# Patient Record
Sex: FEMALE | Race: WHITE | Employment: OTHER | URBAN - METROPOLITAN AREA
[De-identification: names, ages, dates, MRNs, and addresses within clinical notes are randomized per-mention and may not be internally consistent; named-entity substitution may affect disease eponyms.]

---

## 2017-01-09 RX ORDER — ZOLEDRONIC ACID 5 MG/100ML
5 INJECTION, SOLUTION INTRAVENOUS ONCE
Status: ACTIVE | OUTPATIENT
Start: 2017-01-11 | End: 2017-01-12

## 2017-01-13 ENCOUNTER — HOSPITAL ENCOUNTER (OUTPATIENT)
Dept: INFUSION THERAPY | Age: 62
Discharge: HOME | End: 2017-01-13
Payer: COMMERCIAL

## 2017-01-13 VITALS
DIASTOLIC BLOOD PRESSURE: 72 MMHG | OXYGEN SATURATION: 99 % | TEMPERATURE: 96.5 F | HEART RATE: 71 BPM | RESPIRATION RATE: 18 BRPM | SYSTOLIC BLOOD PRESSURE: 109 MMHG

## 2017-01-13 LAB
ANION GAP BLD CALC-SCNC: 19 MMOL/L (ref 5–15)
BUN BLD-MCNC: 29 MG/DL (ref 9–20)
CA-I BLD-MCNC: 1.28 MMOL/L (ref 1.12–1.32)
CHLORIDE BLD-SCNC: 99 MMOL/L (ref 98–107)
CO2 BLD-SCNC: 25 MMOL/L (ref 21–32)
CREAT BLD-MCNC: 1.2 MG/DL (ref 0.6–1.3)
CREAT SERPL-MCNC: 1.14 MG/DL (ref 0.55–1.02)
GLUCOSE BLD-MCNC: 82 MG/DL (ref 65–105)
HCT VFR BLD CALC: 36 % (ref 35–47)
HGB BLD-MCNC: 12.2 GM/DL (ref 11.5–16)
POTASSIUM BLD-SCNC: 4.7 MMOL/L (ref 3.5–5.1)
SERVICE CMNT-IMP: ABNORMAL
SODIUM BLD-SCNC: 136 MMOL/L (ref 136–145)

## 2017-01-13 PROCEDURE — 36415 COLL VENOUS BLD VENIPUNCTURE: CPT | Performed by: FAMILY MEDICINE

## 2017-01-13 PROCEDURE — 80047 BASIC METABLC PNL IONIZED CA: CPT

## 2017-01-13 PROCEDURE — 82565 ASSAY OF CREATININE: CPT | Performed by: FAMILY MEDICINE

## 2017-01-13 PROCEDURE — 96365 THER/PROPH/DIAG IV INF INIT: CPT

## 2017-01-13 PROCEDURE — 74011250636 HC RX REV CODE- 250/636: Performed by: FAMILY MEDICINE

## 2017-01-13 RX ORDER — ZOLEDRONIC ACID 5 MG/100ML
5 INJECTION, SOLUTION INTRAVENOUS ONCE
Status: COMPLETED | OUTPATIENT
Start: 2017-01-13 | End: 2017-01-13

## 2017-01-13 RX ADMIN — ZOLEDRONIC ACID 5 MG: 5 INJECTION, SOLUTION INTRAVENOUS at 14:16

## 2017-01-13 NOTE — PROGRESS NOTES
1315 Pt arrived ambulatory and in no apparent distress for Reclast infusion and labs. PIV obtained, labs drawn without difficultly. 1440 Pt tolerated treatment well, Reclast ok to give based on labs. Vitals stable throughout, discharged ambulatory and without complaints. Pt has not scheduled next appointment, will contact doctor for further instruction. Meds:  Reclast 5 mg IVPB  NS TKO    Patient Vitals for the past 12 hrs:   Temp Pulse Resp BP SpO2   01/13/17 1310 96.5 °F (35.8 °C) 71 18 109/72 99 %     See [facility] for lab results.

## 2018-04-11 RX ORDER — SODIUM CHLORIDE 9 MG/ML
25 INJECTION, SOLUTION INTRAVENOUS CONTINUOUS
Status: DISPENSED | OUTPATIENT
Start: 2018-04-16 | End: 2018-04-17

## 2018-04-11 RX ORDER — ACETAMINOPHEN 325 MG/1
650 TABLET ORAL ONCE
Status: COMPLETED | OUTPATIENT
Start: 2018-04-16 | End: 2018-04-16

## 2018-04-11 RX ORDER — ZOLEDRONIC ACID 5 MG/100ML
5 INJECTION, SOLUTION INTRAVENOUS ONCE
Status: COMPLETED | OUTPATIENT
Start: 2018-04-16 | End: 2018-04-16

## 2018-04-16 ENCOUNTER — HOSPITAL ENCOUNTER (OUTPATIENT)
Dept: INFUSION THERAPY | Age: 63
Discharge: HOME | End: 2018-04-16
Payer: COMMERCIAL

## 2018-04-16 VITALS
RESPIRATION RATE: 18 BRPM | BODY MASS INDEX: 28.73 KG/M2 | WEIGHT: 178 LBS | DIASTOLIC BLOOD PRESSURE: 66 MMHG | HEART RATE: 79 BPM | SYSTOLIC BLOOD PRESSURE: 98 MMHG | TEMPERATURE: 97.6 F

## 2018-04-16 LAB
ALBUMIN SERPL-MCNC: 3.7 G/DL (ref 3.5–5)
ANION GAP SERPL CALC-SCNC: 6 MMOL/L (ref 5–15)
BUN SERPL-MCNC: 29 MG/DL (ref 6–20)
BUN/CREAT SERPL: 21 (ref 12–20)
CALCIUM SERPL-MCNC: 8.7 MG/DL (ref 8.5–10.1)
CHLORIDE SERPL-SCNC: 106 MMOL/L (ref 97–108)
CO2 SERPL-SCNC: 26 MMOL/L (ref 21–32)
CREAT SERPL-MCNC: 1.38 MG/DL (ref 0.55–1.02)
GLUCOSE SERPL-MCNC: 102 MG/DL (ref 65–100)
PHOSPHATE SERPL-MCNC: 3.6 MG/DL (ref 2.6–4.7)
POTASSIUM SERPL-SCNC: 5 MMOL/L (ref 3.5–5.1)
SODIUM SERPL-SCNC: 138 MMOL/L (ref 136–145)

## 2018-04-16 PROCEDURE — 80069 RENAL FUNCTION PANEL: CPT

## 2018-04-16 PROCEDURE — 96374 THER/PROPH/DIAG INJ IV PUSH: CPT

## 2018-04-16 PROCEDURE — 36415 COLL VENOUS BLD VENIPUNCTURE: CPT

## 2018-04-16 PROCEDURE — 74011250636 HC RX REV CODE- 250/636

## 2018-04-16 PROCEDURE — 74011250637 HC RX REV CODE- 250/637

## 2018-04-16 RX ADMIN — SODIUM CHLORIDE 25 ML/HR: 900 INJECTION, SOLUTION INTRAVENOUS at 15:28

## 2018-04-16 RX ADMIN — ACETAMINOPHEN 650 MG: 325 TABLET ORAL at 15:27

## 2018-04-16 RX ADMIN — ZOLEDRONIC ACID 5 MG: 5 INJECTION, SOLUTION INTRAVENOUS at 15:28

## 2018-04-16 NOTE — PROGRESS NOTES
[facility]fusion [facility] Short Visit Progress Note    5305 Pt admit to [facility] for Reclamp ambulatory in stable condition. Assessment completed. No new concerns voiced. PIV established in the right arm and labs drawn and sent for processing. Visit Vitals    BP 98/66 (BP 1 Location: Left arm, BP Patient Position: At rest)    Pulse 79    Temp 97.6 °F (36.4 °C)    Resp 18    Wt 80.7 kg (178 lb)    BMI 28.73 kg/m2          Medications:  Tylenol 650mg PO  Reclast IVPB over 15 minutes     1545 Pt tolerated treatment well. D/c home ambulatory in no distress. Pt has no further appt at this time.     Recent Results (from the past 12 hour(s))   RENAL FUNCTION PANEL    Collection Time: 04/16/18  2:22 PM   Result Value Ref Range    Sodium 138 136 - 145 mmol/L    Potassium 5.0 3.5 - 5.1 mmol/L    Chloride 106 97 - 108 mmol/L    CO2 26 21 - 32 mmol/L    Anion gap 6 5 - 15 mmol/L    Glucose 102 (H) 65 - 100 mg/dL    BUN 29 (H) 6 - 20 MG/DL    Creatinine 1.38 (H) 0.55 - 1.02 MG/DL    BUN/Creatinine ratio 21 (H) 12 - 20      GFR est AA 47 (L) >60 ml/min/1.73m2    GFR est non-AA 39 (L) >60 ml/min/1.73m2    Calcium 8.7 8.5 - 10.1 MG/DL    Phosphorus 3.6 2.6 - 4.7 MG/DL    Albumin 3.7 3.5 - 5.0 g/dL

## 2019-04-12 RX ORDER — SODIUM CHLORIDE 9 MG/ML
25 INJECTION, SOLUTION INTRAVENOUS CONTINUOUS
Status: DISCONTINUED | OUTPATIENT
Start: 2019-04-15 | End: 2019-04-16 | Stop reason: HOSPADM

## 2019-04-12 RX ORDER — ZOLEDRONIC ACID 5 MG/100ML
5 INJECTION, SOLUTION INTRAVENOUS ONCE
Status: DISCONTINUED | OUTPATIENT
Start: 2019-04-15 | End: 2019-04-16

## 2019-04-15 ENCOUNTER — HOSPITAL ENCOUNTER (OUTPATIENT)
Dept: INFUSION THERAPY | Age: 64
Discharge: HOME | End: 2019-04-15

## 2020-04-20 ENCOUNTER — HOSPITAL ENCOUNTER (OUTPATIENT)
Dept: INFUSION THERAPY | Age: 65
End: 2020-04-20

## 2022-01-13 ENCOUNTER — OFFICE VISIT (OUTPATIENT)
Dept: ORTHOPEDIC SURGERY | Age: 67
End: 2022-01-13
Payer: MEDICARE

## 2022-01-13 VITALS — WEIGHT: 183 LBS | BODY MASS INDEX: 29.41 KG/M2 | HEIGHT: 66 IN

## 2022-01-13 DIAGNOSIS — M54.2 NECK PAIN: Primary | ICD-10-CM

## 2022-01-13 DIAGNOSIS — M48.02 CERVICAL STENOSIS OF SPINAL CANAL: ICD-10-CM

## 2022-01-13 DIAGNOSIS — M50.90 CERVICAL DISC DISEASE: ICD-10-CM

## 2022-01-13 PROCEDURE — 1090F PRES/ABSN URINE INCON ASSESS: CPT | Performed by: ORTHOPAEDIC SURGERY

## 2022-01-13 PROCEDURE — 99204 OFFICE O/P NEW MOD 45 MIN: CPT | Performed by: ORTHOPAEDIC SURGERY

## 2022-01-13 PROCEDURE — G8427 DOCREV CUR MEDS BY ELIG CLIN: HCPCS | Performed by: ORTHOPAEDIC SURGERY

## 2022-01-13 PROCEDURE — G8432 DEP SCR NOT DOC, RNG: HCPCS | Performed by: ORTHOPAEDIC SURGERY

## 2022-01-13 PROCEDURE — G8400 PT W/DXA NO RESULTS DOC: HCPCS | Performed by: ORTHOPAEDIC SURGERY

## 2022-01-13 PROCEDURE — 1101F PT FALLS ASSESS-DOCD LE1/YR: CPT | Performed by: ORTHOPAEDIC SURGERY

## 2022-01-13 PROCEDURE — 3017F COLORECTAL CA SCREEN DOC REV: CPT | Performed by: ORTHOPAEDIC SURGERY

## 2022-01-13 PROCEDURE — G8419 CALC BMI OUT NRM PARAM NOF/U: HCPCS | Performed by: ORTHOPAEDIC SURGERY

## 2022-01-13 PROCEDURE — G8536 NO DOC ELDER MAL SCRN: HCPCS | Performed by: ORTHOPAEDIC SURGERY

## 2022-01-13 RX ORDER — LORAZEPAM 0.5 MG/1
0.5 TABLET ORAL
COMMUNITY
Start: 2021-11-18

## 2022-01-13 RX ORDER — DILTIAZEM HYDROCHLORIDE 240 MG/1
1 CAPSULE, COATED, EXTENDED RELEASE ORAL
COMMUNITY

## 2022-01-13 RX ORDER — MELOXICAM 15 MG/1
15 TABLET ORAL
COMMUNITY
End: 2022-02-16

## 2022-01-13 RX ORDER — BUDESONIDE AND FORMOTEROL FUMARATE DIHYDRATE 160; 4.5 UG/1; UG/1
2 AEROSOL RESPIRATORY (INHALATION) AS NEEDED
COMMUNITY
End: 2022-08-22

## 2022-01-13 RX ORDER — TRAMADOL HYDROCHLORIDE 50 MG/1
50 TABLET ORAL
Qty: 50 TABLET | Refills: 0 | Status: SHIPPED | OUTPATIENT
Start: 2022-01-13 | End: 2022-01-16

## 2022-01-13 RX ORDER — GABAPENTIN 300 MG/1
300 CAPSULE ORAL
Qty: 30 CAPSULE | Refills: 0 | Status: SHIPPED
Start: 2022-01-13 | End: 2022-08-22

## 2022-01-13 RX ORDER — ROSUVASTATIN CALCIUM 10 MG/1
1 TABLET, COATED ORAL EVERY EVENING
COMMUNITY
Start: 2021-07-14

## 2022-01-13 NOTE — PROGRESS NOTES
Audra Youngblood (: 1955) is a 77 y.o. female, patient, here for evaluation of the following chief complaint(s):  Neck Pain (Cervical MRII @[facility] 2021)       ASSESSMENT/PLAN:    Below is the assessment and plan developed based on review of pertinent history, physical exam, labs, studies, and medications. At this point she has severe neck pain and bilateral arm pain with both sensory and motor deficits. This is gotten progressively worse over the last several months. She has severe spondylosis at C5-6 and C6-7 with severe foraminal narrowing bilaterally mild central stenosis is noted at those levels as well. She is an excellent candidate for an ACDF at C5-6 and C6-7. Risk and benefits were discussed with her. We will try some oral Neurontin and an anti-inflammatory as well as a round of epidurals first as well. If they fail to improve her symptoms she will proceed with the surgical intervention. The risks and benefits were discussed at length with the patient and the patient has elected to proceed. Indications for surgery include failed conservative treatment. Alternative treatments, risks and the perioperative course were discussed with the patient. All questions were answered. The risks and benefits of the procedure were explained. Benefits include definitive diagnosis, relief of pain, elimination of deformity and improved function. Risks of surgery including bleeding, infection, weakness, numbness, CSF leak, failure to improve symptoms, exacerbation of medical co-morbidities and even death were discussed with the patient. 1. Neck pain  -     XR SPINE CERV 4 OR 5 V; Future  2. Cervical disc disease  3. Cervical stenosis of spinal canal      No follow-ups on file. SUBJECTIVE/OBJECTIVE:  Audra Youngblood (: 1955) is a 77 y.o. female. No flowsheet data found. Comes in today for an evaluation for chronic neck and bilateral arm pain.  She has had worsening neck pain with radiation across both shoulders as well as numbness in both hands as well as some tingling in the upper extremities. She also has weakness in her triceps bilaterally. The pain is gotten severe. She tried physical therapy and that made the symptoms worse. Imaging:    I reviewed her MRI which shows she has severe spondylosis at C5-6 and C6-7 with disc base collapse and narrowing Modic changes of the endplates. Severe bilateral foraminal narrowing noted at both those levels. XR Results (most recent):  Results from Appointment encounter on 01/13/22    XR SPINE CERV 4 OR 5 V    Narrative  4 views of the cervical spine were reviewed today. She has moderate to severe spondylosis particular at C5-6 and C6-7. Fractures or lytic lesions. MRI Results (most recent):  No results found for this or any previous visit. Allergies   Allergen Reactions    Amlodipine Vertigo    Ace Inhibitors Cough    Codeine Itching       Current Outpatient Medications   Medication Sig    budesonide-formoteroL (Symbicort) 160-4.5 mcg/actuation HFAA TAKE 2 PUFFS BY MOUTH TWICE A DAY. RINSE MOUTH AFTER USE    tiotropium (Spiriva with HandiHaler) 18 mcg inhalation capsule Take 1 Capsule by mouth daily.  rosuvastatin (CRESTOR) 10 mg tablet Take 1 Tablet by mouth daily.  LORazepam (ATIVAN) 0.5 mg tablet 1 Tablet as needed    meloxicam (MOBIC) 15 mg tablet 1 Tablet daily.  dilTIAZem ER (CARDIZEM CD) 240 mg capsule Take 1 Capsule by mouth daily.  ranitidine (ZANTAC) 150 mg tablet Take 150 mg by mouth nightly.  zolpidem (AMBIEN) 10 mg tablet Take  by mouth nightly as needed for Sleep.  zoledronic acid (RECLAST) 5 mg/100 mL Soln 5 mg by IntraVENous route once. ONCE A YEAR, DUE IN DECEMBER    simvastatin (ZOCOR) 10 mg tablet Take 10 mg by mouth nightly.  multivitamins-minerals-lutein (CENTRUM SILVER) Tab Take  by mouth.       Calcium-Cholecalciferol, D3, (CALCIUM 600 WITH VITAMIN D3) 600 mg(1,500mg) -400 unit cap Take  by mouth two (2) times a day.  sucralfate (CARAFATE) 100 mg/mL suspension Take 2 tsp by mouth three (3) times daily. As n eeded     No current facility-administered medications for this visit. Past Medical History:   Diagnosis Date    GERD (gastroesophageal reflux disease)     Hypertension     no longer on meds        Past Surgical History:   Procedure Laterality Date    HX ORTHOPAEDIC      LOWER BACK    HX ORTHOPAEDIC      RIGHT WRIST       Family History   Problem Relation Age of Onset    Stroke Mother     Other Mother     Heart Disease Father     Hypertension Father     Elevated Lipids Father     Heart Disease Maternal Grandmother     Heart Disease Maternal Grandfather     Cancer Paternal Grandmother         LEUKEMIA        Social History     Tobacco Use    Smoking status: Former Smoker     Packs/day: 1.50     Years: 30.00     Pack years: 45.00     Quit date: 2012     Years since quittin.0    Smokeless tobacco: Never Used   Substance Use Topics    Alcohol use: Yes     Comment: OCC    Drug use: Not on file        Review of Systems   Musculoskeletal: Positive for neck pain and neck stiffness. Neurological: Positive for weakness. Vitals:  Ht 5' 6\" (1.676 m)   Wt 183 lb (83 kg)   BMI 29.54 kg/m²    Body mass index is 29.54 kg/m². Ortho Exam       Alert and [place]  x 3    Normal gait and station; normal posture    No assistive devices today. Lumbar spine:  Examination of the lumbar spine demonstrates no tenderness on palpation, no pain, no swelling or edema, with normal lumbar range of motion. Thoracic spine: Examination of the thoracic spine demonstrates no tenderness on palpation, no pain, no swelling or edema. Normal sensation and range of motion. Cervical spine:     Examination of the cervical spine demonstrates on inspection: No shoulder asymmetry. No abnormal cutaneous markings. No masses.     On palpation: Tenderness along both superior shoulders and posterior scapular region    Range of motion: Limited flexion to 35 degrees. Increased pain with extension. Motor examination:  Right deltoid 5/5,  left deltoid 5/5, right bicep 5/5, left bicep 5/5, right wrist extensor 5/5/, left wrist extensor 5/5, right triceps 4/5, left triceps 4/5, right intrinsics 5/5, left intrinsics    Sensory examination: Reveals decree sensation along the C6 and C7 dermatomes    Reflexes: Left bicep 2/2, left bicep 2/2, right triceps 2/2, left triceps 2/2    Functional testing: Spurling's exam is positive bilaterally; upper limb tension test is positive bilaterally    Stewart's signs negative bilaterally. An electronic signature was used to authenticate this note.   -- Kristine Menard MD

## 2022-01-13 NOTE — PATIENT INSTRUCTIONS
Cervical Spinal Fusion: Before Your Surgery  What is cervical spinal fusion? Cervical spinal fusion is surgery that joins two or more of the vertebrae in your neck. When these bones are joined together, it's called fusion. After the joints are fused, they can no longer move. During the surgery, the doctor uses bone to make a \"bridge\" between your vertebrae. This bridge may be strengthened with metal plates and screws. In most cases, the doctor uses bone from another part of your body or bone that has been donated to a bone bank. But sometimes artificial bone is used. To do the surgery, the doctor makes a cut in either the front or the back of your neck. The cut is called an incision. It leaves a scar that fades with time. After surgery, you will stay in the hospital for a few days. Your neck will feel stiff or sore. You will get medicine to help with pain. Most people can go back to work after 4 to 6 weeks. But it may take a few months to get back to your usual activities. Follow-up care is a key part of your treatment and safety. Be sure to make and go to all appointments, and call your doctor if you are having problems. It's also a good idea to know your test results and keep a list of the medicines you take. How do you prepare for surgery? Surgery can be stressful. This information will help you understand what you can expect. And it will help you safely prepare for surgery. Preparing for surgery    · Be sure you have someone to take you home. Anesthesia and pain medicine will make it unsafe for you to drive or get home on your own.     · Understand exactly what surgery is planned, along with the risks, benefits, and other options.     · If you take aspirin or some other blood thinner, ask your doctor if you should stop taking it before your surgery. Make sure that you understand exactly what your doctor wants you to do.  These medicines increase the risk of bleeding.     · Tell your doctor ALL the medicines, vitamins, supplements, and herbal remedies you take. Some may increase the risk of problems during your surgery. Your doctor will tell you if you should stop taking any of them before the surgery and how soon to do it.     · Make sure your doctor and the hospital have a copy of your advance directive. If you don't have one, you may want to prepare one. It lets others know your health care wishes. It's a good thing to have before any type of surgery or procedure. What happens on the day of surgery? · Follow the instructions exactly about when to stop eating and drinking. If you don't, your surgery may be canceled. If your doctor told you to take your medicines on the day of surgery, take them with only a sip of water.     · Take a bath or shower before you come in for your surgery. Do not apply lotions, perfumes, deodorants, or nail polish.     · Do not shave the surgical site yourself.     · Take off all jewelry and piercings. And take out contact lenses, if you wear them. At the hospital or surgery center   · Bring a picture ID.     · The area for surgery is often marked to make sure there are no errors.     · You will be kept comfortable and safe by your anesthesia provider. You will be asleep during the surgery.     · The surgery usually takes 2 to 4 hours. If more than two vertebrae are being fused together, it will take longer.     · When you wake up, you will be lying on your back. You will have a soft or hard collar around your neck. This will protect and support your neck. It will also keep you from turning your head.     · You may have a small plastic tube coming out of your incision. This is to drain fluids. It's usually taken out in 1 or 2 days. When should you call your doctor?    · You have questions or concerns.     · You do not understand how to prepare for your surgery.     · You become ill before surgery (such as fever, flu, or a cold).     · You need to reschedule or have changed your mind about having the surgery. Where can you learn more? Go to http://www.gray.com/  Enter N543 in the search box to learn more about \"Cervical Spinal Fusion: Before Your Surgery. \"  Current as of: July 1, 2021               Content Version: 13.0  © 2488-5052 [employer], [employer]. Care instructions adapted under license by [employer] (which disclaims liability or warranty for this information). If you have questions about a medical condition or this instruction, always ask your healthcare professional. [address] any warranty or liability for your use of this information.

## 2022-01-17 DIAGNOSIS — M50.90 CERVICAL DISC DISEASE: Primary | ICD-10-CM

## 2022-01-17 DIAGNOSIS — M54.2 NECK PAIN: ICD-10-CM

## 2022-01-17 DIAGNOSIS — M48.02 CERVICAL STENOSIS OF SPINAL CANAL: ICD-10-CM

## 2022-01-28 ENCOUNTER — TRANSCRIBE ORDER (OUTPATIENT)
Dept: REGISTRATION | Age: 67
End: 2022-01-28

## 2022-01-28 DIAGNOSIS — Z01.812 PRE-PROCEDURE LAB EXAM: Primary | ICD-10-CM

## 2022-02-01 NOTE — H&P
Mary Lucas (: 1955) is a 77 y.o. female, patient, here for evaluation of the following chief complaint(s):  Neck Pain (Cervical MRII @[facility] 2021)        ASSESSMENT/PLAN:     Below is the assessment and plan developed based on review of pertinent history, physical exam, labs, studies, and medications.     At this point she has severe neck pain and bilateral arm pain with both sensory and motor deficits. This is gotten progressively worse over the last several months. She has severe spondylosis at C5-6 and C6-7 with severe foraminal narrowing bilaterally mild central stenosis is noted at those levels as well. She is an excellent candidate for an ACDF at C5-6 and C6-7. Risk and benefits were discussed with her. We will try some oral Neurontin and an anti-inflammatory as well as a round of epidurals first as well. If they fail to improve her symptoms she will proceed with the surgical intervention.     The risks and benefits were discussed at length with the patient and the patient has elected to proceed. Indications for surgery include failed conservative treatment. Alternative treatments, risks and the perioperative course were discussed with the patient. All questions were answered. The risks and benefits of the procedure were explained. Benefits include definitive diagnosis, relief of pain, elimination of deformity and improved function. Risks of surgery including bleeding, infection, weakness, numbness, CSF leak, failure to improve symptoms, exacerbation of medical co-morbidities and even death were discussed with the patient.            1. Neck pain  -     XR SPINE CERV 4 OR 5 V; Future  2. Cervical disc disease  3. Cervical stenosis of spinal canal        No follow-ups on file.        SUBJECTIVE/OBJECTIVE:  Mary Lucas (: 1955) is a 77 y.o. female.     No flowsheet data found. Comes in today for an evaluation for chronic neck and bilateral arm pain.  She has had worsening neck pain with radiation across both shoulders as well as numbness in both hands as well as some tingling in the upper extremities. She also has weakness in her triceps bilaterally. The pain is gotten severe. She tried physical therapy and that made the symptoms worse.     Imaging:     I reviewed her MRI which shows she has severe spondylosis at C5-6 and C6-7 with disc base collapse and narrowing Modic changes of the endplates. Severe bilateral foraminal narrowing noted at both those levels.        XR Results (most recent):  Results from Appointment encounter on 01/13/22     XR SPINE CERV 4 OR 5 V     Narrative  4 views of the cervical spine were reviewed today. She has moderate to severe spondylosis particular at C5-6 and C6-7. Fractures or lytic lesions.        MRI Results (most recent):  No results found for this or any previous visit.                Allergies   Allergen Reactions    Amlodipine Vertigo    Ace Inhibitors Cough    Codeine Itching              Current Outpatient Medications   Medication Sig    budesonide-formoteroL (Symbicort) 160-4.5 mcg/actuation HFAA TAKE 2 PUFFS BY MOUTH TWICE A DAY. RINSE MOUTH AFTER USE    tiotropium (Spiriva with HandiHaler) 18 mcg inhalation capsule Take 1 Capsule by mouth daily.  rosuvastatin (CRESTOR) 10 mg tablet Take 1 Tablet by mouth daily.  LORazepam (ATIVAN) 0.5 mg tablet 1 Tablet as needed    meloxicam (MOBIC) 15 mg tablet 1 Tablet daily.  dilTIAZem ER (CARDIZEM CD) 240 mg capsule Take 1 Capsule by mouth daily.  ranitidine (ZANTAC) 150 mg tablet Take 150 mg by mouth nightly.  zolpidem (AMBIEN) 10 mg tablet Take  by mouth nightly as needed for Sleep.  zoledronic acid (RECLAST) 5 mg/100 mL Soln 5 mg by IntraVENous route once. ONCE A YEAR, DUE IN DECEMBER    simvastatin (ZOCOR) 10 mg tablet Take 10 mg by mouth nightly.  multivitamins-minerals-lutein (CENTRUM SILVER) Tab Take  by mouth.       Calcium-Cholecalciferol, D3, (CALCIUM 600 WITH VITAMIN D3) 600 mg(1,500mg) -400 unit cap Take  by mouth two (2) times a day.  sucralfate (CARAFATE) 100 mg/mL suspension Take 2 tsp by mouth three (3) times daily. As n eeded      No current facility-administered medications for this visit.              Past Medical History:   Diagnosis Date    GERD (gastroesophageal reflux disease)      Hypertension       no longer on meds               Past Surgical History:   Procedure Laterality Date    HX ORTHOPAEDIC         LOWER BACK    HX ORTHOPAEDIC         RIGHT WRIST               Family History   Problem Relation Age of Onset    Stroke Mother      Other Mother      Heart Disease Father      Hypertension Father      Elevated Lipids Father      Heart Disease Maternal Grandmother      Heart Disease Maternal Grandfather      Cancer Paternal Grandmother           LEUKEMIA         Social History            Tobacco Use    Smoking status: Former Smoker       Packs/day: 1.50       Years: 30.00       Pack years: 45.00       Quit date: 2012       Years since quittin.0    Smokeless tobacco: Never Used   Substance Use Topics    Alcohol use: Yes       Comment: OCC    Drug use: Not on file         Review of Systems   Musculoskeletal: Positive for neck pain and neck stiffness. Neurological: Positive for weakness.            Vitals:  Ht 5' 6\" (1.676 m)   Wt 183 lb (83 kg)   BMI 29.54 kg/m²    Body mass index is 29.54 kg/m².     Ortho Exam         Alert and [place]  x 3     Normal gait and station; normal posture     No assistive devices today.     Lumbar spine:  Examination of the lumbar spine demonstrates no tenderness on palpation, no pain, no swelling or edema, with normal lumbar range of motion.     Thoracic spine: Examination of the thoracic spine demonstrates no tenderness on palpation, no pain, no swelling or edema. Normal sensation and range of motion.      Cervical spine:      Examination of the cervical spine demonstrates on inspection: No shoulder asymmetry. No abnormal cutaneous markings. No masses.     On palpation: Tenderness along both superior shoulders and posterior scapular region     Range of motion: Limited flexion to 35 degrees. Increased pain with extension.     Motor examination:  Right deltoid 5/5,  left deltoid 5/5, right bicep 5/5, left bicep 5/5, right wrist extensor 5/5/, left wrist extensor 5/5, right triceps 4/5, left triceps 4/5, right intrinsics 5/5, left intrinsics     Sensory examination: Reveals decree sensation along the C6 and C7 dermatomes     Reflexes: Left bicep 2/2, left bicep 2/2, right triceps 2/2, left triceps 2/2     Functional testing: Spurling's exam is positive bilaterally; upper limb tension test is positive bilaterally     Stewart's signs negative bilaterally.          Date of Surgery Update:  Rubi Singh was seen and examined. History and physical has been reviewed. The patient has been examined. There have been no significant clinical changes since the completion of the originally dated History and Physical.    Signed By: Liam Miller MD     February 11, 2022 7:13 AM              An electronic signature was used to authenticate this note.   -- Liam Miller MD

## 2022-02-04 ENCOUNTER — HOSPITAL ENCOUNTER (OUTPATIENT)
Dept: PREADMISSION TESTING | Age: 67
Discharge: HOME | End: 2022-02-04
Payer: MEDICARE

## 2022-02-04 VITALS
RESPIRATION RATE: 12 BRPM | WEIGHT: 190.26 LBS | TEMPERATURE: 98 F | SYSTOLIC BLOOD PRESSURE: 123 MMHG | DIASTOLIC BLOOD PRESSURE: 76 MMHG | HEART RATE: 84 BPM | HEIGHT: 66 IN | BODY MASS INDEX: 30.58 KG/M2

## 2022-02-04 LAB
ABO + RH BLD: NORMAL
ANION GAP SERPL CALC-SCNC: 5 MMOL/L (ref 5–15)
APPEARANCE UR: CLEAR
BACTERIA URNS QL MICRO: NEGATIVE /HPF
BILIRUB UR QL: NEGATIVE
BLOOD GROUP ANTIBODIES SERPL: NORMAL
BUN SERPL-MCNC: 26 MG/DL (ref 6–20)
BUN/CREAT SERPL: 18 (ref 12–20)
CALCIUM SERPL-MCNC: 9.3 MG/DL (ref 8.5–10.1)
CHLORIDE SERPL-SCNC: 106 MMOL/L (ref 97–108)
CO2 SERPL-SCNC: 27 MMOL/L (ref 21–32)
COLOR UR: ABNORMAL
CREAT SERPL-MCNC: 1.46 MG/DL (ref 0.55–1.02)
EPITH CASTS URNS QL MICRO: ABNORMAL /LPF
ERYTHROCYTE [DISTWIDTH] IN BLOOD BY AUTOMATED COUNT: 14 % (ref 11.5–14.5)
EST. AVERAGE GLUCOSE BLD GHB EST-MCNC: 123 MG/DL
GLUCOSE SERPL-MCNC: 94 MG/DL (ref 65–100)
GLUCOSE UR STRIP.AUTO-MCNC: NEGATIVE MG/DL
HBA1C MFR BLD: 5.9 % (ref 4–5.6)
HCT VFR BLD AUTO: 33.3 % (ref 35–47)
HGB BLD-MCNC: 10.4 G/DL (ref 11.5–16)
HGB UR QL STRIP: NEGATIVE
HYALINE CASTS URNS QL MICRO: ABNORMAL /LPF (ref 0–5)
INR PPP: 1 (ref 0.9–1.1)
KETONES UR QL STRIP.AUTO: NEGATIVE MG/DL
LEUKOCYTE ESTERASE UR QL STRIP.AUTO: ABNORMAL
MCH RBC QN AUTO: 29.2 PG (ref 26–34)
MCHC RBC AUTO-ENTMCNC: 31.2 G/DL (ref 30–36.5)
MCV RBC AUTO: 93.5 FL (ref 80–99)
NITRITE UR QL STRIP.AUTO: NEGATIVE
NRBC # BLD: 0 K/UL (ref 0–0.01)
NRBC BLD-RTO: 0 PER 100 WBC
PH UR STRIP: 7.5 [PH] (ref 5–8)
PLATELET # BLD AUTO: 256 K/UL (ref 150–400)
PMV BLD AUTO: 11.9 FL (ref 8.9–12.9)
POTASSIUM SERPL-SCNC: 4.5 MMOL/L (ref 3.5–5.1)
PROT UR STRIP-MCNC: NEGATIVE MG/DL
PROTHROMBIN TIME: 10.3 SEC (ref 9–11.1)
RBC # BLD AUTO: 3.56 M/UL (ref 3.8–5.2)
RBC #/AREA URNS HPF: ABNORMAL /HPF (ref 0–5)
SODIUM SERPL-SCNC: 138 MMOL/L (ref 136–145)
SP GR UR REFRACTOMETRY: 1.02 (ref 1–1.03)
SPECIMEN EXP DATE BLD: NORMAL
UA: UC IF INDICATED,UAUC: ABNORMAL
UROBILINOGEN UR QL STRIP.AUTO: 1 EU/DL (ref 0.2–1)
WBC # BLD AUTO: 8.6 K/UL (ref 3.6–11)
WBC URNS QL MICRO: ABNORMAL /HPF (ref 0–4)

## 2022-02-04 PROCEDURE — 36415 COLL VENOUS BLD VENIPUNCTURE: CPT

## 2022-02-04 PROCEDURE — 93005 ELECTROCARDIOGRAM TRACING: CPT

## 2022-02-04 PROCEDURE — 81001 URINALYSIS AUTO W/SCOPE: CPT

## 2022-02-04 PROCEDURE — 86900 BLOOD TYPING SEROLOGIC ABO: CPT

## 2022-02-04 PROCEDURE — 83036 HEMOGLOBIN GLYCOSYLATED A1C: CPT

## 2022-02-04 PROCEDURE — 85610 PROTHROMBIN TIME: CPT

## 2022-02-04 PROCEDURE — 85027 COMPLETE CBC AUTOMATED: CPT

## 2022-02-04 PROCEDURE — 80048 BASIC METABOLIC PNL TOTAL CA: CPT

## 2022-02-04 RX ORDER — FEXOFENADINE HCL AND PSEUDOEPHEDRINE HCI 60; 120 MG/1; MG/1
1 TABLET, EXTENDED RELEASE ORAL DAILY
COMMUNITY
End: 2022-08-22

## 2022-02-04 RX ORDER — IRBESARTAN 300 MG/1
300 TABLET ORAL DAILY
COMMUNITY
End: 2022-08-22

## 2022-02-04 RX ORDER — OMEPRAZOLE 20 MG/1
20 CAPSULE, DELAYED RELEASE ORAL
COMMUNITY

## 2022-02-04 RX ORDER — MONTELUKAST SODIUM 10 MG/1
10 TABLET ORAL DAILY
COMMUNITY
End: 2022-08-22

## 2022-02-04 NOTE — PERIOP NOTES
CALLED DR. DANNIELLE PATRICIA'S OFFICE FOR CARDIOLOGY NOTES AND EKG; SPOKE WITH Aram Martinez. LAST OFFICE VISIT ON 8/6/21. EKG RECEIVED AND PLACED ON CHART. STILL WAITING FOR OFFICE NOTES. 1615:  Received notes from Dr. Pearson Headings office; placed on chart.

## 2022-02-04 NOTE — PERIOP NOTES
[facility] INSTRUCTIONS  ORTHOPAEDIC    Surgery Date:   2/11/22    [facility] staff will call you between 4 PM- 8 PM the day before surgery with your arrival time. If your surgery is on a Monday, we will call you the preceding Friday. Please call [phone] after 8 PM if you did not receive your arrival time. 1. Please report to [facility] Patient Access/Admitting on the 1st floor. Bring your insurance card, photo identification, and any copayment (if applicable). 2. If you are going home the same day of your surgery, you must have a responsible adult to drive you home. You need to have a responsible adult to stay with you the first 24 hours after surgery and you should not drive a car for 24 hours following your surgery. 3. Do NOT eat any solid foods after midnight the night before surgery including candy, mints or gum. You may drink clear liquids from midnight until 1 hour prior to arrival time. You may drink up to 12 ounces at one time every 4 hours. 4. Do NOT drink alcohol or smoke 24 hours before surgery. STOP smoking for 14 days prior as it helps with breathing and healing after surgery. 5. If your arrival time is 3pm or later, you may eat a light breakfast before 8am (toast, bagel-no butter, black coffee, plain tea, fruit juice-no pulp) Please note special instructions, if applicable, below for medications. 6. If you are being admitted to the hospital,please leave personal belongings/luggage in your car until you have an assigned hospital room number. 7. Please wear comfortable clothes. Wear your glasses instead of contacts. We ask that all money, jewelry and valuables be left at home. Wear no make up, particularly mascara, the day of surgery. 8.  All body piercings, rings, and jewelry need to be removed and left at home. Please remove any nail polish or artificial nails from your fingernails. Please wear your hair loose or down.  Please no pony-tails, buns, or any metal hair accessories. If you shower the morning of surgery, please do not apply any lotions or powders afterwards. You may wear deodorant. Do not shave any body area within 24 hours of your surgery. 9. Please follow all instructions to avoid any potential surgical cancellation. 10. Should your physical condition change, (i.e. fever, cold, flu, etc.) please notify your surgeon as soon as possible. 11. It is important to be on time. If a situation occurs where you may be delayed, please call:  [phone] / [phone] on the day of surgery. 12. The Preadmission Testing staff can be reached at [phone]. 13. Special instructions: NONE    Current Outpatient Medications   Medication Sig    omeprazole (PRILOSEC) 20 mg capsule Take 20 mg by mouth daily.  irbesartan (AVAPRO) 300 mg tablet Take 300 mg by mouth daily.  montelukast (SINGULAIR) 10 mg tablet Take 10 mg by mouth daily.  fexofenadine-pseudoephedrine (Allegra-D 12 Hour)  mg Tb12 Take 1 Tablet by mouth daily.  budesonide-formoteroL (Symbicort) 160-4.5 mcg/actuation HFAA 2 Puffs as needed.  tiotropium (Spiriva with HandiHaler) 18 mcg inhalation capsule Take 1 Capsule by mouth daily.  rosuvastatin (CRESTOR) 10 mg tablet Take 1 Tablet by mouth nightly.  LORazepam (ATIVAN) 0.5 mg tablet Take 0.5 mg by mouth two (2) times a day.  meloxicam (MOBIC) 15 mg tablet Take 15 mg by mouth nightly.  dilTIAZem ER (CARDIZEM CD) 240 mg capsule Take 1 Capsule by mouth nightly.  gabapentin (Neurontin) 300 mg capsule Take 1 Capsule by mouth nightly. Max Daily Amount: 300 mg.    zolpidem (AMBIEN) 10 mg tablet Take 10 mg by mouth nightly as needed for Sleep.  multivitamins-minerals-lutein (CENTRUM SILVER) Tab Take  by mouth.  Calcium-Cholecalciferol, D3, (CALCIUM 600 WITH VITAMIN D3) 600 mg(1,500mg) -400 unit cap Take 1 Capsule by mouth two (2) times a day. No current facility-administered medications for this encounter. 1. YOU MUST ONLY TAKE THESE MEDICATIONS THE MORNING OF SURGERY WITH A SIP OF WATER: ATIVAN, SPIRIVA, OMEPRAZOLE, MONTELUKAST, ALLEGRA  2. MEDICATIONS TO TAKE THE MORNING OF SURGERY ONLY IF NEEDED: SYMBICORT  3. HOLD these prescription medications BEFORE Surgery: MELOXICAM - STOP TAKING ON 2/4/22  4. Ask your surgeon/prescribing physician about when/if to STOP taking these medications: N/A  5. Stop any non-steroidal anti-inflammatory drugs (i.e. Ibuprofen, Naproxen, Advil, Aleve) 7 days before surgery. You may take Tylenol. STOP all vitamins and herbal supplements 1 week prior to  surgery. 6. If you are currently taking Plavix, Coumadin, or any other blood-thinning/anticoagulant medication contact your prescribing physician for instructions. Preventing Infections Before and After - Your Surgery    IMPORTANT INSTRUCTIONS    Please read and follow these instructions carefully. If you are unable to comply with the below instructions your procedure will be cancelled. You play an important role in your health and preparation for surgery. To reduce the germs on your skin you will need to shower with CHG soap (Chorhexidine gluconate 4%) two times before surgery. CHG soap (Hibiclens, Hex-A-Clens or store brand)   CHG soap will be provided at your Preadmission Testing (PAT) appointment.  If you do not have a PAT appointment before surgery, you may arrange to  CHG soap from our office or purchase CHG soap at a pharmacy, grocery or department store.  You need to purchase TWO 4 ounce bottles to use for your 2 showers. Steps to follow:  1. Wash your hair with your normal shampoo and your body with regular soap and rinse well to remove shampoo and soap from your skin. 2. Wet a clean washcloth and turn off the shower. 3. Put CHG soap on washcloth and apply to your entire body from the neck down. Do not use on your head, face or private parts(genitals).  Do not use CHG soap on open sores, wounds or areas of skin irritation. 4. Wash you body gently for 5 minutes. Do not wash your skin too hard. This soap does not create lather. Pay special attention to your underarms and from your belly button to your feet. 5. Turn the shower back on and rinse well to get CHG soap off your body. 6. Pat your skin dry with a clean, dry towel. Do not apply lotions or moisturizer. 7. Put on clean clothes and sleep on fresh bed sheets and do not allow pets to sleep with you. Shower with CHG soap 2 times before your surgery   The evening before your surgery   The morning of your surgery      Tips to help prevent infections after your surgery:  1. Protect your surgical wound from germs:  ? Hand washing is the most important thing you and your caregivers can do to prevent infections. ? Keep your bandage clean and dry! ? Do not touch your surgical wound. 2. Use clean, freshly washed towels and washcloths every time you shower; do not share bath linens with others. 3. Until your surgical wound is healed, wear clothing and sleep on bed linens each day that are clean and freshly washed. 4. Do not allow pets to sleep in your bed with you or touch your surgical wound. 5. Do not smoke - smoking delays wound healing. This may be a good time to stop smoking. 6. If you have diabetes, it is important for you to manage your blood sugar levels properly before your surgery as well as after your surgery. Poorly managed blood sugar levels slow down wound healing and prevent you from healing completely. Prevention of Infection  Testing for Staphylococcus aureus on your skin before surgery    Staphylococcus aureus (staph) is a common bacteria that is found on the body. It normally does not cause infection on healthy skin. Before surgery, you will be tested to see if you have staph by swabbing the inside of your nose. When you have an incision with surgery, the goal is to protect that incision from infection.  Removal of the staph bacteria before surgery can decrease the risk of a surgical site infection. If your nose swab is positive for staph you will be called. Your treatment will include 2 steps:   Prescription for Mupirocin ointment to be used in each nostril twice a day for 5 days.  Showering with Chlorhexidine (CHG) liquid soap for 5 days prior to surgery. How to use Mupirocin ointment in your nose  1.  the prescription from your pharmacy. You will receive a large tube of ointment which will be big enough for all of your treatments. You will apply this ointment to each nostril 2 times a day for 5 days. 2. Wash your hands with  gel or soap and water for 20 seconds before using ointment. 3. Place a pea-sized amount of ointment on a cotton Q-tip. 4. Apply ointment just inside of each nostril with the Q-tip. Do not push Q-tip or ointment deep inside you nose. 5. Press your nostrils together and massage for a few seconds. 6. Wash your hands with  gel or soap and water after you are finished. 7. Do not get ointment near your eyes. If it gets into your eyes, rinse them with cool water. 8. If you need to use nasal spray, clean the tip of the bottle with alcohol before use and do not use both at the same time. 9. If you are scheduled for COVID testing during the 5 days, do NOT apply morning dose until after the COVID test has been performed. How to use Chlorhexidine (CHG) 4% liquid soap  1. Purchase an 8 ounce bottle of CHG liquid soap (Chlorhexidine 4%, Hibiclens, Hex-A-Clens or store brand) at a pharmacy or grocery store. 2. Wash your hair with your normal shampoo and your body with regular soap and rinse well to remove shampoo and soap from your skin. 3. Wet a clean washcloth and turn off the shower. 4. Put CHG soap on washcloth and apply to your entire body from the neck down. Do not use on your head, face or private parts(genitals).  Do not use CHG soap on open sores, wounds or areas of skin irritation. 5. Wash your body gently for 5 minutes. Do not wash your skin too hard. This soap does not create lather. Pay special attention to your underarms and from your belly button to your feet. 6. Turn the shower back on and rinse well to get CHG soap off your body. 7. Pat your skin dry with a clean, dry towel. Do not apply lotions or moisturizer. 8. Put on clean clothes and sleep on fresh bed sheets the night before surgery. Do not allow pets to sleep with you. Eating and Drinking Before Surgery     You may eat a regular dinner at the usual time on the day before your surgery.  Do NOT eat any solid foods after midnight unless your arrival time at the hospital is 3pm or later.  You may drink clear liquids only from 12 midnight until 1 hours prior to your arrival time at the hospital on the day of your surgery. Do NOT drink alcohol.  Clear liquids include:  o Water  o Fruit juices without pulp( i.e. apple juice)  o Carbonated beverages  o Black coffee (no cream/milk)  o Tea (no cream/milk)  o Gatorade   You may drink up to 12-16 ounces at one time every 4 hours between the hours of midnight and 1 hour before your arrival time at the hospital. Example- if your arrival time at the hospital is 6am, you may drink 12-16 ounces of clear liquids no later than 5am.   If your arrival time at the hospital is 3pm or later, you may eat a light breakfast before 8am.   A light breakfast includes:  o Toast or bagel (no butter)  o Black coffee (no cream/milk)  o Tea (no cream/milk)  o Fruit juices without pulp ( i.e. apple juice)  o Do NOT eat meat, eggs, vegetables or fruit   If you have any questions, please contact your surgeon's office. [address]   Instructions for Pre-Surgery COVID-19 Testing     Across our ministry we have established standard guidelines to ensure the health and safety of our patients, residents and associates as we resume elective services for patients.  All patients presenting for surgery are required to have a COVID-19 test result within 96 hours of their scheduled surgery. [facility] is providing this test free of charge to the patient. Instructions for COVID-19 Testing:     Patients will receive a call from Pre-Admission Testing 4-5 days prior to surgery to schedule a date and time to come to the [address] [place] [address] Drive for their COVID-19 test   Patients are advised to self-quarantine after testing until their scheduled surgery   Once on site, patients will be registered and receive COVID test in their vehicle   If a patient is scheduled for normal Pre Admission Testing 96 hours from date of surgery, the patient will still have their COVID test done at the [address] [place] [address] located at [address] [place] [address] Positive results will be shared with the surgeon and anesthesiologist and may result in cancellation of the elective procedure    Testing Hours and Location:   Address:  [address] [place] [address] Up Pre Admission [facility] in the Discharge Lot On license of [facility] (Map Attached)  [address], [address]   Hours: Monday- Friday 7a-3p    PAT Phone Number: [phone]            Patient Information Regarding COVID Restrictions    Patients are advised to self-quarantine after COVID testing up to the day of the scheduled procedure. Day of Procedure     Please park in the parking deck or any designated visitor parking lot.  Enter the facility through the Main Entrance of the hospital.   A temperature check and appropriate symptom/exposure screening will be done prior to entry to the facility.  On the day of surgery, please provide the cell phone number of the person who will be waiting for you to the Patient Access representative at the time of registration.  Please wear a mask on the day of your procedure.  We are now allowing one designated visitor per stay.  Pediatric patients may have 2 designated visitors. This one person may come in with you on the day of your procedure.  No visitors under the age of 13.  The designated visitor must also wear a mask.  Once your procedure and the immediate recovery period is completed, a nurse in the recovery area will contact your designated visitor to inform them of your room number or to otherwise review other pertinent information regarding your care.  Social distancing practices are to be adhered to in waiting areas and the cafeteria. The patient was contacted in person. She verbalized understanding of all instructions does not  need reinforcement. COPY OF COVID VACCINATION CARD PLACED ON CHART.

## 2022-02-05 LAB
ATRIAL RATE: 70 BPM
CALCULATED P AXIS, ECG09: 82 DEGREES
CALCULATED R AXIS, ECG10: 19 DEGREES
CALCULATED T AXIS, ECG11: 39 DEGREES
DIAGNOSIS, 93000: NORMAL
P-R INTERVAL, ECG05: 140 MS
Q-T INTERVAL, ECG07: 396 MS
QRS DURATION, ECG06: 70 MS
QTC CALCULATION (BEZET), ECG08: 427 MS
VENTRICULAR RATE, ECG03: 70 BPM

## 2022-02-06 LAB
BACTERIA SPEC CULT: NORMAL
BACTERIA SPEC CULT: NORMAL
SERVICE CMNT-IMP: NORMAL

## 2022-02-07 NOTE — PERIOP NOTES
[facility] [place] [address] office re: labs from 2/4/22 BUN 26, creatininie 1.46, Hgb 10.4, Hct 33.3.

## 2022-02-07 NOTE — PERIOP NOTES
PAT Nurse Practitioner   Pre-Operative Chart Review/Assessment:-ORTHOPEDIC/NEUROSURGICAL SPINE                Patient Name:  Gabby Freire                                                           Age:   77 y.o.    :  1955     Today's Date:  2022     Date of PAT:   2022      Date of Surgery:    2022      Procedure(s):  C5-C7 ACDF     Surgeon:   Dr. Chayito Muñiz:       1)  PCP: Dr. Jerrald Cabot        2)  Cardiac Clearance: Pt followed by Dr. Juan Mayo). LOV 21. Condition stable at that time. No new symptoms or changes to current regimen. EKG from PAT NSR.       3)  Diabetic Treatment Consult: Not indicated. A1c-5.9       4)  Sleep Apnea evaluation:  Not indicated LINDA Score 2.       5)  Treatment for MRSA/Staph Aureus: Neg       6)  Additional Concerns: Former smoker, HTN, GERD, SVT, COPD, dep/anx, [place] L side              Vital Signs:         Vitals:    22 1519   BP: 123/76   Pulse: 84   Resp: 12   Temp: 98 °F (36.7 °C)   Weight: 86.3 kg (190 lb 4.1 oz)   Height: 5' 6\" (1.676 m)            ____________________________________________  PAST MEDICAL HISTORY  Past Medical History:   Diagnosis Date    Chronic obstructive pulmonary disease ([address]     Chronic pain     BOTH ARMS AND NECK    Depression with anxiety     GERD (gastroesophageal reflux disease)     Hypertension     no longer on meds      ____________________________________________  PAST SURGICAL HISTORY  Past Surgical History:   Procedure Laterality Date    HX CHOLECYSTECTOMY      HX COLONOSCOPY      HX ENDOSCOPY      HX ORTHOPAEDIC  1999    LOWER BACK - HERNIATED DISC    HX ORTHOPAEDIC      RIGHT WRIST    HX ROTATOR CUFF REPAIR Left     HX TONSILLECTOMY  CHILDHOOD     ____________________________________________  HOME MEDICATIONS    Current Outpatient Medications   Medication Sig    omeprazole (PRILOSEC) 20 mg capsule Take 20 mg by mouth daily.     irbesartan (AVAPRO) 300 mg tablet Take 300 mg by mouth daily.  montelukast (SINGULAIR) 10 mg tablet Take 10 mg by mouth daily.  fexofenadine-pseudoephedrine (Allegra-D 12 Hour)  mg Tb12 Take 1 Tablet by mouth daily.  budesonide-formoteroL (Symbicort) 160-4.5 mcg/actuation HFAA 2 Puffs as needed.  tiotropium (Spiriva with HandiHaler) 18 mcg inhalation capsule Take 1 Capsule by mouth daily.  rosuvastatin (CRESTOR) 10 mg tablet Take 1 Tablet by mouth nightly.  LORazepam (ATIVAN) 0.5 mg tablet Take 0.5 mg by mouth two (2) times a day.  meloxicam (MOBIC) 15 mg tablet Take 15 mg by mouth nightly.  dilTIAZem ER (CARDIZEM CD) 240 mg capsule Take 1 Capsule by mouth nightly.  gabapentin (Neurontin) 300 mg capsule Take 1 Capsule by mouth nightly. Max Daily Amount: 300 mg.    zolpidem (AMBIEN) 10 mg tablet Take 10 mg by mouth nightly as needed for Sleep.  multivitamins-minerals-lutein (CENTRUM SILVER) Tab Take  by mouth.  Calcium-Cholecalciferol, D3, (CALCIUM 600 WITH VITAMIN D3) 600 mg(1,500mg) -400 unit cap Take 1 Capsule by mouth two (2) times a day.      No current facility-administered medications for this encounter.      ____________________________________________  ALLERGIES  Allergies   Allergen Reactions    Amlodipine Vertigo    Ace Inhibitors Cough    Codeine Itching      ____________________________________________  SOCIAL HISTORY  Social History     Tobacco Use    Smoking status: Former Smoker     Packs/day: 1.50     Years: 30.00     Pack years: 45.00     Quit date: 2012     Years since quittin.1    Smokeless tobacco: Never Used   Substance Use Topics    Alcohol use: Yes     Comment: OCC      ____________________________________________   Internal Administration   First Dose COVID-19, Maria Elena Bran, Primary or Immunocompromised Series, MRNA, PF, 100mcg/0.5mL  2021   Second Dose COVID-19, Maria Elena Bran, Primary or Immunocompromised Series, MRNA, PF, 100mcg/0.5mL  2021      Last COVID Lab SARS-CoV-2 ( )   Date Value   02/08/2022 Not detected      ____________________________________________    Labs:     Hospital Outpatient Visit on 02/04/2022   Component Date Value Ref Range Status    Sodium 02/04/2022 138  136 - 145 mmol/L Final    Potassium 02/04/2022 4.5  3.5 - 5.1 mmol/L Final    Chloride 02/04/2022 106  97 - 108 mmol/L Final    CO2 02/04/2022 27  21 - 32 mmol/L Final    Anion gap 02/04/2022 5  5 - 15 mmol/L Final    Glucose 02/04/2022 94  65 - 100 mg/dL Final    BUN 02/04/2022 26* 6 - 20 MG/DL Final    Creatinine 02/04/2022 1.46* 0.55 - 1.02 MG/DL Final    BUN/Creatinine ratio 02/04/2022 18  12 - 20   Final    GFR est AA 02/04/2022 43* >60 ml/min/1.73m2 Final    GFR est non-AA 02/04/2022 36* >60 ml/min/1.73m2 Final    Estimated GFR is calculated using the IDMS-traceable Modification of Diet in Renal Disease (MDRD) Study equation, reported for both  Americans (GFRAA) and non- Americans (GFRNA), and normalized to 1.73m2 body surface area. The physician must decide which value applies to the patient.     Calcium 02/04/2022 9.3  8.5 - 10.1 MG/DL Final    WBC 02/04/2022 8.6  3.6 - 11.0 K/uL Final    RBC 02/04/2022 3.56* 3.80 - 5.20 M/uL Final    HGB 02/04/2022 10.4* 11.5 - 16.0 g/dL Final    HCT 02/04/2022 33.3* 35.0 - 47.0 % Final    MCV 02/04/2022 93.5  80.0 - 99.0 FL Final    MCH 02/04/2022 29.2  26.0 - 34.0 PG Final    MCHC 02/04/2022 31.2  30.0 - 36.5 g/dL Final    RDW 02/04/2022 14.0  11.5 - 14.5 % Final    PLATELET 86/08/2619 124  150 - 400 K/uL Final    MPV 02/04/2022 11.9  8.9 - 12.9 FL Final    NRBC 02/04/2022 0.0  0  WBC Final    ABSOLUTE NRBC 02/04/2022 0.00  0.00 - 0.01 K/uL Final    Crossmatch Expiration 02/04/2022 02/14/2022,2359   Final    ABO/Rh(D) 02/04/2022 A POSITIVE   Final    Antibody screen 02/04/2022 NEG   Final    INR 02/04/2022 1.0  0.9 - 1.1   Final    A single therapeutic range for Vit K antagonists may not be optimal for all indications - see June, 2008 issue of Chest, American College of Chest Physicians Evidence-Based Clinical Practice Guidelines, 8th Edition.  Prothrombin time 02/04/2022 10.3  9.0 - 11.1 sec Final    Color 02/04/2022 YELLOW/STRAW    Final    Color Reference Range: Straw, Yellow or Dark Yellow    Appearance 02/04/2022 CLEAR  CLEAR   Final    Specific gravity 02/04/2022 1.016  1.003 - 1.030   Final    pH (UA) 02/04/2022 7.5  5.0 - 8.0   Final    Protein 02/04/2022 Negative  NEG mg/dL Final    Glucose 02/04/2022 Negative  NEG mg/dL Final    Ketone 02/04/2022 Negative  NEG mg/dL Final    Bilirubin 02/04/2022 Negative  NEG   Final    Blood 02/04/2022 Negative  NEG   Final    Urobilinogen 02/04/2022 1.0  0.2 - 1.0 EU/dL Final    Nitrites 02/04/2022 Negative  NEG   Final    Leukocyte Esterase 02/04/2022 TRACE* NEG   Final    UA:UC IF INDICATED 02/04/2022 CULTURE NOT INDICATED BY UA RESULT  CNI   Final    WBC 02/04/2022 5-10  0 - 4 /hpf Final    RBC 02/04/2022 0-5  0 - 5 /hpf Final    Epithelial cells 02/04/2022 MODERATE* FEW /lpf Final    Epithelial cell category consists of squamous cells and /or transitional urothelial cells. Renal tubular cells, if present, are separately identified as such.     Bacteria 02/04/2022 Negative  NEG /hpf Final    Hyaline cast 02/04/2022 0-2  0 - 5 /lpf Final    Ventricular Rate 02/04/2022 70  BPM Final    Atrial Rate 02/04/2022 70  BPM Final    P-R Interval 02/04/2022 140  ms Final    QRS Duration 02/04/2022 70  ms Final    Q-T Interval 02/04/2022 396  ms Final    QTC Calculation (Bezet) 02/04/2022 427  ms Final    Calculated P Axis 02/04/2022 82  degrees Final    Calculated R Axis 02/04/2022 19  degrees Final    Calculated T Axis 02/04/2022 39  degrees Final    Diagnosis 02/04/2022    Final                    Value:Normal sinus rhythm  Normal ECG  No previous ECGs available  Confirmed by Patricia Benítez M.D., Beatriz Spivey ([id number]) on 2/5/2022 7:57:15 AM      Hemoglobin A1c 02/04/2022 5.9* 4.0 - 5.6 % Final    Comment: NEW METHOD  PLEASE NOTE NEW REFERENCE RANGE  (NOTE)  HbA1C Interpretive Ranges  <5.7              Normal  5.7 - 6.4         Consider Prediabetes  >6.5              Consider Diabetes      Est. average glucose 02/04/2022 123  mg/dL Final    Special Requests: 02/04/2022 NO SPECIAL REQUESTS    Final    Culture result: 02/04/2022 MRSA NOT PRESENT    Final       Skin:     Denies open wounds, cuts, sores, rashes or other areas of concern in PAT assessment.         Lilibeth Mask, NP

## 2022-02-08 ENCOUNTER — HOSPITAL ENCOUNTER (OUTPATIENT)
Dept: PREADMISSION TESTING | Age: 67
Discharge: HOME | End: 2022-02-08
Attending: ORTHOPAEDIC SURGERY
Payer: MEDICARE

## 2022-02-08 DIAGNOSIS — Z01.812 PRE-PROCEDURE LAB EXAM: ICD-10-CM

## 2022-02-08 PROCEDURE — U0005 INFEC AGEN DETEC AMPLI PROBE: HCPCS

## 2022-02-09 LAB
SARS-COV-2, XPLCVT: NOT DETECTED
SOURCE, COVRS: NORMAL

## 2022-02-11 ENCOUNTER — HOSPITAL ENCOUNTER (OUTPATIENT)
Age: 67
Discharge: HOME | End: 2022-02-16
Attending: ORTHOPAEDIC SURGERY | Admitting: ORTHOPAEDIC SURGERY
Payer: MEDICARE

## 2022-02-11 ENCOUNTER — ANESTHESIA EVENT (OUTPATIENT)
Dept: SURGERY | Age: 67
End: 2022-02-11
Payer: MEDICARE

## 2022-02-11 ENCOUNTER — APPOINTMENT (OUTPATIENT)
Dept: GENERAL RADIOLOGY | Age: 67
End: 2022-02-11
Attending: ORTHOPAEDIC SURGERY
Payer: MEDICARE

## 2022-02-11 ENCOUNTER — ANESTHESIA (OUTPATIENT)
Dept: SURGERY | Age: 67
End: 2022-02-11
Payer: MEDICARE

## 2022-02-11 DIAGNOSIS — M50.90 CERVICAL DISC DISEASE: ICD-10-CM

## 2022-02-11 DIAGNOSIS — Z98.1 S/P CERVICAL SPINAL FUSION: Primary | ICD-10-CM

## 2022-02-11 DIAGNOSIS — M54.2 NECK PAIN: ICD-10-CM

## 2022-02-11 DIAGNOSIS — M48.02 CERVICAL STENOSIS OF SPINE: ICD-10-CM

## 2022-02-11 LAB
GLUCOSE BLD STRIP.AUTO-MCNC: 108 MG/DL (ref 65–117)
SERVICE CMNT-IMP: NORMAL

## 2022-02-11 PROCEDURE — C1889 IMPLANT/INSERT DEVICE, NOC: HCPCS | Performed by: ORTHOPAEDIC SURGERY

## 2022-02-11 PROCEDURE — 22552 ARTHRD ANT NTRBD CERVICAL EA: CPT | Performed by: ORTHOPAEDIC SURGERY

## 2022-02-11 PROCEDURE — 74011250637 HC RX REV CODE- 250/637: Performed by: STUDENT IN AN ORGANIZED HEALTH CARE EDUCATION/TRAINING PROGRAM

## 2022-02-11 PROCEDURE — 22845 INSERT SPINE FIXATION DEVICE: CPT | Performed by: STUDENT IN AN ORGANIZED HEALTH CARE EDUCATION/TRAINING PROGRAM

## 2022-02-11 PROCEDURE — 22845 INSERT SPINE FIXATION DEVICE: CPT | Performed by: ORTHOPAEDIC SURGERY

## 2022-02-11 PROCEDURE — 2709999900 HC NON-CHARGEABLE SUPPLY

## 2022-02-11 PROCEDURE — 74011000250 HC RX REV CODE- 250: Performed by: NURSE ANESTHETIST, CERTIFIED REGISTERED

## 2022-02-11 PROCEDURE — 22853 INSJ BIOMECHANICAL DEVICE: CPT | Performed by: ORTHOPAEDIC SURGERY

## 2022-02-11 PROCEDURE — 77030037713 HC CLOSR DEV INCIS ZIP STRY -B: Performed by: ORTHOPAEDIC SURGERY

## 2022-02-11 PROCEDURE — 74011250636 HC RX REV CODE- 250/636: Performed by: STUDENT IN AN ORGANIZED HEALTH CARE EDUCATION/TRAINING PROGRAM

## 2022-02-11 PROCEDURE — 77030003832 HC BIT DRL ATLNTS MEDT -B: Performed by: ORTHOPAEDIC SURGERY

## 2022-02-11 PROCEDURE — 74011250636 HC RX REV CODE- 250/636: Performed by: NURSE ANESTHETIST, CERTIFIED REGISTERED

## 2022-02-11 PROCEDURE — 2709999900 HC NON-CHARGEABLE SUPPLY: Performed by: ORTHOPAEDIC SURGERY

## 2022-02-11 PROCEDURE — 76060000035 HC ANESTHESIA 2 TO 2.5 HR: Performed by: ORTHOPAEDIC SURGERY

## 2022-02-11 PROCEDURE — 74011000250 HC RX REV CODE- 250: Performed by: ORTHOPAEDIC SURGERY

## 2022-02-11 PROCEDURE — 77030029099 HC BN WAX SSPC -A: Performed by: ORTHOPAEDIC SURGERY

## 2022-02-11 PROCEDURE — 77030031139 HC SUT VCRL2 J&J -A: Performed by: ORTHOPAEDIC SURGERY

## 2022-02-11 PROCEDURE — C1713 ANCHOR/SCREW BN/BN,TIS/BN: HCPCS | Performed by: ORTHOPAEDIC SURGERY

## 2022-02-11 PROCEDURE — 22551 ARTHRD ANT NTRBDY CERVICAL: CPT | Performed by: STUDENT IN AN ORGANIZED HEALTH CARE EDUCATION/TRAINING PROGRAM

## 2022-02-11 PROCEDURE — 74011000250 HC RX REV CODE- 250: Performed by: STUDENT IN AN ORGANIZED HEALTH CARE EDUCATION/TRAINING PROGRAM

## 2022-02-11 PROCEDURE — 82962 GLUCOSE BLOOD TEST: CPT

## 2022-02-11 PROCEDURE — 77030014650 HC SEAL MTRX FLOSEL BAXT -C: Performed by: ORTHOPAEDIC SURGERY

## 2022-02-11 PROCEDURE — 22853 INSJ BIOMECHANICAL DEVICE: CPT | Performed by: STUDENT IN AN ORGANIZED HEALTH CARE EDUCATION/TRAINING PROGRAM

## 2022-02-11 PROCEDURE — 77030038600 HC TU BPLR IRR DISP STRY -B: Performed by: ORTHOPAEDIC SURGERY

## 2022-02-11 PROCEDURE — 22551 ARTHRD ANT NTRBDY CERVICAL: CPT | Performed by: ORTHOPAEDIC SURGERY

## 2022-02-11 PROCEDURE — 76010000162 HC OR TIME 1.5 TO 2 HR INTENSV-TIER 1: Performed by: ORTHOPAEDIC SURGERY

## 2022-02-11 PROCEDURE — 22552 ARTHRD ANT NTRBD CERVICAL EA: CPT | Performed by: STUDENT IN AN ORGANIZED HEALTH CARE EDUCATION/TRAINING PROGRAM

## 2022-02-11 PROCEDURE — 76210000016 HC OR PH I REC 1 TO 1.5 HR: Performed by: ORTHOPAEDIC SURGERY

## 2022-02-11 PROCEDURE — 74011250636 HC RX REV CODE- 250/636: Performed by: PHYSICIAN ASSISTANT

## 2022-02-11 PROCEDURE — 77030035236 HC SUT PDS STRATFX BARB J&J -B: Performed by: ORTHOPAEDIC SURGERY

## 2022-02-11 PROCEDURE — 77030004391 HC BUR FLUT MEDT -C: Performed by: ORTHOPAEDIC SURGERY

## 2022-02-11 PROCEDURE — 74011000250 HC RX REV CODE- 250: Performed by: PHYSICIAN ASSISTANT

## 2022-02-11 DEVICE — GRAFT HUM TISS 3X4 CM WND COVERING AMNIO MEMBRN VERSASHIELD: Type: IMPLANTABLE DEVICE | Site: SPINE CERVICAL | Status: FUNCTIONAL

## 2022-02-11 DEVICE — CAGE 5030764 ANATOMIC PTC 16X14X7MM
Type: IMPLANTABLE DEVICE | Site: SPINE CERVICAL | Status: FUNCTIONAL
Brand: ANATOMIC PEEK PTC CERVICAL FUSION SYSTEM

## 2022-02-11 DEVICE — PLATE 3002035 ZEVO 35MM 2 LVL
Type: IMPLANTABLE DEVICE | Site: SPINE CERVICAL | Status: FUNCTIONAL
Brand: ZEVO™ ANTERIOR CERVICAL PLATE SYSTEM

## 2022-02-11 DEVICE — I-FACTOR PUTTY, 1.0CC SYRINGE
Type: IMPLANTABLE DEVICE | Site: SPINE CERVICAL | Status: FUNCTIONAL
Brand: I-FACTOR PEPTIDE ENHANCED BONE GRAFT

## 2022-02-11 DEVICE — GRAFT BNE SUB SM CANC FRZN MORSELIZED W/ VIABLE CELL: Type: IMPLANTABLE DEVICE | Site: SPINE CERVICAL | Status: FUNCTIONAL

## 2022-02-11 RX ORDER — HYDROMORPHONE HYDROCHLORIDE 2 MG/ML
INJECTION, SOLUTION INTRAMUSCULAR; INTRAVENOUS; SUBCUTANEOUS AS NEEDED
Status: DISCONTINUED | OUTPATIENT
Start: 2022-02-11 | End: 2022-02-11 | Stop reason: HOSPADM

## 2022-02-11 RX ORDER — MIDAZOLAM HYDROCHLORIDE 1 MG/ML
1 INJECTION, SOLUTION INTRAMUSCULAR; INTRAVENOUS AS NEEDED
Status: DISCONTINUED | OUTPATIENT
Start: 2022-02-11 | End: 2022-02-11 | Stop reason: HOSPADM

## 2022-02-11 RX ORDER — SODIUM CHLORIDE 0.9 % (FLUSH) 0.9 %
5-40 SYRINGE (ML) INJECTION EVERY 8 HOURS
Status: DISCONTINUED | OUTPATIENT
Start: 2022-02-11 | End: 2022-02-11 | Stop reason: HOSPADM

## 2022-02-11 RX ORDER — DIPHENHYDRAMINE HYDROCHLORIDE 50 MG/ML
12.5 INJECTION, SOLUTION INTRAMUSCULAR; INTRAVENOUS
Status: ACTIVE | OUTPATIENT
Start: 2022-02-11 | End: 2022-02-12

## 2022-02-11 RX ORDER — OXYCODONE HYDROCHLORIDE 5 MG/1
5 TABLET ORAL
Status: DISCONTINUED | OUTPATIENT
Start: 2022-02-11 | End: 2022-02-15

## 2022-02-11 RX ORDER — SODIUM CHLORIDE, SODIUM LACTATE, POTASSIUM CHLORIDE, CALCIUM CHLORIDE 600; 310; 30; 20 MG/100ML; MG/100ML; MG/100ML; MG/100ML
125 INJECTION, SOLUTION INTRAVENOUS CONTINUOUS
Status: DISCONTINUED | OUTPATIENT
Start: 2022-02-11 | End: 2022-02-11 | Stop reason: HOSPADM

## 2022-02-11 RX ORDER — LORAZEPAM 0.5 MG/1
0.5 TABLET ORAL EVERY 12 HOURS
Status: DISCONTINUED | OUTPATIENT
Start: 2022-02-11 | End: 2022-02-15

## 2022-02-11 RX ORDER — SODIUM CHLORIDE 0.9 % (FLUSH) 0.9 %
5-40 SYRINGE (ML) INJECTION EVERY 8 HOURS
Status: DISCONTINUED | OUTPATIENT
Start: 2022-02-11 | End: 2022-02-16 | Stop reason: HOSPADM

## 2022-02-11 RX ORDER — SODIUM CHLORIDE 9 MG/ML
125 INJECTION, SOLUTION INTRAVENOUS CONTINUOUS
Status: DISPENSED | OUTPATIENT
Start: 2022-02-11 | End: 2022-02-12

## 2022-02-11 RX ORDER — ROCURONIUM BROMIDE 10 MG/ML
INJECTION, SOLUTION INTRAVENOUS AS NEEDED
Status: DISCONTINUED | OUTPATIENT
Start: 2022-02-11 | End: 2022-02-11 | Stop reason: HOSPADM

## 2022-02-11 RX ORDER — GABAPENTIN 300 MG/1
300 CAPSULE ORAL
Status: DISCONTINUED | OUTPATIENT
Start: 2022-02-11 | End: 2022-02-15

## 2022-02-11 RX ORDER — AMOXICILLIN 250 MG
1 CAPSULE ORAL 2 TIMES DAILY
Status: DISCONTINUED | OUTPATIENT
Start: 2022-02-11 | End: 2022-02-16 | Stop reason: HOSPADM

## 2022-02-11 RX ORDER — SODIUM CHLORIDE 0.9 % (FLUSH) 0.9 %
5-40 SYRINGE (ML) INJECTION AS NEEDED
Status: DISCONTINUED | OUTPATIENT
Start: 2022-02-11 | End: 2022-02-16 | Stop reason: HOSPADM

## 2022-02-11 RX ORDER — MIDAZOLAM HYDROCHLORIDE 1 MG/ML
1 INJECTION, SOLUTION INTRAMUSCULAR; INTRAVENOUS
Status: DISCONTINUED | OUTPATIENT
Start: 2022-02-11 | End: 2022-02-11 | Stop reason: HOSPADM

## 2022-02-11 RX ORDER — PANTOPRAZOLE SODIUM 40 MG/1
40 TABLET, DELAYED RELEASE ORAL
Status: DISCONTINUED | OUTPATIENT
Start: 2022-02-12 | End: 2022-02-16 | Stop reason: HOSPADM

## 2022-02-11 RX ORDER — DEXTROSE, SODIUM CHLORIDE, SODIUM LACTATE, POTASSIUM CHLORIDE, AND CALCIUM CHLORIDE 5; .6; .31; .03; .02 G/100ML; G/100ML; G/100ML; G/100ML; G/100ML
125 INJECTION, SOLUTION INTRAVENOUS CONTINUOUS
Status: DISCONTINUED | OUTPATIENT
Start: 2022-02-11 | End: 2022-02-11 | Stop reason: HOSPADM

## 2022-02-11 RX ORDER — ONDANSETRON 2 MG/ML
4 INJECTION INTRAMUSCULAR; INTRAVENOUS
Status: ACTIVE | OUTPATIENT
Start: 2022-02-11 | End: 2022-02-12

## 2022-02-11 RX ORDER — ZOLPIDEM TARTRATE 5 MG/1
10 TABLET ORAL
Status: DISCONTINUED | OUTPATIENT
Start: 2022-02-11 | End: 2022-02-15

## 2022-02-11 RX ORDER — PROPOFOL 10 MG/ML
INJECTION, EMULSION INTRAVENOUS
Status: DISCONTINUED | OUTPATIENT
Start: 2022-02-11 | End: 2022-02-11 | Stop reason: HOSPADM

## 2022-02-11 RX ORDER — PROPOFOL 10 MG/ML
INJECTION, EMULSION INTRAVENOUS AS NEEDED
Status: DISCONTINUED | OUTPATIENT
Start: 2022-02-11 | End: 2022-02-11 | Stop reason: HOSPADM

## 2022-02-11 RX ORDER — ACETAMINOPHEN 500 MG
1000 TABLET ORAL EVERY 6 HOURS
Status: DISCONTINUED | OUTPATIENT
Start: 2022-02-11 | End: 2022-02-16 | Stop reason: HOSPADM

## 2022-02-11 RX ORDER — FENTANYL CITRATE 50 UG/ML
INJECTION, SOLUTION INTRAMUSCULAR; INTRAVENOUS AS NEEDED
Status: DISCONTINUED | OUTPATIENT
Start: 2022-02-11 | End: 2022-02-11 | Stop reason: HOSPADM

## 2022-02-11 RX ORDER — OXYCODONE HYDROCHLORIDE 5 MG/1
5 TABLET ORAL AS NEEDED
Status: DISCONTINUED | OUTPATIENT
Start: 2022-02-11 | End: 2022-02-11 | Stop reason: HOSPADM

## 2022-02-11 RX ORDER — CYCLOBENZAPRINE HCL 10 MG
10 TABLET ORAL
Status: DISCONTINUED | OUTPATIENT
Start: 2022-02-11 | End: 2022-02-16 | Stop reason: HOSPADM

## 2022-02-11 RX ORDER — NALOXONE HYDROCHLORIDE 4 MG/.1ML
SPRAY NASAL
Qty: 1 EACH | Refills: 0 | Status: SHIPPED
Start: 2022-02-11 | End: 2022-08-22

## 2022-02-11 RX ORDER — FENTANYL CITRATE 50 UG/ML
50 INJECTION, SOLUTION INTRAMUSCULAR; INTRAVENOUS AS NEEDED
Status: DISCONTINUED | OUTPATIENT
Start: 2022-02-11 | End: 2022-02-11 | Stop reason: HOSPADM

## 2022-02-11 RX ORDER — PHENYLEPHRINE HCL IN 0.9% NACL 0.4MG/10ML
SYRINGE (ML) INTRAVENOUS AS NEEDED
Status: DISCONTINUED | OUTPATIENT
Start: 2022-02-11 | End: 2022-02-11 | Stop reason: HOSPADM

## 2022-02-11 RX ORDER — ACETAMINOPHEN 325 MG/1
650 TABLET ORAL ONCE
Status: DISCONTINUED | OUTPATIENT
Start: 2022-02-11 | End: 2022-02-11 | Stop reason: HOSPADM

## 2022-02-11 RX ORDER — LIDOCAINE HYDROCHLORIDE 20 MG/ML
INJECTION, SOLUTION EPIDURAL; INFILTRATION; INTRACAUDAL; PERINEURAL AS NEEDED
Status: DISCONTINUED | OUTPATIENT
Start: 2022-02-11 | End: 2022-02-11 | Stop reason: HOSPADM

## 2022-02-11 RX ORDER — SODIUM CHLORIDE 0.9 % (FLUSH) 0.9 %
5-40 SYRINGE (ML) INJECTION AS NEEDED
Status: DISCONTINUED | OUTPATIENT
Start: 2022-02-11 | End: 2022-02-11 | Stop reason: HOSPADM

## 2022-02-11 RX ORDER — DILTIAZEM HYDROCHLORIDE 120 MG/1
240 CAPSULE, COATED, EXTENDED RELEASE ORAL
Status: DISCONTINUED | OUTPATIENT
Start: 2022-02-11 | End: 2022-02-16 | Stop reason: HOSPADM

## 2022-02-11 RX ORDER — LIDOCAINE HYDROCHLORIDE 10 MG/ML
0.5 INJECTION, SOLUTION EPIDURAL; INFILTRATION; INTRACAUDAL; PERINEURAL AS NEEDED
Status: DISCONTINUED | OUTPATIENT
Start: 2022-02-11 | End: 2022-02-11 | Stop reason: HOSPADM

## 2022-02-11 RX ORDER — DEXAMETHASONE SODIUM PHOSPHATE 4 MG/ML
INJECTION, SOLUTION INTRA-ARTICULAR; INTRALESIONAL; INTRAMUSCULAR; INTRAVENOUS; SOFT TISSUE AS NEEDED
Status: DISCONTINUED | OUTPATIENT
Start: 2022-02-11 | End: 2022-02-11 | Stop reason: HOSPADM

## 2022-02-11 RX ORDER — MIDAZOLAM HYDROCHLORIDE 1 MG/ML
INJECTION, SOLUTION INTRAMUSCULAR; INTRAVENOUS AS NEEDED
Status: DISCONTINUED | OUTPATIENT
Start: 2022-02-11 | End: 2022-02-11 | Stop reason: HOSPADM

## 2022-02-11 RX ORDER — SODIUM CHLORIDE, SODIUM LACTATE, POTASSIUM CHLORIDE, CALCIUM CHLORIDE 600; 310; 30; 20 MG/100ML; MG/100ML; MG/100ML; MG/100ML
INJECTION, SOLUTION INTRAVENOUS
Status: DISCONTINUED | OUTPATIENT
Start: 2022-02-11 | End: 2022-02-11 | Stop reason: HOSPADM

## 2022-02-11 RX ORDER — MORPHINE SULFATE 2 MG/ML
2 INJECTION, SOLUTION INTRAMUSCULAR; INTRAVENOUS
Status: ACTIVE | OUTPATIENT
Start: 2022-02-11 | End: 2022-02-12

## 2022-02-11 RX ORDER — ONDANSETRON 2 MG/ML
INJECTION INTRAMUSCULAR; INTRAVENOUS AS NEEDED
Status: DISCONTINUED | OUTPATIENT
Start: 2022-02-11 | End: 2022-02-11 | Stop reason: HOSPADM

## 2022-02-11 RX ORDER — CETIRIZINE HCL 10 MG
10 TABLET ORAL DAILY
Status: DISCONTINUED | OUTPATIENT
Start: 2022-02-12 | End: 2022-02-16 | Stop reason: HOSPADM

## 2022-02-11 RX ORDER — NALOXONE HYDROCHLORIDE 0.4 MG/ML
0.4 INJECTION, SOLUTION INTRAMUSCULAR; INTRAVENOUS; SUBCUTANEOUS AS NEEDED
Status: DISCONTINUED | OUTPATIENT
Start: 2022-02-11 | End: 2022-02-16 | Stop reason: HOSPADM

## 2022-02-11 RX ORDER — MORPHINE SULFATE 2 MG/ML
2 INJECTION, SOLUTION INTRAMUSCULAR; INTRAVENOUS
Status: DISCONTINUED | OUTPATIENT
Start: 2022-02-11 | End: 2022-02-11 | Stop reason: HOSPADM

## 2022-02-11 RX ORDER — LOSARTAN POTASSIUM 50 MG/1
100 TABLET ORAL DAILY
Status: DISCONTINUED | OUTPATIENT
Start: 2022-02-12 | End: 2022-02-16 | Stop reason: HOSPADM

## 2022-02-11 RX ORDER — OXYCODONE HYDROCHLORIDE 5 MG/1
5-10 TABLET ORAL
Qty: 60 TABLET | Refills: 0 | Status: SHIPPED | OUTPATIENT
Start: 2022-02-11 | End: 2022-02-16 | Stop reason: SDUPTHER

## 2022-02-11 RX ORDER — FACIAL-BODY WIPES
10 EACH TOPICAL DAILY PRN
Status: DISCONTINUED | OUTPATIENT
Start: 2022-02-13 | End: 2022-02-16 | Stop reason: HOSPADM

## 2022-02-11 RX ORDER — KETAMINE HCL IN 0.9 % NACL 50 MG/5 ML
SYRINGE (ML) INTRAVENOUS AS NEEDED
Status: DISCONTINUED | OUTPATIENT
Start: 2022-02-11 | End: 2022-02-11 | Stop reason: HOSPADM

## 2022-02-11 RX ORDER — OXYCODONE HYDROCHLORIDE 5 MG/1
10 TABLET ORAL
Status: DISCONTINUED | OUTPATIENT
Start: 2022-02-11 | End: 2022-02-16 | Stop reason: HOSPADM

## 2022-02-11 RX ORDER — EPHEDRINE SULFATE/0.9% NACL/PF 50 MG/5 ML
SYRINGE (ML) INTRAVENOUS AS NEEDED
Status: DISCONTINUED | OUTPATIENT
Start: 2022-02-11 | End: 2022-02-11 | Stop reason: HOSPADM

## 2022-02-11 RX ORDER — MONTELUKAST SODIUM 10 MG/1
10 TABLET ORAL DAILY
Status: DISCONTINUED | OUTPATIENT
Start: 2022-02-12 | End: 2022-02-16 | Stop reason: HOSPADM

## 2022-02-11 RX ORDER — FERROUS SULFATE, DRIED 160(50) MG
1 TABLET, EXTENDED RELEASE ORAL 2 TIMES DAILY
Status: DISCONTINUED | OUTPATIENT
Start: 2022-02-11 | End: 2022-02-16 | Stop reason: HOSPADM

## 2022-02-11 RX ORDER — POLYETHYLENE GLYCOL 3350 17 G/17G
17 POWDER, FOR SOLUTION ORAL DAILY
Status: DISCONTINUED | OUTPATIENT
Start: 2022-02-12 | End: 2022-02-16 | Stop reason: HOSPADM

## 2022-02-11 RX ORDER — ROSUVASTATIN CALCIUM 10 MG/1
10 TABLET, COATED ORAL
Status: DISCONTINUED | OUTPATIENT
Start: 2022-02-11 | End: 2022-02-16 | Stop reason: HOSPADM

## 2022-02-11 RX ORDER — ONDANSETRON 2 MG/ML
4 INJECTION INTRAMUSCULAR; INTRAVENOUS AS NEEDED
Status: DISCONTINUED | OUTPATIENT
Start: 2022-02-11 | End: 2022-02-11 | Stop reason: HOSPADM

## 2022-02-11 RX ORDER — SUCCINYLCHOLINE CHLORIDE 20 MG/ML
INJECTION INTRAMUSCULAR; INTRAVENOUS AS NEEDED
Status: DISCONTINUED | OUTPATIENT
Start: 2022-02-11 | End: 2022-02-11 | Stop reason: HOSPADM

## 2022-02-11 RX ORDER — FENTANYL CITRATE 50 UG/ML
25 INJECTION, SOLUTION INTRAMUSCULAR; INTRAVENOUS
Status: DISCONTINUED | OUTPATIENT
Start: 2022-02-11 | End: 2022-02-11 | Stop reason: HOSPADM

## 2022-02-11 RX ORDER — DIPHENHYDRAMINE HYDROCHLORIDE 50 MG/ML
12.5 INJECTION, SOLUTION INTRAMUSCULAR; INTRAVENOUS AS NEEDED
Status: DISCONTINUED | OUTPATIENT
Start: 2022-02-11 | End: 2022-02-11 | Stop reason: HOSPADM

## 2022-02-11 RX ORDER — IPRATROPIUM BROMIDE 0.5 MG/2.5ML
0.5 SOLUTION RESPIRATORY (INHALATION) DAILY
Status: DISCONTINUED | OUTPATIENT
Start: 2022-02-12 | End: 2022-02-16 | Stop reason: HOSPADM

## 2022-02-11 RX ADMIN — HYDROMORPHONE HYDROCHLORIDE 0.5 MG: 2 INJECTION, SOLUTION INTRAMUSCULAR; INTRAVENOUS; SUBCUTANEOUS at 12:17

## 2022-02-11 RX ADMIN — SODIUM CHLORIDE 125 ML/HR: 9 INJECTION, SOLUTION INTRAVENOUS at 12:57

## 2022-02-11 RX ADMIN — HYDROMORPHONE HYDROCHLORIDE 0.5 MG: 2 INJECTION, SOLUTION INTRAMUSCULAR; INTRAVENOUS; SUBCUTANEOUS at 11:12

## 2022-02-11 RX ADMIN — OXYCODONE 5 MG: 5 TABLET ORAL at 19:45

## 2022-02-11 RX ADMIN — SODIUM CHLORIDE, POTASSIUM CHLORIDE, SODIUM LACTATE AND CALCIUM CHLORIDE: 600; 310; 30; 20 INJECTION, SOLUTION INTRAVENOUS at 10:14

## 2022-02-11 RX ADMIN — Medication 20 MG: at 10:32

## 2022-02-11 RX ADMIN — LORAZEPAM 0.5 MG: 0.5 TABLET ORAL at 23:22

## 2022-02-11 RX ADMIN — OXYCODONE 5 MG: 5 TABLET ORAL at 23:23

## 2022-02-11 RX ADMIN — SENNOSIDES AND DOCUSATE SODIUM 1 TABLET: 50; 8.6 TABLET ORAL at 19:43

## 2022-02-11 RX ADMIN — ROSUVASTATIN CALCIUM 10 MG: 10 TABLET, FILM COATED ORAL at 23:23

## 2022-02-11 RX ADMIN — HYDROMORPHONE HYDROCHLORIDE 0.5 MG: 2 INJECTION, SOLUTION INTRAMUSCULAR; INTRAVENOUS; SUBCUTANEOUS at 12:10

## 2022-02-11 RX ADMIN — ONDANSETRON HYDROCHLORIDE 4 MG: 2 INJECTION, SOLUTION INTRAMUSCULAR; INTRAVENOUS at 12:01

## 2022-02-11 RX ADMIN — Medication 1 TABLET: at 19:43

## 2022-02-11 RX ADMIN — SODIUM CHLORIDE 125 ML/HR: 9 INJECTION, SOLUTION INTRAVENOUS at 20:28

## 2022-02-11 RX ADMIN — SUCCINYLCHOLINE CHLORIDE 120 MG: 20 INJECTION, SOLUTION INTRAMUSCULAR; INTRAVENOUS at 10:17

## 2022-02-11 RX ADMIN — MIDAZOLAM 2 MG: 1 INJECTION INTRAMUSCULAR; INTRAVENOUS at 10:14

## 2022-02-11 RX ADMIN — Medication 25 MG: at 10:42

## 2022-02-11 RX ADMIN — Medication 120 MCG: at 10:38

## 2022-02-11 RX ADMIN — LIDOCAINE HYDROCHLORIDE 60 MG: 20 INJECTION, SOLUTION EPIDURAL; INFILTRATION; INTRACAUDAL; PERINEURAL at 10:17

## 2022-02-11 RX ADMIN — PROPOFOL 150 MG: 10 INJECTION, EMULSION INTRAVENOUS at 10:17

## 2022-02-11 RX ADMIN — Medication 120 MCG: at 10:17

## 2022-02-11 RX ADMIN — ROCURONIUM BROMIDE 10 MG: 10 SOLUTION INTRAVENOUS at 10:17

## 2022-02-11 RX ADMIN — PHENYLEPHRINE HYDROCHLORIDE 120 MCG/MIN: 10 INJECTION INTRAVENOUS at 10:38

## 2022-02-11 RX ADMIN — DEXAMETHASONE SODIUM PHOSPHATE 4 MG: 4 INJECTION, SOLUTION INTRAMUSCULAR; INTRAVENOUS at 10:30

## 2022-02-11 RX ADMIN — ACETAMINOPHEN 1000 MG: 500 TABLET ORAL at 16:18

## 2022-02-11 RX ADMIN — PROPOFOL 100 MCG/KG/MIN: 10 INJECTION, EMULSION INTRAVENOUS at 10:17

## 2022-02-11 RX ADMIN — DILTIAZEM HYDROCHLORIDE 240 MG: 120 CAPSULE, COATED, EXTENDED RELEASE ORAL at 23:23

## 2022-02-11 RX ADMIN — GABAPENTIN 300 MG: 300 CAPSULE ORAL at 23:23

## 2022-02-11 RX ADMIN — WATER 2 G: 1 INJECTION INTRAMUSCULAR; INTRAVENOUS; SUBCUTANEOUS at 10:32

## 2022-02-11 RX ADMIN — FENTANYL CITRATE 100 MCG: 50 INJECTION, SOLUTION INTRAMUSCULAR; INTRAVENOUS at 10:17

## 2022-02-11 RX ADMIN — WATER 2 G: 1 INJECTION INTRAMUSCULAR; INTRAVENOUS; SUBCUTANEOUS at 19:43

## 2022-02-11 RX ADMIN — ACETAMINOPHEN 1000 MG: 500 TABLET ORAL at 23:23

## 2022-02-11 RX ADMIN — HYDROMORPHONE HYDROCHLORIDE 0.5 MG: 2 INJECTION, SOLUTION INTRAMUSCULAR; INTRAVENOUS; SUBCUTANEOUS at 10:58

## 2022-02-11 NOTE — PERIOP NOTES
TRANSFER - OUT REPORT:    Verbal report given to Talib RN on Mary Lucas  being transferred to 11 S for routine post - op       Report consisted of patients Situation, Background, Assessment and   Recommendations(SBAR). Time Pre op antibiotic given:1032  Anesthesia Stop time: 9264  Handley Present on Transfer to floor:no  Order for Handley on Chart:no  Discharge Prescriptions with Chart:no    Information from the following report(s) SBAR, OR Summary, Intake/Output and MAR was reviewed with the receiving nurse. Opportunity for questions and clarification was provided. Is the patient on 02? YES       L/Min 3       Other     Is the patient on a monitor? NO    Is the nurse transporting with the patient? NO    Surgical Waiting Area notified of patient's transfer from PACU? YES      The following personal items collected during your admission accompanied patient upon transfer:   Dental Appliance: Dental Appliances: Lowers,Uppers  Vision:    Hearing Aid:    Jewelry: Jewelry: None  Clothing: Clothing: Footwear,Pants,Shirt,Socks,Undergarments (sent to pacu)  Other Valuables:  Other Valuables: None  Valuables sent to safe:      Clothes, shoes and dentures returned to patient in PACU

## 2022-02-11 NOTE — DISCHARGE INSTRUCTIONS
After Hospital Care Plan:  Discharge Instructions Cervical (Neck) Spine Surgery Dr. Dash Hamm    Patient Name: Martin Cruz    Date of procedure: 2/11/2022  Date of discharge: 2/11/2022    Procedure: Procedure(s):  C5-6, C6-7 ANTERIOR CERVICAL DISCECTOMY WITH FUSION (FAST TRACK)  PCP: Matteo Enciso MD    Follow up appointments   -follow up with Dr. Dash Hamm in 2 weeks. Call [phone] to make an appointment as soon as you get home from the hospital.    When to call your Orthopaedic Surgeon:  -Difficulty swallowing that is worse than when you left the hospital.  -Signs of infection-if your incision is red; continues to have drainage; drainage has a foul odor or if you have a persistent fever over 101 degrees for 24 hours  -nausea or vomiting, severe headache  -changes in sensation in your arms or legs (numbness, tingling, loss of color)  -increased weakness-greater than before your surgery  -severe pain or pain not relieved by medications  -Signs of a blood clot in your leg-calf pain, tenderness, redness, swelling of lower leg    When to call your Primary Care Physician:  -Concerns about medical conditions such as diabetes, high blood pressure, asthma, congestive heart failure  -Call if blood sugars are elevated, persistent headache or dizziness, coughing or congestion, constipation or diarrhea, burning with urination, abnormal heart rate    When to call 911 and go to the nearest emergency room:  -acute onset of chest pain, shortness of breath, difficulty breathing    Activity  - You are going home a well person, be as active as possible. Your only exercise should be walking. Start with short frequent walks and increase your walking distance each day.  -Limit the amount of time you sit to 20-30 minute intervals. Sitting for prolonged periods of time will be uncomfortable for you following surgery.   - Do NOT lift anything over 5 pounds  -From now on, even when lifting light weight, bend with your knees and not your back.  -Do NOT do any neck exercises until you have been instructed by your doctor  -When you are in bed, you may lay on your back or on either side. Do NOT lie on your stomach    Cervical Collar (Soft Collar)  -You are required to wear your cervical collar at all times; except when showering. You may remove the collar long enough to change the pads when needed and to change your dressing each day. -Do not bend or twist when your collar is off. It is best to have someone assist you when changing the pads and your dressing to prevent you from bending your neck. - Clean the pads on your neck collar every day by hand washing with a mild soap and water. Pat them dry with a towel and lay out to air dry. Do not use heat to dry the pads. Driving  -You may not drive or return to work until instructed by your physician. However, you may ride in the car for short periods of time. Incision Care  Your incision has been closed with absorbable sutures and steri-strips. This will assist with healing. Steri-strips are to remain on your incision for 2 weeks. A dry dressing (ABD and tape) will be placed over it and should be changed daily, for at least the first several days after your surgery. If you have no incisional drainage, you may leave the incision open to air if you wish, still leaving the steri-strips in place. Please make sure to wash your hands prior to touching your dressing. You may take brief showers but do not run the water directly onto the wound. After your shower, blot your incision dry with a soft towel and replace the dry dressing. Do not allow the tape to come in contact with the steri-strips. Do not rub or apply any lotions or ointments to your incision site. Do not soak or scrub your wound. Your steri-strips will be removed during your two week follow-up appointment.  If you experience drainage leaking from underneath the steri-strips or if it peels off before 2 weeks, please contact your orthopedic surgeons office. Showering  -You may shower in approximately 2 days after your surgery.    -Leave the dressing on during your shower. Do NOT allow the water to run directly onto your dressing. Once you get out of the shower, put on a dry dressing.  -Reminder- Make sure you put clean pads on your collar after your shower.    -Do not take a tub bath. Preventing blood clots  -You have been given T.E.D. stockings to wear. Continue to wear these for 7 days after your discharge. Put them on in the morning and take them off at night.    -They are used to increase your circulation and prevent blood clots from forming in your legs  -T. E.D. stockings can be machine washed, temperature not to exceed 160° F (71°C) and machine dried for 15 to 20 minutes, temperature not to exceed 250° F (121°C). Pain management  -Take pain medication as prescribed; decrease the amount you use as your pain lessens  -Do not wait until you are in extreme pain to take your medication.  -Avoid alcoholic beverages while taking pain medication    Pain Medication Safety  DO:  -Read the Medication Guide   -Take your medicine exactly as prescribed   -Store your medicine away from children and in a safe place   -Flush unused medicine down the toilet   -Call your healthcare provider for medical advice about side effects. You may report side effects to FDA at 1-456-FDA-2539.   -Please be aware that many medications contain Tylenol. We do not want you to over medicate so please read the information below as a guide. Do not take more than 4 Grams of Tylenol in a 24 hour period.   (There are 1000 milligrams in one Gram)                                                                                                                                                                                                    Percocet contains 325 mg of Tylenol per tablet (do not take more than 12 tablets in 24 hours)  Lortab contains 500 mg of Tylenol per tablet (do not take more than 8 tablets in 24 hours)  Norco contains 325 mg of Tylenol per tablet (do not take more than 12 tablets in 24 hours). DO NOT:  -Do not give your medicine to others   -Do not take medicine unless it was prescribed for you   -Do not stop taking your medicine without talking to your healthcare provider   -Do not break, chew, crush, dissolve, or inject your medicine. If you cannot  swallow your medicine whole, talk to your healthcare provider.  -Do not drink alcohol while taking this medicine  -Do not take anti-inflammatory medications or aspirin unless instructed by your     Physician. Diet  -resume usual diet; drink plenty of fluids; eat foods high in fiber  -It is important to have regular bowel movements. Pain medications may cause constipation. You may want to take a stool softener (such as Senokot-S or Colace) to prevent constipation. If constipation occurs, take a laxative (such as Dulcolax tablets, Milk of Magnesia, or a suppository). Laxatives should only be used if the above preventable measures have failed and you still have not had a bowel movement after three days.

## 2022-02-11 NOTE — PERIOP NOTES
FLOSEAL 5mL WAS GIVEN TO THE STERILE FIELD TO BE USED BY MD  REF: [id number]  LOT: [id number]  EXP: 10/07/2023

## 2022-02-11 NOTE — PROGRESS NOTES
Admission Medication Reconciliation:    Information obtained from:  Patient   RxQuery data available¹:  YES    Comments/Recommendations: Updated PTA meds/reviewed patient's allergies. 1)  Medication list reviewed with the patient     2)  Medication changes (since last review): Added  - None    Adjusted  - None    Removed  - None    3)  Confirmed that she takes symbicort only as needed     4)  Confirmed Lorazepam is 2x/day scheduled not prn      ¹RxQuery pharmacy benefit data reflects medications filled and processed through the patient's insurance, however   this data does NOT capture whether the medication was picked up or is currently being taken by the patient. Allergies:  Amlodipine, Ace inhibitors, and Codeine    Significant PMH/Disease States:   Past Medical History:   Diagnosis Date    Chronic obstructive pulmonary disease (HCC)     Chronic pain     BOTH ARMS AND NECK    Depression with anxiety     GERD (gastroesophageal reflux disease)     Hypertension     no longer on meds     Chief Complaint for this Admission:  No chief complaint on file. Prior to Admission Medications:   Prior to Admission Medications   Prescriptions Last Dose Informant Taking? Calcium-Cholecalciferol, D3, (CALCIUM 600 WITH VITAMIN D3) 600 mg(1,500mg) -400 unit cap 2022  No   Sig: Take 1 Capsule by mouth two (2) times a day. LORazepam (ATIVAN) 0.5 mg tablet 2/10/2022 at Unknown time  Yes   Sig: Take 0.5 mg by mouth two (2) times a day. budesonide-formoteroL (Symbicort) 160-4.5 mcg/actuation HFAA Unknown at Unknown time  No   Si Puffs as needed. dilTIAZem ER (CARDIZEM CD) 240 mg capsule 2/10/2022 at Unknown time  Yes   Sig: Take 1 Capsule by mouth nightly. fexofenadine-pseudoephedrine (Allegra-D 12 Hour)  mg Tb12 2022 at 0600  Yes   Sig: Take 1 Tablet by mouth daily. gabapentin (Neurontin) 300 mg capsule 2/10/2022 at Unknown time  Yes   Sig: Take 1 Capsule by mouth nightly.  Max Daily Amount: 300 mg.   irbesartan (AVAPRO) 300 mg tablet 2/10/2022 at 0800  Yes   Sig: Take 300 mg by mouth daily. meloxicam (MOBIC) 15 mg tablet 2/4/2022  No   Sig: Take 15 mg by mouth nightly. montelukast (SINGULAIR) 10 mg tablet 2/11/2022 at 0600  Yes   Sig: Take 10 mg by mouth daily. multivitamins-minerals-lutein (CENTRUM SILVER) Tab 2/4/2022  No   Sig: Take  by mouth. omeprazole (PRILOSEC) 20 mg capsule 2/11/2022 at 0600  Yes   Sig: Take 20 mg by mouth daily. rosuvastatin (CRESTOR) 10 mg tablet 2/10/2022 at Unknown time  Yes   Sig: Take 1 Tablet by mouth nightly. tiotropium (Spiriva with HandiHaler) 18 mcg inhalation capsule 2/11/2022 at 0600  Yes   Sig: Take 1 Capsule by mouth daily. zolpidem (AMBIEN) 10 mg tablet 2/10/2022 at Unknown time  Yes   Sig: Take 10 mg by mouth nightly as needed for Sleep. Facility-Administered Medications: None     Please contact the main inpatient pharmacy with any questions or concerns at [phone] and we will direct you to the clinical pharmacist covering this patient's care while in-house.    Goldy Melgar PHARMD

## 2022-02-11 NOTE — PROGRESS NOTES
Occupational Therapy  02/11/22    Orders acknowledged, chart reviewed. Patient is s/p C5-6, C6-7 ANTERIOR CERVICAL DISCECTOMY WITH FUSION and noted to not be appropriate for POD #0 mobilization. Will continue to follow and participate in evaluation tomorrow.     Thank you,   Ashlyn Parrish, OTD, OTR/L

## 2022-02-11 NOTE — PROGRESS NOTES
Pt doing well on POD#0 after ACDF surgery. Complaints of sore throat and wheezing. No audible wheezing at time of my visit. Swallowing secretions without issue. Oxygenating fully. VSS. Pain well-controlled. Patient resting comfortably in bed in NAD. Clear liquid diet; If tolerating advance to soft foods. Pain control. D/C to home with family support tomorrow AM if cleared by PT/OT and medically stable. Patient and  in agreement with plan. All questions answered. Discussed plan with Dr. Morales Parents. Pt should follow up in his office in 2 week.

## 2022-02-11 NOTE — PROGRESS NOTES
PT orders acknowledged and chart reviewed. Pt is s/p C5-6, C6-7 ANTERIOR CERVICAL DISCECTOMY WITH FUSION and noted to not be appropriate for POD0 mobilization. Will continue to follow and participate in evaluation tomorrow.      Antoni Mosher, DPT, PT

## 2022-02-11 NOTE — BRIEF OP NOTE
Brief Postoperative Note    Patient: Andrew Sherwood  YOB: 1955  MRN: [id number]    Date of Procedure: 2/11/2022     Pre-Op Diagnosis: CERVICAL DISC DISEASE, NECK PAIN, CERVICAL STENOSIS OF SPINAL CANAL    Post-Op Diagnosis: Same as preoperative diagnosis. Procedure(s):  C5-6, C6-7 ANTERIOR CERVICAL DISCECTOMY WITH FUSION (FAST TRACK)    Surgeon(s):  Emma Yanes MD    Surgical Assistant: Physician Assistant: JUAN Lewis    Anesthesia: General     Estimated Blood Loss (mL): Minimal    Complications: None    Specimens: * No specimens in log *     Implants:   Implant Name Type Inv. Item Serial No.  Lot No. LRB No. Used Action   GRAFT HUM TISS 3X4 CM WND COVERING AMNIO MEMBRN VERSASHIELD - [id number]  GRAFT HUM TISS 3X4 CM WND COVERING AMNIO MEMBRN VERSASHIELD [id number] [employer] NA N/A 1 Implanted   GRAFT BNE SUB SM CANC FRZN MORSELIZED W/ VIABLE CELL - [id number]  GRAFT BNE SUB SM CANC FRZN MORSELIZED W/ VIABLE CELL [id number] [employer]WD NA N/A 1 Implanted   PUTTY BNE GRFT 1 CC W/ SYR I FACTOR - SNA  PUTTY BNE GRFT 1 CC W/ SYR I FACTOR NA [employer]WD [id number] N/A 1 Implanted   CAGE SPNL [id number] CERV PEEK INTBDY FUS MAGNOLIA PTC - SNA  CAGE SPNL [id number] CERV PEEK INTBDY FUS MAGNOLIA PTC NA [employer]WD [id number] N/A 1 Implanted   CAGE SPNL [id number] CERV PEEK INTBDY FUS MAGNOLIA PTC - SNA  CAGE SPNL [id number] CERV PEEK INTBDY FUS MAGNOLIA PTC NA [employer]WD [id number] N/A 1 Implanted   PLATE SPNL [id number] LEV 2 ANT CERV TI ZEVO - SNA  PLATE SPNL [id number] LEV 2 ANT CERV TI ZEVO NA [employer]WD NA N/A 1 Implanted   SCREW SPNL L15MM DIA3. 5MM ANT CERV TI SELF DRL DELFINA ANG - SNA  SCREW SPNL L15MM DIA3. 5MM ANT CERV TI SELF DRL DELFINA ANG NA [employer]WD NA N/A 6 Implanted       Drains: * No LDAs found *    Findings: cervical DDD    Electronically Signed by JUAN Lindsey on 2/11/2022 at 12:03 PM

## 2022-02-11 NOTE — ANESTHESIA POSTPROCEDURE EVALUATION
Procedure(s):  C5-6, C6-7 ANTERIOR CERVICAL DISCECTOMY WITH FUSION (FAST TRACK). general    Anesthesia Post Evaluation        Patient location during evaluation: PACU  Patient participation: complete - patient participated  Level of consciousness: awake and alert  Pain management: adequate  Airway patency: patent  Anesthetic complications: no  Cardiovascular status: acceptable  Respiratory status: acceptable  Hydration status: acceptable  Comments: I have seen and evaluated the patient and is ready for discharge. Janice Karimi MD    Post anesthesia nausea and vomiting:  none      INITIAL Post-op Vital signs:   Vitals Value Taken Time   BP 95/74 02/11/22 1226   Temp 36.8 °C (98.2 °F) 02/11/22 1219   Pulse 85 02/11/22 1226   Resp 11 02/11/22 1226   SpO2 88 % 02/11/22 1226   Vitals shown include unvalidated device data.

## 2022-02-11 NOTE — ANESTHESIA PREPROCEDURE EVALUATION
Relevant Problems   No relevant active problems       Anesthetic History   No history of anesthetic complications            Review of Systems / Medical History  Patient summary reviewed, nursing notes reviewed and pertinent labs reviewed    Pulmonary  Within defined limits  COPD               Neuro/Psych   Within defined limits           Cardiovascular  Within defined limits  Hypertension                   GI/Hepatic/Renal  Within defined limits   GERD           Endo/Other  Within defined limits           Other Findings              Physical Exam    Airway  Mallampati: II  TM Distance: > 6 cm  Neck ROM: normal range of motion   Mouth opening: Normal     Cardiovascular  Regular rate and rhythm,  S1 and S2 normal,  no murmur, click, rub, or gallop             Dental  No notable dental hx       Pulmonary  Breath sounds clear to auscultation               Abdominal  GI exam deferred       Other Findings            Anesthetic Plan    ASA: 3  Anesthesia type: general          Induction: Intravenous  Anesthetic plan and risks discussed with: Patient

## 2022-02-12 LAB
HCT VFR BLD AUTO: 30.7 % (ref 35–47)
HGB BLD-MCNC: 9.8 G/DL (ref 11.5–16)

## 2022-02-12 PROCEDURE — 74011250637 HC RX REV CODE- 250/637: Performed by: STUDENT IN AN ORGANIZED HEALTH CARE EDUCATION/TRAINING PROGRAM

## 2022-02-12 PROCEDURE — 36415 COLL VENOUS BLD VENIPUNCTURE: CPT

## 2022-02-12 PROCEDURE — 94640 AIRWAY INHALATION TREATMENT: CPT

## 2022-02-12 PROCEDURE — 97161 PT EVAL LOW COMPLEX 20 MIN: CPT

## 2022-02-12 PROCEDURE — 97116 GAIT TRAINING THERAPY: CPT

## 2022-02-12 PROCEDURE — 94664 DEMO&/EVAL PT USE INHALER: CPT

## 2022-02-12 PROCEDURE — 97165 OT EVAL LOW COMPLEX 30 MIN: CPT

## 2022-02-12 PROCEDURE — 74011000250 HC RX REV CODE- 250: Performed by: STUDENT IN AN ORGANIZED HEALTH CARE EDUCATION/TRAINING PROGRAM

## 2022-02-12 PROCEDURE — 94760 N-INVAS EAR/PLS OXIMETRY 1: CPT

## 2022-02-12 PROCEDURE — 77010033678 HC OXYGEN DAILY

## 2022-02-12 PROCEDURE — 85018 HEMOGLOBIN: CPT

## 2022-02-12 PROCEDURE — 97535 SELF CARE MNGMENT TRAINING: CPT

## 2022-02-12 PROCEDURE — 74011250636 HC RX REV CODE- 250/636: Performed by: STUDENT IN AN ORGANIZED HEALTH CARE EDUCATION/TRAINING PROGRAM

## 2022-02-12 RX ADMIN — OXYCODONE 5 MG: 5 TABLET ORAL at 07:59

## 2022-02-12 RX ADMIN — ROSUVASTATIN CALCIUM 10 MG: 10 TABLET, FILM COATED ORAL at 23:31

## 2022-02-12 RX ADMIN — CETIRIZINE HYDROCHLORIDE 10 MG: 10 TABLET, FILM COATED ORAL at 08:35

## 2022-02-12 RX ADMIN — PANTOPRAZOLE SODIUM 40 MG: 40 TABLET, DELAYED RELEASE ORAL at 07:59

## 2022-02-12 RX ADMIN — Medication 1 LOZENGE: at 19:00

## 2022-02-12 RX ADMIN — Medication 1 TABLET: at 08:35

## 2022-02-12 RX ADMIN — SENNOSIDES AND DOCUSATE SODIUM 1 TABLET: 50; 8.6 TABLET ORAL at 08:35

## 2022-02-12 RX ADMIN — Medication 1 TABLET: at 19:00

## 2022-02-12 RX ADMIN — ACETAMINOPHEN 1000 MG: 500 TABLET ORAL at 11:32

## 2022-02-12 RX ADMIN — LOSARTAN POTASSIUM 100 MG: 50 TABLET, FILM COATED ORAL at 08:35

## 2022-02-12 RX ADMIN — SENNOSIDES AND DOCUSATE SODIUM 1 TABLET: 50; 8.6 TABLET ORAL at 19:00

## 2022-02-12 RX ADMIN — OXYCODONE 5 MG: 5 TABLET ORAL at 23:37

## 2022-02-12 RX ADMIN — OXYCODONE 10 MG: 5 TABLET ORAL at 11:31

## 2022-02-12 RX ADMIN — ZOLPIDEM TARTRATE 10 MG: 5 TABLET ORAL at 23:37

## 2022-02-12 RX ADMIN — GABAPENTIN 300 MG: 300 CAPSULE ORAL at 23:31

## 2022-02-12 RX ADMIN — SODIUM CHLORIDE 125 ML/HR: 9 INJECTION, SOLUTION INTRAVENOUS at 03:24

## 2022-02-12 RX ADMIN — ACETAMINOPHEN 1000 MG: 500 TABLET ORAL at 19:00

## 2022-02-12 RX ADMIN — LORAZEPAM 0.5 MG: 0.5 TABLET ORAL at 23:31

## 2022-02-12 RX ADMIN — ACETAMINOPHEN 1000 MG: 500 TABLET ORAL at 23:31

## 2022-02-12 RX ADMIN — SODIUM CHLORIDE, PRESERVATIVE FREE 10 ML: 5 INJECTION INTRAVENOUS at 23:38

## 2022-02-12 RX ADMIN — IPRATROPIUM BROMIDE 0.5 MG: 0.5 SOLUTION RESPIRATORY (INHALATION) at 08:13

## 2022-02-12 RX ADMIN — WATER 2 G: 1 INJECTION INTRAMUSCULAR; INTRAVENOUS; SUBCUTANEOUS at 03:17

## 2022-02-12 RX ADMIN — POLYETHYLENE GLYCOL 3350 17 G: 17 POWDER, FOR SOLUTION ORAL at 08:35

## 2022-02-12 RX ADMIN — OXYCODONE 5 MG: 5 TABLET ORAL at 19:00

## 2022-02-12 NOTE — PROGRESS NOTES
Physical Therapy    Orders received, chart reviewed and patient evaluated by physical therapy. Pt mobilized with SBA/ CGA due to onset of dizziness with gait/ stair training requiring seated rest to recover. Vitals:    02/12/22 0918 02/12/22 0924 02/12/22 0939 02/12/22 0946   BP: 125/78 133/81 130/70 115/77   BP 1 Location: Right upper arm Right upper arm Right upper arm Right upper arm   BP Patient Position: Supine; At rest Sitting  Comment: EOB Sitting  Comment: after amb/ stairs, c/o dizziness/ wkness Sitting  Comment: in chair   Pulse: 97 96 (!) 118 95   Temp:       Resp:       Height:       Weight:       SpO2:  100%       From mobility standpoint, pt not safe for d/c at this time. Pending progression with skilled acute physical therapy, recommend:  Physical therapy at least 2 days/week in the home     Recommend with nursing OOB to chair 3x/day and walking daily with supervision assist . Thank you for completing as able in order to maintain patient strength, endurance and independence. Full evaluation to follow.      Jeremy Cornell, PT, MPT

## 2022-02-12 NOTE — DISCHARGE SUMMARY
Discharge Summary     Patient ID:  Kyle Allen  [id number]  30 y.o.  1955    Admit Date: 2/11/2022    Discharge Date: 2/12/2022    Admission Diagnoses: Cervical stenosis of spine [M48.02]    Surgeon: Cherelle Moss DO    Last Procedure: Procedure(s):  C5-6, C6-7 ANTERIOR CERVICAL DISCECTOMY WITH FUSION (FAST TRACK)      Hospital Course: Normal hospital course for this procedure. Significant Diagnostic Studies: None    Discharge Diagnosis: Active Problems:    Cervical stenosis of spine (2/11/2022)         Condition on Discharge: good    Disposition:Home    Followup Care:  Discharge Condition: good  Activity as tolerated  Regular Diet  Keep wound clean and dry    Follow-up Information     Follow up With Specialties Details Why Daily Joel MD Family [id number] [place] [id number] [phone]            PR med list:   Current Discharge Medication List      START taking these medications    Details   oxyCODONE IR (ROXICODONE) 5 mg immediate release tablet Take 1-2 Tablets by mouth every four to six (4-6) hours as needed for Pain for up to 7 days. Max Daily Amount: 60 mg.  Qty: 60 Tablet, Refills: 0  Start date: 2/11/2022, End date: 2/18/2022    Associated Diagnoses: S/P cervical spinal fusion      naloxone (Narcan) 4 mg/actuation nasal spray Use 1 spray intranasally, then discard. Repeat with new spray every 2 min as needed for opioid overdose symptoms, alternating nostrils. Qty: 1 Each, Refills: 0  Start date: 2/11/2022         CONTINUE these medications which have NOT CHANGED    Details   omeprazole (PRILOSEC) 20 mg capsule Take 20 mg by mouth daily. irbesartan (AVAPRO) 300 mg tablet Take 300 mg by mouth daily. montelukast (SINGULAIR) 10 mg tablet Take 10 mg by mouth daily. fexofenadine-pseudoephedrine (Allegra-D 12 Hour)  mg Tb12 Take 1 Tablet by mouth daily.       tiotropium (Spiriva with HandiHaler) 18 mcg inhalation capsule Take 1 Capsule by mouth daily. rosuvastatin (CRESTOR) 10 mg tablet Take 1 Tablet by mouth nightly. LORazepam (ATIVAN) 0.5 mg tablet Take 0.5 mg by mouth two (2) times a day. dilTIAZem ER (CARDIZEM CD) 240 mg capsule Take 1 Capsule by mouth nightly.      gabapentin (Neurontin) 300 mg capsule Take 1 Capsule by mouth nightly. Max Daily Amount: 300 mg. Qty: 30 Capsule, Refills: 0    Associated Diagnoses: Neck pain; Cervical disc disease; Cervical stenosis of spinal canal      zolpidem (AMBIEN) 10 mg tablet Take 10 mg by mouth nightly as needed for Sleep. budesonide-formoteroL (Symbicort) 160-4.5 mcg/actuation HFAA 2 Puffs as needed. multivitamins-minerals-lutein (CENTRUM SILVER) Tab Take  by mouth. Calcium-Cholecalciferol, D3, (CALCIUM 600 WITH VITAMIN D3) 600 mg(1,500mg) -400 unit cap Take 1 Capsule by mouth two (2) times a day. STOP taking these medications       meloxicam (MOBIC) 15 mg tablet Comments:   Reason for Stopping:                  Home Going Instructions:   Patient to follow up in one - two weeks. Notify my office if develop fever, chills, redness, unbearble pain or temp.>102. Patient to follow discharge instructions. Patient to call [phone] to set up follow up appointment.         Signed:  Farhad Bermeo DO  2/12/2022  8:37 AM

## 2022-02-12 NOTE — PROGRESS NOTES
Problem: Mobility Impaired (Adult and Pediatric)  Goal: *Acute Goals and Plan of Care (Insert Text)  Description: FUNCTIONAL STATUS PRIOR TO ADMISSION: Pt reports indep LOF with mobility and ADLs. Notes difficulty with stair negotiation due to WHEELER/ COPD    HOME SUPPORT PRIOR TO ADMISSION: The patient lived with  but did not require assist.    Physical Therapy Goals  Initiated 2/12/2022  1. Patient will move from supine to sit and sit to supine  in bed with modified independence within 7 day(s). 2.  Patient will transfer from bed to chair and chair to bed with modified independence using the least restrictive device within 7 day(s). 3.  Patient will perform sit to stand with modified independence within 7 day(s). 4.  Patient will ambulate with supervision/set-up for 150 feet with the least restrictive device within 7 day(s). 5.  Patient will ascend/descend at least 4 stairs with bilat handrail(s) with supervision/set-up within 7 day(s). Outcome: Not Met   PHYSICAL THERAPY EVALUATION  Patient: Yohan Pitts (86 y.o. female)  Date: 2/12/2022  Primary Diagnosis: Cervical stenosis of spine [M48.02]  Procedure(s) (LRB):  C5-6, C6-7 ANTERIOR CERVICAL DISCECTOMY WITH FUSION (FAST TRACK) (N/A) 1 Day Post-Op   Precautions: fall, spine, soft collar       ASSESSMENT  Based on the objective data described below, the patient presents with anxious affect, decreased insight, incisional pain, symptomatic BP decrease with upright mobility, impaired balance, increased risk for fall. Pt reported up to restroom with nursing staff earlier today, c/o dizziness with OOB. Pt educated on spinal precautions; required intermittent cues to maintain during session. Orthostatic check completed with negative findings, but pt c/o ongoing mild dizziness. Gait training performed on unit without device, CGA for balance. Offered pt seated rest before initiating stair training, pt declined.  Stair training initiated up 4 steps with bilat HR min A. Pt reported dizziness at top of stairs; descended two steps with min A then required seated rest for safety; /70, p118. Pt then descended final two steps with min A and sat in w/c for return to room. Transferred w/c to chair stand pivot with CGA; /77 in chair. Discussed paced activity and use of rest breaks as needed to manage WHEELER related to COPD and fatigue. Pt with SpO2 high 90s throughout session on RA. Instructed pt to call for assist back to bed in 30 min. RN informed of pt response to activity this session. Pt remains below functional baseline with increased fall risk at this time. Unsafe to d/c home. Will benefit from con't PT for mobility progression. Recommend follow up [facility] PT at d/c. Current Level of Function Impacting Discharge (mobility/balance): bed mob SBA, transfer SBA/ CGA, amb CGA    Functional Outcome Measure: The patient scored 22/28 on the Tinetti outcome measure which is indicative of moderate risk for fall. Other factors to consider for discharge: baseline WHEELER with stair negotiation due to COPD     Patient will benefit from skilled therapy intervention to address the above noted impairments. PLAN :  Recommendations and Planned Interventions: bed mobility training, transfer training, gait training, therapeutic exercises, patient and family training/education, and therapeutic activities      Frequency/Duration: Patient will be followed by physical therapy:  BID to address goals.     Recommendation for discharge: (in order for the patient to meet his/her long term goals)  Physical therapy at least 2 days/week in the home     This discharge recommendation:  Has not yet been discussed the attending provider and/or case management    IF patient discharges home will need the following DME: to be determined (TBD)         SUBJECTIVE:   Patient talkative and tangential, difficult to redirect    OBJECTIVE DATA SUMMARY:   HISTORY:    Past Medical History:   Diagnosis Date    Chronic obstructive pulmonary disease (HCC)     Chronic pain     BOTH ARMS AND NECK    Depression with anxiety     GERD (gastroesophageal reflux disease)     Hypertension     no longer on meds     Past Surgical History:   Procedure Laterality Date    HX CHOLECYSTECTOMY      HX COLONOSCOPY      HX ENDOSCOPY      HX ORTHOPAEDIC  1999    LOWER BACK - HERNIATED DISC    HX ORTHOPAEDIC      RIGHT WRIST    HX ROTATOR CUFF REPAIR Left     HX TONSILLECTOMY  CHILDHOOD       Personal factors and/or comorbidities impacting plan of care: COPD, chronic pain, anxiety    Home Situation  Home Environment: Private residence  # Steps to Enter: 3  Rails to Enter: Yes  Hand Rails : Bilateral  One/Two Story Residence: Two story  Living Alone: No  Support Systems: Spouse/Significant Other  Current DME Used/Available at Home: Grab bars,Shower chair,Raised toilet seat  Tub or Shower Type: Shower    EXAMINATION/PRESENTATION/DECISION MAKING:   Critical Behavior:  Neurologic State: Alert  Orientation Level: Oriented X4  Cognition: Appropriate for age attention/concentration,Follows commands  Safety/Judgement: Awareness of environment  Range Of Motion:  AROM: Generally decreased, functional                       Strength:    Strength: Generally decreased, functional                    Tone & Sensation:   Tone: Normal              Sensation: Intact               Coordination:  Coordination: Generally decreased, functional  Vision:      Functional Mobility:  Bed Mobility:     Supine to Sit: Stand-by assistance        Transfers:  Sit to Stand: Contact guard assistance  Stand to Sit: Contact guard assistance  Stand Pivot Transfers: Stand-by assistance                    Balance:   Sitting: Intact  Standing: Impaired  Standing - Static: Good  Standing - Dynamic : Fair  Ambulation/Gait Training:  Distance (ft): 70 Feet (ft)  Assistive Device: Gait belt  Ambulation - Level of Assistance: Contact guard assistance Gait Abnormalities: Decreased step clearance; Path deviations;Trunk sway increased        Base of Support: Narrowed     Speed/Natalie: Pace decreased (<100 feet/min)  Step Length: Left shortened;Right shortened        Stairs:  Number of Stairs Trained: 4 (required seated rest at top 2*dizziness)  Stairs - Level of Assistance: Minimum assistance   Rail Use: Both        Functional Measure:  Tinetti test:    Sitting Balance: 1  Arises: 1  Attempts to Rise: 2  Immediate Standing Balance: 2  Standing Balance: 2  Nudged: 1  Eyes Closed: 1  Turn 360 Degrees - Continuous/Discontinuous: 1  Turn 360 Degrees - Steady/Unsteady: 1  Sitting Down: 1  Balance Score: 13 Balance total score  Indication of Gait: 1  R Step Length/Height: 1  L Step Length/Height: 1  R Foot Clearance: 1  L Foot Clearance: 1  Step Symmetry: 1  Step Continuity: 1  Path: 1  Trunk: 0  Walking Time: 1  Gait Score: 9 Gait total score  Total Score: 22/28 Overall total score         Tinetti Tool Score Risk of Falls  <19 = High Fall Risk  19-24 = Moderate Fall Risk  25-28 = Low Fall Risk  Tinetti ME. Performance-Oriented Assessment of Mobility Problems in Elderly Patients. Sorto 66; [id number].  (Scoring Description: PT Bulletin Feb. 10, 1993)    Older adults: Damaris Fowler et al, 2009; n = 1000 [facility] elderly evaluated with ABC, HAILEY, ADL, and IADL)  · Mean HAILEY score for males aged 69-68 years = 26.21(3.40)  · Mean HAILEY score for females age 69-68 years = 25.16(4.30)  · Mean HAILEY score for males over 80 years = 23.29(6.02)  · Mean HAILEY score for females over 80 years = 17.20(8.32)           Physical Therapy Evaluation Charge Determination   History Examination Presentation Decision-Making   HIGH Complexity :3+ comorbidities / personal factors will impact the outcome/ POC  MEDIUM Complexity : 3 Standardized tests and measures addressing body structure, function, activity limitation and / or participation in recreation  MEDIUM Complexity : Evolving with changing characteristics  LOW Complexity : FOTO score of       Based on the above components, the patient evaluation is determined to be of the following complexity level: LOW     Pain Rating:  Pt notes incisional pain in neck with bed mob    Activity Tolerance:   Fair, requires rest breaks, and BP decrease with gait/ stair training requiring seated rest to recover  Vitals:    02/12/22 0918 02/12/22 0924 02/12/22 0939 02/12/22 0946   BP: 125/78 133/81 130/70 115/77   BP 1 Location: Right upper arm Right upper arm Right upper arm Right upper arm   BP Patient Position: Supine; At rest Sitting  Comment: EOB Sitting  Comment: after amb/ stairs, c/o dizziness/ wkness Sitting  Comment: in chair   Pulse: 97 96 (!) 118 95   Temp:       Resp:       Height:       Weight:       SpO2:  100%          After treatment patient left in no apparent distress:   Sitting in chair and Call bell within reach    COMMUNICATION/EDUCATION:   The patients plan of care was discussed with: Registered nurse. Fall prevention education was provided and the patient/caregiver indicated understanding., Patient/family have participated as able in goal setting and plan of care. , and Patient/family agree to work toward stated goals and plan of care.     Thank you for this referral.  Jacbo Chin, PT   Time Calculation: 39 mins

## 2022-02-12 NOTE — PROGRESS NOTES
Bedside and Verbal shift change report given to Narda (oncoming nurse) by Shivani Betancur (offgoing nurse). Report included the following information SBAR, Kardex, Intake/Output, MAR and Recent Results.

## 2022-02-12 NOTE — PROGRESS NOTES
Transition of Care    Reason for Admission:  C5-6, C6-7, Anterior Cervical Discectomy                     RUR Score:    N/ A                 Plan for utilizing home health: Yes         PCP: First and Last name:  Alba Barron MD     Name of Practice:    Are you a current patient: Yes/No: Yes   Approximate date of last visit: one month age   Can you participate in a virtual visit with your PCP: Yes                    Current Advanced Directive/Advance Care Plan: Full Code      Healthcare Decision Maker:   Click here to complete [employer] including selection of the Healthcare Decision Maker Relationship (ie \"Primary\")                             Transition of Care Plan:  Ms. Modesta Lewis was seen. She was offered choice for home health. She chose to use [facility]. A referral was sent through [employer]. Will continue to follow for discharge planning.   Signed By: Waldo Hughes LCSW     February 12, 2022

## 2022-02-12 NOTE — PROGRESS NOTES
Problem: Falls - Risk of  Goal: *Absence of Falls  Description: Document Paula Sageer Fall Risk and appropriate interventions in the flowsheet.   Outcome: Progressing Towards Goal  Note: Fall Risk Interventions:  Mobility Interventions: Patient to call before getting OOB,Utilize walker, cane, or other assistive device         Medication Interventions: Patient to call before getting OOB    Elimination Interventions: Call light in reach,Patient to call for help with toileting needs

## 2022-02-12 NOTE — PROGRESS NOTES
Patient assessed for readiness to ambulate. Vital Signs  Level of Consciousness: Alert (0)  Temp: 97.8 °F (36.6 °C)  Temp Source: Oral  Pulse (Heart Rate): 96  Heart Rate Source: Monitor  Resp Rate: 16  BP: 133/81  MAP (Calculated): 98  BP 1 Location: Right upper arm  BP 1 Method: Automatic  BP Patient Position: Sitting (EOB)  MEWS Score: 1. Patient ambulated with assistance of 1 nurses. Patient ambulated {WITH walker. Patient walked to Bedside commode. Patient returned safely to bed.

## 2022-02-12 NOTE — OP NOTES
[address] [facility]  OPERATIVE REPORT    Name:  Luis Parker  MR#:  [id number]  :  1955  ACCOUNT #:  [de-identified]  DATE OF SERVICE:  2022    PREOPERATIVE DIAGNOSES:  Cervical stenosis, C5-6, C6-7; cervical spondylosis C5-6, C6-7. POSTOPERATIVE DIAGNOSES:  Cervical stenosis, C5-6, C6-7; cervical spondylosis C5-6, C6-7. PROCEDURE PERFORMED:  Anterior cervical diskectomy, C5-6, C6-7; anterior interbody fusion C5-6, C6-7, anterior plate fixation, C5, C6, and C7. SURGEON:  Herminia Morris MD    ASSISTANT:  Clau Carrion PA-C    ANESTHESIA:  General.    COMPLICATIONS:  None. SPECIMENS REMOVED:  None. IMPLANTS:  Medtronic Zevo and Medtronic TCP. ESTIMATED BLOOD LOSS:  Minimal.    INDICATIONS:  This is a pleasant 61-year-old female with neck pain and bilateral arm pain, right greater than left. Cervical spondylosis C5-C6 and C6-7 from disk space collapse. Foraminal stenosis bilaterally. Moderate central stenosis. She has failed conservative treatment, understood the risks and benefits and elected to proceed. PROCEDURE:  The patient was identified, brought to the operative suite, underwent general anesthesia without difficulty. Preoperative neuromonitoring was placed, baselines obtained, these remained stable throughout the surgical procedure. Perioperative antibiotics were also given to the patient. The patient was placed in the supine position with the neck in neutral position with 10 pounds across the neck for traction. Neck was prepped and draped sterilely. A time-out was performed. We made a transverse incision approximately at the level of the cricoid cartilage. Dissection was carried down to the platysma. We split this longitudinally and then bluntly dissected down medially to sternocleidomastoid down to the deep cervical fascia.   We dissected off the deep cervical fascia and then took an x-ray with lateral fluoroscopy to confirm the C5-6 and C6-7 levels that have been obtained. We then elevated the longus coli and with a deep radiolucent retractor starting at the C6-7 level we did remove anterior osteophytes with a rongeur followed by an annulotomy followed by removal of the remainder of the disk material with pituitary rongeurs, curved curettes, and harinder. Sequential distraction of the disk space back to normal height was done as well. We used Midas surendra to take down the posterior vertebral osteophytes with Loupe magnification, and then this allowed access to posterior longitudinal ligament. This was elevated and this was also resected for central canal decompression. We also undercut the posterior vertebral osteophytes of the posterior vertebral bodies for central canal decompression. Hemostasis with bipolar cautery. We then undercut the uncinate processes bilaterally with bilateral foraminal decompression with #1 and #2 Kerrison as well and a blunt nerve hook passed into the foramen without any further neural impingement. Neuromonitoring was stable. We then did a similar decompression at the C5-6 level using the same standard technique of the annulotomy followed by diskectomy, pituitary rongeurs, curved curettes, posterior vertebral osteophytes taken down. Midas bur used to taking down of posterior vertebral osteophytes. We then elevated the posterior longitudinal ligament and resected that as well. Bilateral foraminotomies were performed at the level below. After satisfactory decompression of both levels, we then reconstructed the anterior column using trials for the "[employer] PCP interbody grafting system. We used a 16 x 14 mm footprint at a 7 mm height at each level which provided good anterior column reconstruction as well as restoration of normal foraminal height. Endplates were rasped to lightly bleeding bone. Each graft was packed with a combination of Megan and i-FACTOR and impacted in place with countersinking.   Small nerve hook could be passed behind the graft without any impingement. Weight was removed off the head. We then contoured a Medtronic Zevo 35 mm length in the anterior cervical spine. Temporary fixation with a threaded pin followed by 3.5 x 15 mm variable angle screws at each level. Good fixation was obtained. Locking mechanism was engaged. Wounds were thoroughly irrigated. FloSeal for hemostasis. VersaShield for anti-adhesion. 3-0 Monocryl on the skin. Steri-Strips placed. Soft collar placed. The patient returned to the PACU in stable condition. The physician assistant was present during the entire operative procedure and assisted in all critical elements of the surgery. No surgical assist or resident was available.      Ramírez Shepard MD      [id number]  D:  02/11/2022 12:26  T:  02/11/2022 21:49  JOB #:  [id number]

## 2022-02-12 NOTE — PROGRESS NOTES
Occupational Therapy  02/12/22    Orders received, chart reviewed and patient evaluated by occupational therapy. Pending progression with skilled acute occupational therapy, recommend:  No skilled occupational therapy/ follow up rehabilitation needs identified at this time. Of note, patient with dizziness throughout session requiring frequent seated rest breaks, SBP dropping 15 points with brief activity, tachycardic () with activity, decreased insight into deficits & safety. No further OT needs but would benefit from at least one more PT session to maximize safety & activity tolerance prior to d/c home. Recommend with nursing ADLs with supervision/setup, OOB to chair 3x/day and toileting via functional mobility to and from bathroom with 1 assist. Thank you for completing as able in order to maintain patient strength, endurance and independence. Full evaluation to follow.      Thank you,   AMANDA Oconnell, OTR/L

## 2022-02-12 NOTE — PROGRESS NOTES
POD 1 Day Post-Op    Procedure:  Procedure(s):  C5-6, C6-7 ANTERIOR CERVICAL DISCECTOMY WITH FUSION (FAST TRACK)    Subjective:   Pain is well controlled. Mild neck pain, no radicular symptoms. Hoping to d/c today    Objective:     Blood pressure 122/62, pulse 87, temperature 97.8 °F (36.6 °C), resp. rate 16, height 5' 6\" (1.676 m), weight 190 lb (86.2 kg), SpO2 97 %. Temp (24hrs), Av.9 °F (36.6 °C), Min:97.5 °F (36.4 °C), Max:98.4 °F (36.9 °C)      Physical Exam:  Soft cervical collar in place. Sensation is intact to light touch in the arms/hands. Brisk capillary refill.      Labs:   Lab Results   Component Value Date/Time    HGB 10.4 (L) 2022 03:14 PM         Assessment:     Active Problems:    Cervical stenosis of spine (2022)    s/p C5-7 ACDF     Plan/Recommendations/Medical Decision Making:     Continue physical therapy  Up ad ravi  ALFONSOs/MING hose  D/c home today

## 2022-02-12 NOTE — PROGRESS NOTES
OCCUPATIONAL THERAPY EVALUATION/DISCHARGE  Patient: Tylor Loja (62 y.o. female)  Date: 2/12/2022  Primary Diagnosis: Cervical stenosis of spine [M48.02]  Procedure(s) (LRB):  C5-6, C6-7 ANTERIOR CERVICAL DISCECTOMY WITH FUSION (FAST TRACK) (N/A) 1 Day Post-Op   Precautions:  Fall (cervical)    ASSESSMENT  Based on the objective data described below, the patient presents with decreased balance, activity tolerance, & safety awareness, however functional s/p C5-7 ACDF POD #1. At baseline, she is IND-Mod I for ADLs, requiring rest breaks d/t baseline COPD, lives with her spouse. She now presents just slightly below her baseline,mobilizng and completing ADLs well with SBA-CGA, fair carryover of cervical spine precautions. Up to Min A require for adjusting soft C-collar,discussed adaptive ADL techniques with patient acknowledging understanding, handouts provided for reinforcement. Of note, multiple rest breaks and lightheadedness reported with activity, would benefit from further monitoring and PT to maximize safety prior to d/c home. No further acute OT needs. Current Level of Function (ADLs/self-care): setup-Min A for ADLs, SBA-CGA for mobility with no AD    Functional Outcome Measure: The patient scored 85/100 on the Barthel Index outcome measure which is indicative of being independent in basic self care. Other factors to consider for discharge:      PLAN :  Recommendation for discharge: (in order for the patient to meet his/her long term goals)  No skilled occupational therapy/ follow up rehabilitation needs identified at this time. This discharge recommendation:  A follow-up discussion with the attending provider and/or case management is planned    IF patient discharges home will need the following DME: patient owns DME required for discharge       SUBJECTIVE:   Patient stated I do feel lightheaded but it gets better. I also have vertigo but that hasn't come back in years.     OBJECTIVE DATA SUMMARY:   HISTORY:   Past Medical History:   Diagnosis Date    Chronic obstructive pulmonary disease ([address]     Chronic pain     BOTH ARMS AND NECK    Depression with anxiety     GERD (gastroesophageal reflux disease)     Hypertension     no longer on meds     Past Surgical History:   Procedure Laterality Date    HX CHOLECYSTECTOMY      HX COLONOSCOPY      HX ENDOSCOPY      HX ORTHOPAEDIC  1999    LOWER BACK - HERNIATED DISC    HX ORTHOPAEDIC      RIGHT WRIST    HX ROTATOR CUFF REPAIR Left     HX TONSILLECTOMY  CHILDHOOD       Prior Level of Function/Environment/Context:   Expanded or extensive additional review of patient history:     Home Situation  Home Environment: Private residence  # Steps to Enter: 3  Rails to Enter: Yes  Hand Rails : Bilateral  One/Two Story Residence: Two story  Living Alone: No  Support Systems: Spouse/Significant Other  Current DME Used/Available at Home: Grab bars,Shower chair,Raised toilet seat  Tub or Shower Type: Shower    Hand dominance: Right    EXAMINATION OF PERFORMANCE DEFICITS:  Cognitive/Behavioral Status:  Neurologic State: Alert  Orientation Level: Oriented X4  Cognition: Appropriate for age attention/concentration; Follows commands  Perception: Appears intact  Perseveration: No perseveration noted  Safety/Judgement: Awareness of environment    Skin: Appears intact, C-collar in place    Edema: None    Hearing:       Vision/Perceptual:    Tracking: Able to track stimulus in all quadrants w/o difficulty                      Acuity: Within Defined Limits         Range of Motion:  AROM: Generally decreased, functional (LUE slightly decreased d/t hx of RTC injury)                         Strength:  Strength: Generally decreased, functional                Coordination:  Coordination: Generally decreased, functional  Fine Motor Skills-Upper: Left Intact; Right Intact    Gross Motor Skills-Upper: Left Impaired;Right Intact    Tone & Sensation:  Tone: Normal  Sensation: Intact                      Balance:  Sitting: Intact  Standing: Impaired  Standing - Static: Good  Standing - Dynamic : Fair    Functional Mobility and Transfers for ADLs:  Bed Mobility:  Supine to Sit: Stand-by assistance  Sit to Supine:  (in chair)  Scooting: Supervision    Transfers:  Sit to Stand: Contact guard assistance;Stand-by assistance  Stand to Sit: Contact guard assistance  Bathroom Mobility: Contact guard assistance  Toilet Transfer : Stand-by assistance (with grab bar)  Assistive Device : Gait Belt;Walker, rolling    ADL Assessment:  Feeding: Independent    Oral Facial Hygiene/Grooming: Stand-by assistance    Bathing: Contact guard assistance    Upper Body Dressing: Stand-by assistance (including cervical collar)    Lower Body Dressing: Contact guard assistance    Toileting: Stand by assistance                ADL Intervention and task modifications:  Patient instructed and demonstrated 3/3 cervical spine precautions with mod cues fair carryover. Patient instructed and indicated understanding the benefits of maintaining activity tolerance, functional mobility, and independence with self care tasks during acute stay  to ensure safe return home and to baseline. Encouraged patient to increase frequency and duration OOB, not sitting longer than 30 mins without marching/walking with staff, be out of bed for all meals, perform daily ADLs (as approved by RN/MD regarding bathing etc), and performing functional mobility to/from bathroom. Patient instruction and indicated understanding on body mechanics, ergonomics and gravitational force on the spine during different body positions to plan activities in prep for return home to complete basic ADLs, instrumental ADLs and back to work safely.    Bathing: Patient instructed and indicated understanding when bathing to not submerge wound in water, stand to shower or sponge bathe, cover wound with plastic and tape to ensure no water reaches bandage/wound without cues.    Dressing brace: Patient instructed and demonstrated while in front of mirror to don/doff velcro on brace using dominant side, keeping non-dominant side intact. Instruction and indicated understanding in removal of fabric pieces, placement of clean pieces, don brace, then can hand wash and allow air dry. Dressing lower body: Patient instructed to don brace first and on the benefits to remain seated to don all clothing to increase independence with precautions and pain management. Patient instructed and demonstrated tailor sitting for lower body dressing with SBA-CGA. Toileting: Patient instructed on the benefits of using flushable wet wipes and toilet tongs if decreased reach or pain for sarah care. Also, the benefits of a reacher to aid in clothing management. Home safety: Patient instructed and indicated understanding on home modifications and safety [raise height of ADL objects (i.e. clothing, sink items, fridge items, items to mouth when grooming), change of floor surfaces, clear pathways] to increase independence and fall prevention. Standing: Patient instructed and indicated understanding to walk up to sink/counter top/surfaces, step into walker, square off while using objects, slide objects along surfaces, to increase adherence to back precautions and fall prevention. Functional Measure:    Barthel Index:  Bathin  Bladder: 10  Bowels: 10  Groomin  Dressing: 10  Feeding: 10  Mobility: 10  Stairs: 5  Toilet Use: 10  Transfer (Bed to Chair and Back): 10  Total: 85/100      The Barthel ADL Index: Guidelines  1. The index should be used as a record of what a patient does, not as a record of what a patient could do. 2. The main aim is to establish degree of independence from any help, physical or verbal, however minor and for whatever reason. 3. The need for supervision renders the patient not independent.   4. A patient's performance should be established using the best available evidence. Asking the patient, friends/relatives and nurses are the usual sources, but direct observation and common sense are also important. However direct testing is not needed. 5. Usually the patient's performance over the preceding 24-48 hours is important, but occasionally longer periods will be relevant. 6. Middle categories imply that the patient supplies over 50 per cent of the effort. 7. Use of aids to be independent is allowed. Score Interpretation (from [address] [facility] [address])    Independent   60-79 Minimally independent   40-59 Partially dependent   20-39 Very dependent   <20 Totally dependent     -Nathaly Iyer., Barthel, D.W. (1965). Functional evaluation: the Barthel Index. [address] ([address] (1997). The Barthel activities of daily living index: self-reporting versus actual performance in the old (> or = 75 years). Journal of [address] [place] [address] 45(7), [address] [facility], VANGIE, Jesusita Garcia., [facility]. (1999). Measuring the change in disability after inpatient rehabilitation; comparison of the responsiveness of the Barthel Index and Functional [place] Measure. Journal of Neurology, Neurosurgery, and Psychiatry, 66(4), 161-015. Suzette Avalos, N.J.A, ELIAS Beltrán, & Pepper Simpson MSusanA. (2004) Assessment of post-stroke quality of life in cost-effectiveness studies: The usefulness of the Barthel Index and the EuroQoL-5D.  Quality of Life Research, 15, 132-03       Occupational Therapy Evaluation Charge Determination   History Examination Decision-Making   LOW Complexity : Brief history review  MEDIUM Complexity : 3-5 performance deficits relating to physical, cognitive , or psychosocial skils that result in activity limitations and / or participation restrictions LOW Complexity : No comorbidities that affect functional and no verbal or physical assistance needed to complete eval tasks       Based on the above components, the patient evaluation is determined to be of the following complexity level: LOW   Pain Rating:  Neck pain reported with all activity, stable    Activity Tolerance:   Fair and requires frequent rest breaks, dizziness throughout    After treatment patient left in no apparent distress:    Sitting in chair and Call bell within reach    COMMUNICATION/EDUCATION:   The patients plan of care was discussed with: Physical therapist and Registered nurse.      Thank you for this referral.  Yadi Rosa, OTD, OTR/L  Time Calculation: 39 mins

## 2022-02-13 PROCEDURE — 97116 GAIT TRAINING THERAPY: CPT

## 2022-02-13 PROCEDURE — 94640 AIRWAY INHALATION TREATMENT: CPT

## 2022-02-13 PROCEDURE — 94760 N-INVAS EAR/PLS OXIMETRY 1: CPT

## 2022-02-13 PROCEDURE — 74011000250 HC RX REV CODE- 250: Performed by: STUDENT IN AN ORGANIZED HEALTH CARE EDUCATION/TRAINING PROGRAM

## 2022-02-13 PROCEDURE — 74011250637 HC RX REV CODE- 250/637: Performed by: STUDENT IN AN ORGANIZED HEALTH CARE EDUCATION/TRAINING PROGRAM

## 2022-02-13 RX ADMIN — ACETAMINOPHEN 1000 MG: 500 TABLET ORAL at 06:30

## 2022-02-13 RX ADMIN — OXYCODONE 10 MG: 5 TABLET ORAL at 11:01

## 2022-02-13 RX ADMIN — IPRATROPIUM BROMIDE 0.5 MG: 0.5 SOLUTION RESPIRATORY (INHALATION) at 07:36

## 2022-02-13 RX ADMIN — PANTOPRAZOLE SODIUM 40 MG: 40 TABLET, DELAYED RELEASE ORAL at 06:30

## 2022-02-13 RX ADMIN — SODIUM CHLORIDE, PRESERVATIVE FREE 10 ML: 5 INJECTION INTRAVENOUS at 14:00

## 2022-02-13 RX ADMIN — SODIUM CHLORIDE, PRESERVATIVE FREE 10 ML: 5 INJECTION INTRAVENOUS at 22:26

## 2022-02-13 RX ADMIN — Medication 1 TABLET: at 08:16

## 2022-02-13 RX ADMIN — SODIUM CHLORIDE, PRESERVATIVE FREE 10 ML: 5 INJECTION INTRAVENOUS at 06:41

## 2022-02-13 RX ADMIN — OXYCODONE 10 MG: 5 TABLET ORAL at 17:23

## 2022-02-13 RX ADMIN — ACETAMINOPHEN 1000 MG: 500 TABLET ORAL at 14:14

## 2022-02-13 RX ADMIN — SENNOSIDES AND DOCUSATE SODIUM 1 TABLET: 50; 8.6 TABLET ORAL at 17:23

## 2022-02-13 RX ADMIN — MONTELUKAST 10 MG: 10 TABLET, FILM COATED ORAL at 08:16

## 2022-02-13 RX ADMIN — DILTIAZEM HYDROCHLORIDE 240 MG: 120 CAPSULE, COATED, EXTENDED RELEASE ORAL at 22:24

## 2022-02-13 RX ADMIN — OXYCODONE 10 MG: 5 TABLET ORAL at 22:24

## 2022-02-13 RX ADMIN — CETIRIZINE HYDROCHLORIDE 10 MG: 10 TABLET, FILM COATED ORAL at 08:16

## 2022-02-13 RX ADMIN — POLYETHYLENE GLYCOL 3350 17 G: 17 POWDER, FOR SOLUTION ORAL at 08:17

## 2022-02-13 RX ADMIN — SENNOSIDES AND DOCUSATE SODIUM 1 TABLET: 50; 8.6 TABLET ORAL at 08:16

## 2022-02-13 RX ADMIN — LOSARTAN POTASSIUM 100 MG: 50 TABLET, FILM COATED ORAL at 08:16

## 2022-02-13 RX ADMIN — ZOLPIDEM TARTRATE 10 MG: 5 TABLET ORAL at 22:29

## 2022-02-13 RX ADMIN — Medication 1 TABLET: at 17:23

## 2022-02-13 RX ADMIN — CYCLOBENZAPRINE 10 MG: 10 TABLET, FILM COATED ORAL at 14:14

## 2022-02-13 RX ADMIN — LORAZEPAM 0.5 MG: 0.5 TABLET ORAL at 22:24

## 2022-02-13 RX ADMIN — ROSUVASTATIN CALCIUM 10 MG: 10 TABLET, FILM COATED ORAL at 22:24

## 2022-02-13 RX ADMIN — OXYCODONE 10 MG: 5 TABLET ORAL at 14:42

## 2022-02-13 RX ADMIN — GABAPENTIN 300 MG: 300 CAPSULE ORAL at 22:24

## 2022-02-13 NOTE — PROGRESS NOTES
POD 2 Days Post-Op    Procedure:  Procedure(s):  C5-6, C6-7 ANTERIOR CERVICAL DISCECTOMY WITH FUSION (FAST TRACK)    Subjective:   Pain is well controlled. Mild neck pain, no radicular symptoms. Hoping to d/c today but has been having difficulty with ambulating safely with PT    Objective:     Blood pressure (!) 144/78, pulse 83, temperature 97.7 °F (36.5 °C), resp. rate 16, height 5' 6\" (1.676 m), weight 190 lb (86.2 kg), SpO2 94 %. Temp (24hrs), Av.1 °F (36.7 °C), Min:97.7 °F (36.5 °C), Max:98.4 °F (36.9 °C)      Physical Exam:  Soft cervical collar in place. Sensation is intact to light touch in the arms/hands. Brisk capillary refill.      Labs:   Lab Results   Component Value Date/Time    HGB 9.8 (L) 2022 11:53 AM         Assessment:     Active Problems:    Cervical stenosis of spine (2022)    s/p C5-7 ACDF     Plan/Recommendations/Medical Decision Making:     Continue physical therapy  PT recommending SNF due to difficulty ambulating safely  CM consult  Up with assistance  SCDs/MING hose  May need CIWA protocol started if she declines  May need SNF

## 2022-02-13 NOTE — PROGRESS NOTES
Pt seen for PT session, POD #2 from C5-7 ACDF. Pt demos INCREASED safety concerns, unstable gait with scissoring stepping (crossing midline), very impulsive, required min A for prevention of falling during gait and decreased awareness of issue with balance. Added RW during gait to attempt to increase stability with minimal improvement, continues to have safety concerns with balance, impulsiveness and increased ROM throughout Cspine in soft collar with poor carryover of BLT precautions. No dizziness with gait. Pt had been mobilizing in room but currently does not appear safe. Instructed pt in staying in the chair and waiting for assistance to transfer back to bed. Pt admits to ETOH daily (1-2 drinks/night), may require assessment, with typical gait patterns for ETOH use. Will continue to follow, not cleared to DC at this time.      Odell Hammonds, DPT, PT

## 2022-02-13 NOTE — PROGRESS NOTES
Problem: Mobility Impaired (Adult and Pediatric)  Goal: *Acute Goals and Plan of Care (Insert Text)  Description: FUNCTIONAL STATUS PRIOR TO ADMISSION: Pt reports indep LOF with mobility and ADLs. Notes difficulty with stair negotiation due to WHEELER/ COPD    HOME SUPPORT PRIOR TO ADMISSION: The patient lived with  but did not require assist.    Physical Therapy Goals  Initiated 2/12/2022  1. Patient will move from supine to sit and sit to supine  in bed with modified independence within 7 day(s). 2.  Patient will transfer from bed to chair and chair to bed with modified independence using the least restrictive device within 7 day(s). 3.  Patient will perform sit to stand with modified independence within 7 day(s). 4.  Patient will ambulate with supervision/set-up for 150 feet with the least restrictive device within 7 day(s). 5.  Patient will ascend/descend at least 4 stairs with bilat handrail(s) with supervision/set-up within 7 day(s). Outcome: Progressing Towards Goal     PHYSICAL THERAPY TREATMENT  Patient: Sue Pacheco (55 y.o. female)  Date: 2/13/2022  Diagnosis: Cervical stenosis of spine [M48.02] <principal problem not specified>  Procedure(s) (LRB):  C5-6, C6-7 ANTERIOR CERVICAL DISCECTOMY WITH FUSION (FAST TRACK) (N/A) 2 Days Post-Op  Precautions: Fall (cervical) No bending, no lifting greater than 5 lbs, no twisting, log-roll technique, repositioning every 20-30 min except when sleeping, brace when OOB (if ordered)  Chart, physical therapy assessment, plan of care and goals were reviewed. ASSESSMENT  Patient continues with skilled PT services and is progressing towards goals. Pt received in supine, pt impulsively supine to sit without performing log roll, cued to remember log roll next time to avoid bending. Pt impulsively stands without someone close, appears to stumble and catches self with cross over step as stating, \"I'm not dizzy anymore\" educated to not get up without someone. Pt performs gait training x300ft CGA - Hiral for steadying, pt demonstrates scissoring gait, path deviations, accelerated pace and increased trunk sway, cued but does not improve. Pt cued multiple times during gait to avoid turning head to talk, shaking head for \"yes\" and \"no\" and looking down at feet, but poor carryover with spinal precautions throughout even with max cues. Rw added for increased stability, pt minimally improves gait abnormalities, but states, \"I do feel like I have more support. \" Pt performed stair training x4 CGA-Hiral for steadying with the use of bilateral hand rails, cued to not look down at feet to avoid bending of neck. Pt performed stand to sit CGA to chair, cues to keep walker close and reach back to surface before sitting. Discussed safety concerns and balance deficits with pt and recommended staying another night. Before leaving room, restated that pt should not be getting up without someone present in the room. Notified RN of safety concerns/awareness, poor carryover of spinal precautions and gait abnormalities that may be associated with pt's daily ETOH usage (1-2 drinks/night). RN will follow up with doctor about hard collar brace. Reviewed back precautions, sitting limit of 30-45 minutes, positioning in chair, and progress. Pt is not cleared for discharge from Physical Therapy standpoint at this time, recommend HHPT pending improved safety awareness. Will benefit from therapy in acute setting, anticipate will improve tolerance quickly with improved pain control. Current Level of Function Impacting Discharge (mobility/balance): CGA - Hiral mobility due to poor safety awareness and balance deficits. Other factors to consider for discharge: poor carryover of precautions, may benefit from hard collar to help with decreasing ROM allowed         PLAN :  Patient continues to benefit from skilled intervention to address the above impairments.   Continue treatment per established plan of care.  to address goals. Recommendation for discharge: (in order for the patient to meet his/her long term goals)  To be determined: HHPT pending improved safety awareness and balance. This discharge recommendation:  Has been made in collaboration with the attending provider and/or case management    IF patient discharges home will need the following DME: to be determined (TBD)       SUBJECTIVE:   Patient stated so are you letting me go home now?  Discussed staying another night to ensure safe d/c home d/t balance deficits, gait abnormalities and not having anyone available to help once home other than . Pt replies \"well he can barely walk sometimes cause he has bad feet, but I think he would be fine once he's able to get going\" Restated that it would not be the safest option, therefore recommending to stay. OBJECTIVE DATA SUMMARY:   Critical Behavior:  Neurologic State: Alert  Orientation Level: Oriented X4  Cognition: Appropriate decision making,Appropriate for age attention/concentration,Appropriate safety awareness,Follows commands  Safety/Judgement: Awareness of environment    Spinal diagnosis intervention:  The patient stated 3/3 back precautions when prompted. Reviewed all 3 back precautions, log roll technique, and sitting for 30 minutes at a time. The patient required max cues to maintain back precautions during functional activity. Functional Mobility Training:    Bed Mobility:  Log    Supine to Sit: Stand-by assistance (did not perform log roll, needs cues)  Scooting: Stand-by assistance  Transfers:  Sit to Stand: Contact guard assistance  Stand to Sit: Contact guard assistance  Balance:  Sitting: Intact  Standing: Impaired; With support  Standing - Static: Constant support;Good  Standing - Dynamic : Fair;Constant support  Ambulation/Gait Training:  Distance (ft): 300 Feet (ft)  Assistive Device: Gait belt;Brace/Splint; Valentino Sings, rolling; Other (comment) (no AD -> rw for safety concerns)  Ambulation - Level of Assistance: Minimal assistance;Contact guard assistance  Gait Abnormalities: Scissoring;Path deviations;Trunk sway increased;Decreased step clearance  Base of Support: Narrowed  Speed/Natalie: Accelerated  Step Length: Right shortened;Left shortened  Stairs:  Number of Stairs Trained: 4  Stairs - Level of Assistance: Contact guard assistance;Minimum assistance   Rail Use: Both  Pain Ratin/10    Activity Tolerance:   Good and poor safety awareness    After treatment patient left in no apparent distress:   Sitting in chair, Call bell within reach, and Bed / chair alarm activated    COMMUNICATION/COLLABORATION:   The patients plan of care was discussed with: Registered nurse.      Marleen Allison, SPT   Time Calculation: 17 mins

## 2022-02-13 NOTE — PROGRESS NOTES
Problem: Falls - Risk of  Goal: *Absence of Falls  Description: Document Mik Montalvo Fall Risk and appropriate interventions in the flowsheet.   Outcome: Progressing Towards Goal  Note: Fall Risk Interventions:  Mobility Interventions: Patient to call before getting OOB,Utilize walker, cane, or other assistive device    Medication Interventions: Teach patient to arise slowly    Elimination Interventions: Call light in reach,Patient to call for help with toileting needs

## 2022-02-13 NOTE — PROGRESS NOTES
DAMIAN- Home with [address] spoke with Samantha with [address]. She stated that she can accept this pt for home health. CM placed this information on pt's chart.  Madhav Soriano

## 2022-02-14 ENCOUNTER — HOME HEALTH ADMISSION (OUTPATIENT)
Dept: HOME HEALTH SERVICES | Facility: HOME HEALTH | Age: 67
End: 2022-02-14
Payer: MEDICARE

## 2022-02-14 PROCEDURE — 97116 GAIT TRAINING THERAPY: CPT

## 2022-02-14 PROCEDURE — 94640 AIRWAY INHALATION TREATMENT: CPT

## 2022-02-14 PROCEDURE — 74011000250 HC RX REV CODE- 250: Performed by: STUDENT IN AN ORGANIZED HEALTH CARE EDUCATION/TRAINING PROGRAM

## 2022-02-14 PROCEDURE — 74011250637 HC RX REV CODE- 250/637: Performed by: STUDENT IN AN ORGANIZED HEALTH CARE EDUCATION/TRAINING PROGRAM

## 2022-02-14 RX ADMIN — OXYCODONE 10 MG: 5 TABLET ORAL at 04:44

## 2022-02-14 RX ADMIN — SENNOSIDES AND DOCUSATE SODIUM 1 TABLET: 50; 8.6 TABLET ORAL at 15:48

## 2022-02-14 RX ADMIN — SODIUM CHLORIDE, PRESERVATIVE FREE 10 ML: 5 INJECTION INTRAVENOUS at 22:06

## 2022-02-14 RX ADMIN — CYCLOBENZAPRINE 10 MG: 10 TABLET, FILM COATED ORAL at 10:28

## 2022-02-14 RX ADMIN — CETIRIZINE HYDROCHLORIDE 10 MG: 10 TABLET, FILM COATED ORAL at 08:54

## 2022-02-14 RX ADMIN — Medication 1 TABLET: at 08:54

## 2022-02-14 RX ADMIN — PANTOPRAZOLE SODIUM 40 MG: 40 TABLET, DELAYED RELEASE ORAL at 07:57

## 2022-02-14 RX ADMIN — Medication 1 TABLET: at 08:53

## 2022-02-14 RX ADMIN — IPRATROPIUM BROMIDE 0.5 MG: 0.5 SOLUTION RESPIRATORY (INHALATION) at 07:26

## 2022-02-14 RX ADMIN — LOSARTAN POTASSIUM 100 MG: 50 TABLET, FILM COATED ORAL at 08:53

## 2022-02-14 RX ADMIN — OXYCODONE 5 MG: 5 TABLET ORAL at 08:56

## 2022-02-14 RX ADMIN — MONTELUKAST 10 MG: 10 TABLET, FILM COATED ORAL at 08:54

## 2022-02-14 RX ADMIN — ACETAMINOPHEN 1000 MG: 500 TABLET ORAL at 15:48

## 2022-02-14 RX ADMIN — ROSUVASTATIN CALCIUM 10 MG: 10 TABLET, FILM COATED ORAL at 22:06

## 2022-02-14 RX ADMIN — ACETAMINOPHEN 1000 MG: 500 TABLET ORAL at 10:26

## 2022-02-14 RX ADMIN — SODIUM CHLORIDE, PRESERVATIVE FREE 10 ML: 5 INJECTION INTRAVENOUS at 07:57

## 2022-02-14 RX ADMIN — GABAPENTIN 300 MG: 300 CAPSULE ORAL at 22:06

## 2022-02-14 RX ADMIN — POLYETHYLENE GLYCOL 3350 17 G: 17 POWDER, FOR SOLUTION ORAL at 08:53

## 2022-02-14 RX ADMIN — LORAZEPAM 0.5 MG: 0.5 TABLET ORAL at 22:06

## 2022-02-14 RX ADMIN — OXYCODONE 10 MG: 5 TABLET ORAL at 12:09

## 2022-02-14 RX ADMIN — OXYCODONE 10 MG: 5 TABLET ORAL at 15:48

## 2022-02-14 RX ADMIN — Medication 1 TABLET: at 15:48

## 2022-02-14 RX ADMIN — OXYCODONE 10 MG: 5 TABLET ORAL at 19:06

## 2022-02-14 RX ADMIN — SENNOSIDES AND DOCUSATE SODIUM 1 TABLET: 50; 8.6 TABLET ORAL at 08:53

## 2022-02-14 RX ADMIN — ACETAMINOPHEN 1000 MG: 500 TABLET ORAL at 22:06

## 2022-02-14 RX ADMIN — LORAZEPAM 0.5 MG: 0.5 TABLET ORAL at 10:26

## 2022-02-14 RX ADMIN — DILTIAZEM HYDROCHLORIDE 240 MG: 120 CAPSULE, COATED, EXTENDED RELEASE ORAL at 22:04

## 2022-02-14 NOTE — PROGRESS NOTES
Problem: Mobility Impaired (Adult and Pediatric)  Goal: *Acute Goals and Plan of Care (Insert Text)  Description: FUNCTIONAL STATUS PRIOR TO ADMISSION: Pt reports indep LOF with mobility and ADLs. Notes difficulty with stair negotiation due to WHEELER/ COPD    HOME SUPPORT PRIOR TO ADMISSION: The patient lived with  but did not require assist.    Physical Therapy Goals  Initiated 2/12/2022  1. Patient will move from supine to sit and sit to supine  in bed with modified independence within 7 day(s). 2.  Patient will transfer from bed to chair and chair to bed with modified independence using the least restrictive device within 7 day(s). 3.  Patient will perform sit to stand with modified independence within 7 day(s). 4.  Patient will ambulate with supervision/set-up for 150 feet with the least restrictive device within 7 day(s). 5.  Patient will ascend/descend at least 4 stairs with bilat handrail(s) with supervision/set-up within 7 day(s). Outcome: Progressing Towards Goal   PHYSICAL THERAPY TREATMENT  Patient: Kyle Allen (3 y.o. female)  Date: 2/14/2022  Diagnosis: Cervical stenosis of spine [M48.02] <principal problem not specified>  Procedure(s) (LRB):  C5-6, C6-7 ANTERIOR CERVICAL DISCECTOMY WITH FUSION (FAST TRACK) (N/A) 3 Days Post-Op  Precautions: Fall (cervical)  Chart, physical therapy assessment, plan of care and goals were reviewed. ASSESSMENT  Patient continues with skilled PT services and is progressing towards goals. She reports increased pain and feeling \"off\" today. Patient transitions supine to sit with appropriate use of log roll technique provided VC. She demonstrates good sitting balance. Patient ambulates with increased trunk sway, path deviations and the need for CGA- Min A for safety. SPC is provided to assist with balance but patient continues to demonstrate decreased balance. She is set up in recliner chair to attempt to stay up through lunch.  LE are elevated and patient is alyson reclined for comfort. BP is assessed. Patient diastolic is elevated >13. Current Level of Function Impacting Discharge (mobility/balance): Min A     Other factors to consider for discharge: medical stability, decreased balance, increased risk for falls         PLAN :  Patient continues to benefit from skilled intervention to address the above impairments. Continue treatment per established plan of care. to address goals. Recommendation for discharge: (in order for the patient to meet his/her long term goals)  Physical therapy at least 2 days/week in the home AND ensure assist and/or supervision for safety with functional mobility     This discharge recommendation:  Has been made in collaboration with the attending provider and/or case management    IF patient discharges home will need the following DME: to be determined (TBD)       SUBJECTIVE:   Patient stated I just don't feel right.     OBJECTIVE DATA SUMMARY:   Critical Behavior:  Neurologic State: Alert  Orientation Level: Oriented X4  Cognition: Appropriate decision making,Appropriate for age attention/concentration,Appropriate safety awareness  Safety/Judgement: Awareness of environment  Functional Mobility Training:  Bed Mobility:     Supine to Sit: Minimum assistance     Scooting: Stand-by assistance        Transfers:  Sit to Stand: Contact guard assistance  Stand to Sit: Contact guard assistance                             Balance:  Sitting: Intact  Standing: Impaired; With support  Standing - Static: Constant support;Good  Standing - Dynamic : Constant support; Fair  Ambulation/Gait Training:  Distance (ft): 120 Feet (ft)  Assistive Device: Gait belt;Cane, straight  Ambulation - Level of Assistance: Minimal assistance        Gait Abnormalities: Scissoring;Path deviations;Trunk sway increased;Decreased step clearance        Base of Support: Narrowed     Speed/Natalie: Accelerated                       Stairs:               Therapeutic Exercises:     Pain Rating:      Activity Tolerance:   Fair    After treatment patient left in no apparent distress:   Sitting in chair, Call bell within reach, and Bed / chair alarm activated    COMMUNICATION/COLLABORATION:   The patients plan of care was discussed with: Registered nurse.      Jacqueline Galeano, PT   Time Calculation: 18 mins

## 2022-02-14 NOTE — PROGRESS NOTES
Problem: Falls - Risk of  Goal: *Absence of Falls  Description: Document Jarred Dill Fall Risk and appropriate interventions in the flowsheet.   Outcome: Progressing Towards Goal  Note: Fall Risk Interventions:  Mobility Interventions: Bed/chair exit alarm,Patient to call before getting OOB,Utilize walker, cane, or other assistive device    Medication Interventions: Bed/chair exit alarm,Patient to call before getting OOB,Teach patient to arise slowly    Elimination Interventions: Call light in reach

## 2022-02-14 NOTE — PROGRESS NOTES
Orthopedic Spine Progress Note  Post Op day: 3 Days Post-Op    2022 7:04 AM   Admit Date: 2022  Procedure: Procedure(s):  C5-6, C6-7 ANTERIOR CERVICAL DISCECTOMY WITH FUSION (FAST TRACK)    Subjective:     Carolyn Yip doing well this AM. Still rates her pain a 7/10 but denies any radiation of pain or numbness or tingling. Feels as though she is ambulating well although PT feels as though she is having trouble. Tolerating diet. No N/V. Pain Control:   Pain Assessment  Pain Scale 1: Numeric (0 - 10)  Pain Intensity 1: 8  Pain Onset 1: Postop  Pain Location 1: Neck  Pain Orientation 1: Posterior  Pain Description 1: Aching  Pain Intervention(s) 1: Medication (see MAR)    Objective:          Physical Exam:  General:  Alert and oriented. No acute distress. Heart:  Respirations unlabored. Abdomen:   Extremities: Soft, non-tender. No evidence of cyanosis. Pulses palpable in both upper and lower extremities. Neurologic:  Musculoskeletal:  No new motor deficits. Neurovascular exam within normal limits. Sensation stable. Motor: unchanged C5-T1 and L2-S1. Elisa's sign negative in bilateral lower extremities. Calves soft, nontender upon palpation and with passive twitch. Moves both upper and lower extremities. Incision: clean, dry, and intact. No significant erythema or swelling. No active drainage noted. Vital Signs:    Blood pressure 131/86, pulse 99, temperature 97.7 °F (36.5 °C), resp. rate 20, height 5' 6\" (1.676 m), weight 190 lb (86.2 kg), SpO2 90 %.   Temp (24hrs), Av.9 °F (36.6 °C), Min:97.7 °F (36.5 °C), Max:98.1 °F (36.7 °C)      LAB:    Recent Labs     22  1153   HCT 30.7*   HGB 9.8*     Lab Results   Component Value Date/Time    Sodium 138 2022 03:14 PM    Potassium 4.5 2022 03:14 PM    Chloride 106 2022 03:14 PM    CO2 27 2022 03:14 PM    Glucose 94 2022 03:14 PM    BUN 26 (H) 2022 03:14 PM    Creatinine 1.46 (H) 02/04/2022 03:14 PM    Calcium 9.3 02/04/2022 03:14 PM       Intake/Output:No intake/output data recorded. No intake/output data recorded. PT/OT:   Gait:  Gait  Base of Support: Narrowed  Speed/Natalie: Accelerated  Step Length: Right shortened,Left shortened  Gait Abnormalities: Scissoring,Path deviations,Trunk sway increased,Decreased step clearance  Ambulation - Level of Assistance: Minimal assistance,Contact guard assistance  Distance (ft): 300 Feet (ft)  Assistive Device: Gait belt,Brace/Splint,Walker, rolling,Other (comment) (no AD -> rw for safety concerns)  Rail Use: Both  Stairs - Level of Assistance: Contact guard assistance,Minimum assistance  Number of Stairs Trained: 4                 Assessment:   Patient is 3 Days Post-Op s/p Procedure(s):  C5-6, C6-7 ANTERIOR CERVICAL DISCECTOMY WITH FUSION (FAST TRACK)    Plan:     1. Continue PT/OT  2. Continue established methods of pain control  3. VTE Prophylaxes - TEDS &/or SCDs   4. Continue use of soft collar  5.  D/c pending PT clearance      Signed By: Manuel Gee PA-C

## 2022-02-15 PROCEDURE — 94640 AIRWAY INHALATION TREATMENT: CPT

## 2022-02-15 PROCEDURE — 74011250637 HC RX REV CODE- 250/637: Performed by: STUDENT IN AN ORGANIZED HEALTH CARE EDUCATION/TRAINING PROGRAM

## 2022-02-15 PROCEDURE — 74011250636 HC RX REV CODE- 250/636: Performed by: PHYSICIAN ASSISTANT

## 2022-02-15 PROCEDURE — 97116 GAIT TRAINING THERAPY: CPT

## 2022-02-15 PROCEDURE — 74011000250 HC RX REV CODE- 250: Performed by: STUDENT IN AN ORGANIZED HEALTH CARE EDUCATION/TRAINING PROGRAM

## 2022-02-15 PROCEDURE — 74011250637 HC RX REV CODE- 250/637: Performed by: PHYSICIAN ASSISTANT

## 2022-02-15 RX ORDER — LORAZEPAM 0.5 MG/1
0.5 TABLET ORAL
Status: DISCONTINUED | OUTPATIENT
Start: 2022-02-15 | End: 2022-02-16 | Stop reason: HOSPADM

## 2022-02-15 RX ORDER — SODIUM CHLORIDE 9 MG/ML
100 INJECTION, SOLUTION INTRAVENOUS CONTINUOUS
Status: DISCONTINUED | OUTPATIENT
Start: 2022-02-15 | End: 2022-02-16 | Stop reason: HOSPADM

## 2022-02-15 RX ORDER — OXYCODONE HYDROCHLORIDE 5 MG/1
5 TABLET ORAL
Status: DISCONTINUED | OUTPATIENT
Start: 2022-02-15 | End: 2022-02-16 | Stop reason: HOSPADM

## 2022-02-15 RX ORDER — TRAMADOL HYDROCHLORIDE 50 MG/1
50 TABLET ORAL
Status: DISCONTINUED | OUTPATIENT
Start: 2022-02-15 | End: 2022-02-16 | Stop reason: HOSPADM

## 2022-02-15 RX ORDER — GABAPENTIN 300 MG/1
300 CAPSULE ORAL 2 TIMES DAILY
Status: DISCONTINUED | OUTPATIENT
Start: 2022-02-15 | End: 2022-02-16 | Stop reason: HOSPADM

## 2022-02-15 RX ADMIN — SODIUM CHLORIDE, PRESERVATIVE FREE 10 ML: 5 INJECTION INTRAVENOUS at 07:12

## 2022-02-15 RX ADMIN — Medication 1 TABLET: at 17:37

## 2022-02-15 RX ADMIN — IPRATROPIUM BROMIDE 0.5 MG: 0.5 SOLUTION RESPIRATORY (INHALATION) at 07:52

## 2022-02-15 RX ADMIN — ACETAMINOPHEN 1000 MG: 500 TABLET ORAL at 08:23

## 2022-02-15 RX ADMIN — Medication 1 TABLET: at 08:21

## 2022-02-15 RX ADMIN — ACETAMINOPHEN 1000 MG: 500 TABLET ORAL at 22:09

## 2022-02-15 RX ADMIN — OXYCODONE 10 MG: 5 TABLET ORAL at 07:33

## 2022-02-15 RX ADMIN — SODIUM CHLORIDE 100 ML/HR: 900 INJECTION, SOLUTION INTRAVENOUS at 09:30

## 2022-02-15 RX ADMIN — SENNOSIDES AND DOCUSATE SODIUM 1 TABLET: 50; 8.6 TABLET ORAL at 08:21

## 2022-02-15 RX ADMIN — POLYETHYLENE GLYCOL 3350 17 G: 17 POWDER, FOR SOLUTION ORAL at 08:21

## 2022-02-15 RX ADMIN — CYCLOBENZAPRINE 10 MG: 10 TABLET, FILM COATED ORAL at 08:21

## 2022-02-15 RX ADMIN — ACETAMINOPHEN 1000 MG: 500 TABLET ORAL at 17:37

## 2022-02-15 RX ADMIN — MONTELUKAST 10 MG: 10 TABLET, FILM COATED ORAL at 08:22

## 2022-02-15 RX ADMIN — SENNOSIDES AND DOCUSATE SODIUM 1 TABLET: 50; 8.6 TABLET ORAL at 17:37

## 2022-02-15 RX ADMIN — PANTOPRAZOLE SODIUM 40 MG: 40 TABLET, DELAYED RELEASE ORAL at 07:33

## 2022-02-15 RX ADMIN — OXYCODONE 5 MG: 5 TABLET ORAL at 12:16

## 2022-02-15 RX ADMIN — GABAPENTIN 300 MG: 300 CAPSULE ORAL at 10:52

## 2022-02-15 RX ADMIN — ROSUVASTATIN CALCIUM 10 MG: 10 TABLET, FILM COATED ORAL at 21:00

## 2022-02-15 RX ADMIN — CYCLOBENZAPRINE 10 MG: 10 TABLET, FILM COATED ORAL at 21:00

## 2022-02-15 RX ADMIN — LOSARTAN POTASSIUM 100 MG: 50 TABLET, FILM COATED ORAL at 08:21

## 2022-02-15 RX ADMIN — SODIUM CHLORIDE, PRESERVATIVE FREE 5 ML: 5 INJECTION INTRAVENOUS at 14:00

## 2022-02-15 RX ADMIN — CETIRIZINE HYDROCHLORIDE 10 MG: 10 TABLET, FILM COATED ORAL at 08:22

## 2022-02-15 RX ADMIN — SODIUM CHLORIDE, PRESERVATIVE FREE 10 ML: 5 INJECTION INTRAVENOUS at 21:01

## 2022-02-15 RX ADMIN — GABAPENTIN 300 MG: 300 CAPSULE ORAL at 17:37

## 2022-02-15 RX ADMIN — DILTIAZEM HYDROCHLORIDE 240 MG: 120 CAPSULE, COATED, EXTENDED RELEASE ORAL at 21:00

## 2022-02-15 NOTE — PROGRESS NOTES
Problem: Mobility Impaired (Adult and Pediatric)  Goal: *Acute Goals and Plan of Care (Insert Text)  Description: FUNCTIONAL STATUS PRIOR TO ADMISSION: Pt reports indep LOF with mobility and ADLs. Notes difficulty with stair negotiation due to WHEELER/ COPD    HOME SUPPORT PRIOR TO ADMISSION: The patient lived with  but did not require assist.    Physical Therapy Goals  Initiated 2/12/2022  1. Patient will move from supine to sit and sit to supine  in bed with modified independence within 7 day(s). 2.  Patient will transfer from bed to chair and chair to bed with modified independence using the least restrictive device within 7 day(s). 3.  Patient will perform sit to stand with modified independence within 7 day(s). 4.  Patient will ambulate with supervision/set-up for 150 feet with the least restrictive device within 7 day(s). 5.  Patient will ascend/descend at least 4 stairs with bilat handrail(s) with supervision/set-up within 7 day(s). 2/15/2022 1609 by Tony Mensah PT  Outcome: Progressing Towards Goal   PHYSICAL THERAPY TREATMENT  Patient: Kenna Becker (18 y.o. female)  Date: 2/15/2022  Diagnosis: Cervical stenosis of spine [M48.02] <principal problem not specified>  Procedure(s) (LRB):  C5-6, C6-7 ANTERIOR CERVICAL DISCECTOMY WITH FUSION (FAST TRACK) (N/A) 4 Days Post-Op  Precautions: Fall (cervical)  Chart, physical therapy assessment, plan of care and goals were reviewed. ASSESSMENT  Patient continues with skilled PT services and is progressing towards goals. Patient is received supine in bed. She demonstrates improved log roll technique for supine to sit. Patient reports 8/10 pain in the posterior cervical spine. She demonstrates improved balance and mobility with use of RW. She also able to ambulate increased distance. She demonstrates once incidence of LOB when turning with RW to the bed and to enter the room. Patient requests to return to bed due to pain.      Current Level of Function Impacting Discharge (mobility/balance): Min a for balance and use of Rw     Other factors to consider for discharge: medical stability, decreased balance, increased risk for falls          PLAN :  Patient continues to benefit from skilled intervention to address the above impairments. Continue treatment per established plan of care. to address goals. Recommendation for discharge: (in order for the patient to meet his/her long term goals)  Physical therapy at least 2 days/week in the home AND ensure assist and/or supervision for safety with functional mobiltiy     This discharge recommendation:  Has been made in collaboration with the attending provider and/or case management    IF patient discharges home will need the following DME: RW orders placed 12/15/22       SUBJECTIVE:   Patient stated the pain is just excruciating.     OBJECTIVE DATA SUMMARY:   Critical Behavior:  Neurologic State: Alert  Orientation Level: Oriented X4  Cognition: Follows commands  Safety/Judgement: Awareness of environment  Functional Mobility Training:  Bed Mobility:     Supine to Sit: Stand-by assistance  Sit to Supine: Stand-by assistance  Scooting: Stand-by assistance        Transfers:  Sit to Stand: Contact guard assistance  Stand to Sit: Contact guard assistance                             Balance:  Sitting: Intact  Standing: Impaired; With support  Standing - Static: Constant support; Fair  Standing - Dynamic : Constant support; Fair  Ambulation/Gait Training:  Distance (ft): 120 Feet (ft)  Assistive Device: Gait belt;Walker, rolling  Ambulation - Level of Assistance: Minimal assistance        Gait Abnormalities: Decreased step clearance        Base of Support: Narrowed; Center of gravity altered     Speed/Natalie: Slow                       Stairs:               Therapeutic Exercises:     Pain Rating:      Activity Tolerance:   Fair    After treatment patient left in no apparent distress:   Supine in bed, Call bell within reach, Bed / chair alarm activated, and Side rails x 3    COMMUNICATION/COLLABORATION:   The patients plan of care was discussed with: Registered nurse.      Molly Vanessa, PT   Time Calculation: 20 mins

## 2022-02-15 NOTE — PROGRESS NOTES
Spine Surgery Progress Note    Admit Date: 2/11/2022   LOS: 0 days      Daily Progress Note: 2/15/2022    POD:4 Days Post-Op    S/P: Procedure(s):  C5-6, C6-7 ANTERIOR CERVICAL DISCECTOMY WITH FUSION (FAST TRACK)    Visit Vitals  /73 (BP 1 Location: Right arm, BP Patient Position: At rest)   Pulse (!) 117   Temp 98.6 °F (37 °C)   Resp 16   Ht 5' 6\" (1.676 m)   Wt 86.2 kg (190 lb)   SpO2 97%   BMI 30.67 kg/m²      Lab Results   Component Value Date/Time    HGB 9.8 (L) 02/12/2022 11:53 AM    INR 1.0 02/04/2022 03:14 PM     Patient doing well overall. No nausea or vomiting. Complaints of pain although patient seems confused and over-medicated in my opinion. Progressing slowly with PT/OT; unsteady gait noted  Swallowing without issue. Voiding without issue. No BM yet but +flatus  Denies nausea, vomiting, fever, chills, chest pain, dyspnea, headache. Calves soft/NTTP Bilaterally. Moving UE & LE well bilat. Neurocirculatory exam WNL. Motor and sensation intact. Incision OK; no drainage. Dressing clean and dry. Plan:  -Daily dressing changes to incision  -PT/OT - in soft collar  -Use narcotics sparingly; recommend tylenol, gabapentin, tramadol, flexeril. Limit oxycodone. -D/c [place]  -CM for discharge planning  -Monitor vitals  -Labs tomorrow AM    Discharge pending. All questions were answered. Follow up in Dr. Wall Divers office in 2 weeks, sooner if needed.     Salena Blizzard, PA

## 2022-02-15 NOTE — PROGRESS NOTES
Problem: Mobility Impaired (Adult and Pediatric)  Goal: *Acute Goals and Plan of Care (Insert Text)  Description: FUNCTIONAL STATUS PRIOR TO ADMISSION: Pt reports indep LOF with mobility and ADLs. Notes difficulty with stair negotiation due to WHEELER/ COPD    HOME SUPPORT PRIOR TO ADMISSION: The patient lived with  but did not require assist.    Physical Therapy Goals  Initiated 2/12/2022  1. Patient will move from supine to sit and sit to supine  in bed with modified independence within 7 day(s). 2.  Patient will transfer from bed to chair and chair to bed with modified independence using the least restrictive device within 7 day(s). 3.  Patient will perform sit to stand with modified independence within 7 day(s). 4.  Patient will ambulate with supervision/set-up for 150 feet with the least restrictive device within 7 day(s). 5.  Patient will ascend/descend at least 4 stairs with bilat handrail(s) with supervision/set-up within 7 day(s). Outcome: Progressing Towards Goal   PHYSICAL THERAPY TREATMENT  Patient: Claudio Dahl (49 y.o. female)  Date: 2/15/2022  Diagnosis: Cervical stenosis of spine [M48.02] <principal problem not specified>  Procedure(s) (LRB):  C5-6, C6-7 ANTERIOR CERVICAL DISCECTOMY WITH FUSION (FAST TRACK) (N/A) 4 Days Post-Op  Precautions: Fall (cervical)  Chart, physical therapy assessment, plan of care and goals were reviewed. ASSESSMENT  Patient continues with skilled PT services and is progressing towards goals. She continues to report feeling \"out of it\" and reports a hx of issues with oxycodone. She reports pain 6/10 and excruciating. VC are provided to patient to use recliner features to raise her trunk to 0* instead of pulling herself forward with use of trunk and cervical muscles. She demonstrates decreased standing balance, unable to maintain safety without at least CGA. Patient ambulates a short distance with use of RW.  She demonstrates improved coordination and balance with use of RW v. the previous day where no improvement is seen with use of SPC. Current Level of Function Impacting Discharge (mobility/balance): Min A for safety and balance with use of RW     Other factors to consider for discharge: medical stability, increased risk for falls, decreased balance         PLAN :  Patient continues to benefit from skilled intervention to address the above impairments. Continue treatment per established plan of care. to address goals. Recommendation for discharge: (in order for the patient to meet his/her long term goals)  Physical therapy at least 2 days/week in the home AND ensure assist and/or supervision for safety with bed mobility     This discharge recommendation:  Has been made in collaboration with the attending provider and/or case management    IF patient discharges home will need the following DME: to be determined (TBD)- RW       SUBJECTIVE:   Patient stated I think its the medicine, I feel out of it.     OBJECTIVE DATA SUMMARY:   Critical Behavior:  Neurologic State: Alert  Orientation Level: Oriented X4  Cognition: Follows commands  Safety/Judgement: Awareness of environment  Functional Mobility Training:  Bed Mobility:                    Transfers:  Sit to Stand: Contact guard assistance  Stand to Sit: Contact guard assistance                             Balance:  Sitting: Intact  Standing: Impaired; With support  Standing - Static: Constant support; Fair  Standing - Dynamic : Constant support; Fair  Ambulation/Gait Training:  Distance (ft): 90 Feet (ft)  Assistive Device: Gait belt;Walker, rolling  Ambulation - Level of Assistance: Minimal assistance        Gait Abnormalities: Decreased step clearance        Base of Support: Narrowed     Speed/Natalie: Slow                       Stairs:               Therapeutic Exercises:     Pain Rating:      Activity Tolerance:   Fair    After treatment patient left in no apparent distress:   Supine in bed, Call bell within reach, Bed / chair alarm activated, and Side rails x 3    COMMUNICATION/COLLABORATION:   The patients plan of care was discussed with: Registered nurse.      Bobo Macias, PT   Time Calculation: 18 mins

## 2022-02-15 NOTE — PROGRESS NOTES
Orthopedic Spine Progress Note  Post Op day: 4 Days Post-Op    February 15, 2022 9:13 AM   Admit Date: 2022  Procedure: Procedure(s):  C5-6, C6-7 ANTERIOR CERVICAL DISCECTOMY WITH FUSION (FAST TRACK)    Subjective:     Dyer Or has complaints of posterior neck tightness. Continues to deny upper extremity radiculopathy. Tolerating diet. No N/V. Pain Control:   Pain Assessment  Pain Scale 1: Numeric (0 - 10)  Pain Intensity 1: 5  Pain Onset 1: posotp  Pain Location 1: Neck  Pain Orientation 1: Posterior  Pain Description 1: Aching  Pain Intervention(s) 1: Medication (see MAR)    Objective:          Physical Exam:  General:  Alert and oriented. No acute distress. Heart:  Respirations unlabored. Abdomen:   Extremities: Soft, non-tender. No evidence of cyanosis. Pulses palpable in both upper and lower extremities. Neurologic:  Musculoskeletal:  No new motor deficits. Neurovascular exam within normal limits. Sensation stable. Motor: unchanged C5-T1 and L2-S1. Elisa's sign negative in bilateral lower extremities. Calves soft, nontender upon palpation and with passive twitch. Moves both upper and lower extremities. Incision: clean, dry, and intact. No significant erythema or swelling. No active drainage noted. Vital Signs:    Blood pressure 111/73, pulse (!) 117, temperature 98.6 °F (37 °C), resp. rate 16, height 5' 6\" (1.676 m), weight 190 lb (86.2 kg), SpO2 97 %.   Temp (24hrs), Av.5 °F (36.9 °C), Min:98.1 °F (36.7 °C), Max:98.9 °F (37.2 °C)      LAB:    Recent Labs     22  1153   HCT 30.7*   HGB 9.8*     Lab Results   Component Value Date/Time    Sodium 138 2022 03:14 PM    Potassium 4.5 2022 03:14 PM    Chloride 106 2022 03:14 PM    CO2 27 2022 03:14 PM    Glucose 94 2022 03:14 PM    BUN 26 (H) 2022 03:14 PM    Creatinine 1.46 (H) 2022 03:14 PM    Calcium 9.3 2022 03:14 PM       Intake/Output:No intake/output data recorded. No intake/output data recorded. PT/OT:   Gait:  Gait  Base of Support: Narrowed  Speed/Natalie: Accelerated  Step Length: Right shortened,Left shortened  Gait Abnormalities: Scissoring,Path deviations,Trunk sway increased,Decreased step clearance  Ambulation - Level of Assistance: Minimal assistance  Distance (ft): 120 Feet (ft)  Assistive Device: Gait belt,Cane, straight  Rail Use: Both  Stairs - Level of Assistance: Contact guard assistance,Minimum assistance  Number of Stairs Trained: 4                 Assessment:   Patient is 4 Days Post-Op s/p Procedure(s):  C5-6, C6-7 ANTERIOR CERVICAL DISCECTOMY WITH FUSION (FAST TRACK)    Plan:     1. Continue PT/OT  2. Continue established methods of pain control  3. VTE Prophylaxes - TEDS &/or SCDs   4. Advance diet  5.   Discharge pending clearance from PT; likely today      Signed By: JUAN Reynolds

## 2022-02-16 VITALS
WEIGHT: 190 LBS | OXYGEN SATURATION: 91 % | TEMPERATURE: 97.8 F | DIASTOLIC BLOOD PRESSURE: 70 MMHG | BODY MASS INDEX: 30.53 KG/M2 | SYSTOLIC BLOOD PRESSURE: 102 MMHG | HEART RATE: 83 BPM | HEIGHT: 66 IN | RESPIRATION RATE: 16 BRPM

## 2022-02-16 DIAGNOSIS — M48.02 CERVICAL STENOSIS OF SPINAL CANAL: ICD-10-CM

## 2022-02-16 DIAGNOSIS — M54.2 NECK PAIN: Primary | ICD-10-CM

## 2022-02-16 LAB
ANION GAP SERPL CALC-SCNC: 4 MMOL/L (ref 5–15)
BUN SERPL-MCNC: 38 MG/DL (ref 6–20)
BUN/CREAT SERPL: 24 (ref 12–20)
CALCIUM SERPL-MCNC: 9.7 MG/DL (ref 8.5–10.1)
CHLORIDE SERPL-SCNC: 103 MMOL/L (ref 97–108)
CO2 SERPL-SCNC: 27 MMOL/L (ref 21–32)
CREAT SERPL-MCNC: 1.6 MG/DL (ref 0.55–1.02)
ERYTHROCYTE [DISTWIDTH] IN BLOOD BY AUTOMATED COUNT: 14.1 % (ref 11.5–14.5)
GLUCOSE SERPL-MCNC: 122 MG/DL (ref 65–100)
HCT VFR BLD AUTO: 29.8 % (ref 35–47)
HGB BLD-MCNC: 9.4 G/DL (ref 11.5–16)
MCH RBC QN AUTO: 29 PG (ref 26–34)
MCHC RBC AUTO-ENTMCNC: 31.5 G/DL (ref 30–36.5)
MCV RBC AUTO: 92 FL (ref 80–99)
NRBC # BLD: 0 K/UL (ref 0–0.01)
NRBC BLD-RTO: 0 PER 100 WBC
PLATELET # BLD AUTO: 253 K/UL (ref 150–400)
PMV BLD AUTO: 11.3 FL (ref 8.9–12.9)
POTASSIUM SERPL-SCNC: 4.4 MMOL/L (ref 3.5–5.1)
RBC # BLD AUTO: 3.24 M/UL (ref 3.8–5.2)
SODIUM SERPL-SCNC: 134 MMOL/L (ref 136–145)
WBC # BLD AUTO: 7.8 K/UL (ref 3.6–11)

## 2022-02-16 PROCEDURE — 97116 GAIT TRAINING THERAPY: CPT

## 2022-02-16 PROCEDURE — 74011000250 HC RX REV CODE- 250: Performed by: STUDENT IN AN ORGANIZED HEALTH CARE EDUCATION/TRAINING PROGRAM

## 2022-02-16 PROCEDURE — 80048 BASIC METABOLIC PNL TOTAL CA: CPT

## 2022-02-16 PROCEDURE — 85027 COMPLETE CBC AUTOMATED: CPT

## 2022-02-16 PROCEDURE — 74011250637 HC RX REV CODE- 250/637: Performed by: STUDENT IN AN ORGANIZED HEALTH CARE EDUCATION/TRAINING PROGRAM

## 2022-02-16 PROCEDURE — 36415 COLL VENOUS BLD VENIPUNCTURE: CPT

## 2022-02-16 PROCEDURE — 74011250637 HC RX REV CODE- 250/637: Performed by: PHYSICIAN ASSISTANT

## 2022-02-16 RX ORDER — OXYCODONE HYDROCHLORIDE 5 MG/1
5 TABLET ORAL
Qty: 60 TABLET | Refills: 0 | Status: SHIPPED | OUTPATIENT
Start: 2022-02-16 | End: 2022-03-02

## 2022-02-16 RX ORDER — GABAPENTIN 300 MG/1
300 CAPSULE ORAL 2 TIMES DAILY
Qty: 60 CAPSULE | Refills: 3 | Status: SHIPPED | OUTPATIENT
Start: 2022-02-16 | End: 2022-03-31 | Stop reason: SDUPTHER

## 2022-02-16 RX ADMIN — SENNOSIDES AND DOCUSATE SODIUM 1 TABLET: 50; 8.6 TABLET ORAL at 08:22

## 2022-02-16 RX ADMIN — GABAPENTIN 300 MG: 300 CAPSULE ORAL at 08:22

## 2022-02-16 RX ADMIN — CETIRIZINE HYDROCHLORIDE 10 MG: 10 TABLET, FILM COATED ORAL at 08:22

## 2022-02-16 RX ADMIN — CYCLOBENZAPRINE 10 MG: 10 TABLET, FILM COATED ORAL at 06:49

## 2022-02-16 RX ADMIN — PANTOPRAZOLE SODIUM 40 MG: 40 TABLET, DELAYED RELEASE ORAL at 06:49

## 2022-02-16 RX ADMIN — ACETAMINOPHEN 1000 MG: 500 TABLET ORAL at 04:02

## 2022-02-16 RX ADMIN — SODIUM CHLORIDE, PRESERVATIVE FREE 10 ML: 5 INJECTION INTRAVENOUS at 04:03

## 2022-02-16 RX ADMIN — POLYETHYLENE GLYCOL 3350 17 G: 17 POWDER, FOR SOLUTION ORAL at 08:22

## 2022-02-16 RX ADMIN — ACETAMINOPHEN 1000 MG: 500 TABLET ORAL at 10:53

## 2022-02-16 RX ADMIN — MONTELUKAST 10 MG: 10 TABLET, FILM COATED ORAL at 08:22

## 2022-02-16 RX ADMIN — Medication 1 TABLET: at 08:22

## 2022-02-16 NOTE — PROGRESS NOTES
Orthopedic Spine Progress Note  Post Op day: 5 Days Post-Op    2022 8:16 AM   Admit Date: 2022  Procedure: Procedure(s):  C5-6, C6-7 ANTERIOR CERVICAL DISCECTOMY WITH FUSION (FAST TRACK)    Subjective:     Ravi Brown has complaints of neck pain this morning but no arm pain. Tolerating diet. No N/V. Pain Control:   Pain Assessment  Pain Scale 1: Numeric (0 - 10)  Pain Intensity 1: 5  Pain Onset 1: posotp  Pain Location 1: Neck  Pain Orientation 1: Posterior  Pain Description 1: Aching  Pain Intervention(s) 1: Medication (see MAR)    Objective:          Physical Exam:  General:  Alert and oriented. No acute distress. Heart:  Respirations unlabored. Abdomen:   Extremities: Soft, non-tender. No evidence of cyanosis. Pulses palpable in both upper and lower extremities. Neurologic:  Musculoskeletal:  No new motor deficits. Neurovascular exam within normal limits. Sensation stable. Motor: unchanged C5-T1 and L2-S1. Elisa's sign negative in bilateral lower extremities. Calves soft, nontender upon palpation and with passive twitch. Moves both upper and lower extremities. Incision: clean, dry, and intact. No significant erythema or swelling. No active drainage noted. Vital Signs:    Blood pressure 101/66, pulse 82, temperature 97.4 °F (36.3 °C), resp. rate 16, height 5' 6\" (1.676 m), weight 190 lb (86.2 kg), SpO2 90 %. Temp (24hrs), Av °F (36.7 °C), Min:97.4 °F (36.3 °C), Max:98.6 °F (37 °C)      LAB:    Recent Labs     22  0408   HCT 29.8*   HGB 9.4*        Lab Results   Component Value Date/Time    Sodium 134 (L) 2022 04:08 AM    Potassium 4.4 2022 04:08 AM    Chloride 103 2022 04:08 AM    CO2 27 2022 04:08 AM    Glucose 122 (H) 2022 04:08 AM    BUN 38 (H) 2022 04:08 AM    Creatinine 1.60 (H) 2022 04:08 AM    Calcium 9.7 2022 04:08 AM       Intake/Output:No intake/output data recorded.   No intake/output data recorded. PT/OT:   Gait:  Gait  Base of Support: Darroll Sack of gravity altered  Speed/Natalie: Slow  Step Length: Right shortened,Left shortened  Gait Abnormalities: Decreased step clearance  Ambulation - Level of Assistance: Minimal assistance  Distance (ft): 120 Feet (ft)  Assistive Device: Gait belt,Walker, rolling  Rail Use: Both  Stairs - Level of Assistance: Contact guard assistance,Minimum assistance  Number of Stairs Trained: 4                 Assessment:   Patient is 5 Days Post-Op s/p Procedure(s):  C5-6, C6-7 ANTERIOR CERVICAL DISCECTOMY WITH FUSION (FAST TRACK)    Plan:     1. Continue PT/OT  2. Continue established methods of pain control  3. VTE Prophylaxes - TEDS &/or SCDs   4. Discharge to home this morning.    5.  6.       Signed By: JUAN Quiroz

## 2022-02-16 NOTE — PROGRESS NOTES
Problem: Falls - Risk of  Goal: *Absence of Falls  Description: Document [place] Fall Risk and appropriate interventions in the flowsheet.   Outcome: Progressing Towards Goal  Note: Fall Risk Interventions:  Mobility Interventions: Patient to call before getting OOB         Medication Interventions: Patient to call before getting OOB    Elimination Interventions: Patient to call for help with toileting needs              Problem: Patient Education: Go to Patient Education Activity  Goal: Patient/Family Education  Outcome: Progressing Towards Goal     Problem: Patient Education: Go to Patient Education Activity  Goal: Patient/Family Education  Outcome: Progressing Towards Goal

## 2022-02-16 NOTE — NURSE NAVIGATOR
IV removed. Discharge instructions reviewed. All questions addressed. Provided dressing change supplies and MING stockings. Assisted into car for transport home with family.

## 2022-02-16 NOTE — PROGRESS NOTES
Problem: Falls - Risk of  Goal: *Absence of Falls  Description: Document [place] Fall Risk and appropriate interventions in the flowsheet.   2/16/2022 1109 by Zulma Horta RN  Outcome: Resolved/Met  Note: Fall Risk Interventions:  Mobility Interventions: Patient to call before getting OOB         Medication Interventions: Patient to call before getting OOB    Elimination Interventions: Patient to call for help with toileting needs           2/16/2022 0904 by Zulma Horta, RN  Outcome: Progressing Towards Goal  Note: Fall Risk Interventions:  Mobility Interventions: Patient to call before getting OOB         Medication Interventions: Patient to call before getting OOB    Elimination Interventions: Patient to call for help with toileting needs              Problem: Patient Education: Go to Patient Education Activity  Goal: Patient/Family Education  2/16/2022 1109 by Zulma Horta RN  Outcome: Resolved/Met  2/16/2022 0904 by Zulma Horta, RN  Outcome: Progressing Towards Goal     Problem: Patient Education: Go to Patient Education Activity  Goal: Patient/Family Education  2/16/2022 1109 by Zulma Horta, RN  Outcome: Resolved/Met  2/16/2022 0904 by Zulma Horta, RN  Outcome: Progressing Towards Goal

## 2022-02-16 NOTE — PROGRESS NOTES
Chart reviewed. Noted order for rolling walker placed yesterday evening. Noted discharge orders. CM met with patient at bedside. CM gave patient options for obtaining rolling walker. Patient would like CM to order RW through her insurance to be delivered to the hospital. CM sent order to [employer] via [employer]. Rolling walker delivered to bedside. CM uploaded delivery ticket for [place] via [employer]    [facility] has accepted patient for home health. CM notified [facility] with [facility] of discharge today.     Kareen Morillo, HARISHW/CRM

## 2022-02-16 NOTE — PROGRESS NOTES
Problem: Mobility Impaired (Adult and Pediatric)  Goal: *Acute Goals and Plan of Care (Insert Text)  Description: FUNCTIONAL STATUS PRIOR TO ADMISSION: Pt reports indep LOF with mobility and ADLs. Notes difficulty with stair negotiation due to WHEELER/ COPD    HOME SUPPORT PRIOR TO ADMISSION: The patient lived with  but did not require assist.    Physical Therapy Goals  Initiated 2/12/2022  1. Patient will move from supine to sit and sit to supine  in bed with modified independence within 7 day(s). 2.  Patient will transfer from bed to chair and chair to bed with modified independence using the least restrictive device within 7 day(s). 3.  Patient will perform sit to stand with modified independence within 7 day(s). 4.  Patient will ambulate with supervision/set-up for 150 feet with the least restrictive device within 7 day(s). 5.  Patient will ascend/descend at least 4 stairs with bilat handrail(s) with supervision/set-up within 7 day(s). Outcome: Progressing Towards Goal   PHYSICAL THERAPY TREATMENT  Patient: Martin Cruz (87 y.o. female)  Date: 2/16/2022  Diagnosis: Cervical stenosis of spine [M48.02] <principal problem not specified>  Procedure(s) (LRB):  C5-6, C6-7 ANTERIOR CERVICAL DISCECTOMY WITH FUSION (FAST TRACK) (N/A) 5 Days Post-Op  Precautions: Spinal  Chart, physical therapy assessment, plan of care and goals were reviewed. ASSESSMENT  Patient continues with skilled PT services and is progressing towards goals. Patient up in chair, demonstrates improved safety with transfers and maintaining spinal precautions but still needs occasional cues, especially with distractions in hallway. Practiced toilet transfers and using RW during handwashing and simulated dressing. Demonstrates adequate safety but advised patient to have  assist her more closely at home until HHPT has come out to evaluate her in her home environment. Patient cleared for d/c home from mobility standpoint. Current Level of Function Impacting Discharge (mobility/balance): SBA, occasional cues     Other factors to consider for discharge: supportive          PLAN :  Patient continues to benefit from skilled intervention to address the above impairments. Continue treatment per established plan of care. to address goals. Recommendation for discharge: (in order for the patient to meet his/her long term goals)  Physical therapy at least 2 days/week in the home AND ensure assist and/or supervision for safety with mobility and self care    This discharge recommendation:  Has been made in collaboration with the attending provider and/or case management    IF patient discharges home will need the following DME: RW has been delivered for discharge       SUBJECTIVE:   Patient stated Mani Moses can I go home.     OBJECTIVE DATA SUMMARY:   Critical Behavior:  Neurologic State: Alert  Orientation Level: Oriented X4  Cognition: Follows commands  Safety/Judgement: Awareness of environment  Functional Mobility Training:  Bed Mobility:     Supine to Sit:  (received in chair)              Transfers:  Sit to Stand: Stand-by assistance  Stand to Sit: Stand-by assistance                             Balance:  Sitting: Intact  Standing: Impaired  Standing - Static: Good;Constant support  Standing - Dynamic : Fair;Constant support  Ambulation/Gait Training:  Distance (ft): 200 Feet (ft)  Assistive Device: Gait belt;Walker, rolling  Ambulation - Level of Assistance: Stand-by assistance        Gait Abnormalities: Decreased step clearance        Base of Support: Narrowed     Speed/Natalei: Pace decreased (<100 feet/min)  Step Length: Right shortened;Left shortened                    Stairs:  Number of Stairs Trained: 4  Stairs - Level of Assistance: Contact guard assistance   Rail Use: Both      Pain Ratin/10 in neck    Activity Tolerance:   Good    After treatment patient left in no apparent distress:   Sitting in chair, Call bell within reach, and Bed / chair alarm activated    COMMUNICATION/COLLABORATION:   The patients plan of care was discussed with: Registered nurse.      Jamin Sosa, PT   Time Calculation: 18 mins

## 2022-02-17 ENCOUNTER — TELEPHONE (OUTPATIENT)
Dept: ORTHOPEDIC SURGERY | Age: 67
End: 2022-02-17

## 2022-02-17 ENCOUNTER — HOME CARE VISIT (OUTPATIENT)
Dept: SCHEDULING | Facility: HOME HEALTH | Age: 67
End: 2022-02-17
Payer: MEDICARE

## 2022-02-17 DIAGNOSIS — M48.02 CERVICAL STENOSIS OF SPINAL CANAL: ICD-10-CM

## 2022-02-17 DIAGNOSIS — M48.02 CERVICAL STENOSIS OF SPINE: Primary | ICD-10-CM

## 2022-02-17 PROCEDURE — 3331090002 HH PPS REVENUE DEBIT

## 2022-02-17 PROCEDURE — 400013 HH SOC

## 2022-02-17 PROCEDURE — 3331090001 HH PPS REVENUE CREDIT

## 2022-02-17 PROCEDURE — G0151 HHCP-SERV OF PT,EA 15 MIN: HCPCS

## 2022-02-17 PROCEDURE — 400018 HH-NO PAY CLAIM PROCEDURE

## 2022-02-17 RX ORDER — CYCLOBENZAPRINE HCL 10 MG
10 TABLET ORAL
Qty: 60 TABLET | Refills: 0 | Status: SHIPPED | OUTPATIENT
Start: 2022-02-17 | End: 2022-03-24 | Stop reason: ALTCHOICE

## 2022-02-18 PROCEDURE — 3331090001 HH PPS REVENUE CREDIT

## 2022-02-18 PROCEDURE — 3331090002 HH PPS REVENUE DEBIT

## 2022-02-19 PROCEDURE — 3331090002 HH PPS REVENUE DEBIT

## 2022-02-19 PROCEDURE — 3331090001 HH PPS REVENUE CREDIT

## 2022-02-20 PROCEDURE — 3331090001 HH PPS REVENUE CREDIT

## 2022-02-20 PROCEDURE — 3331090002 HH PPS REVENUE DEBIT

## 2022-02-21 ENCOUNTER — HOME CARE VISIT (OUTPATIENT)
Dept: SCHEDULING | Facility: HOME HEALTH | Age: 67
End: 2022-02-21
Payer: MEDICARE

## 2022-02-21 PROCEDURE — G0152 HHCP-SERV OF OT,EA 15 MIN: HCPCS

## 2022-02-21 PROCEDURE — 3331090002 HH PPS REVENUE DEBIT

## 2022-02-21 PROCEDURE — 3331090001 HH PPS REVENUE CREDIT

## 2022-02-22 ENCOUNTER — HOME CARE VISIT (OUTPATIENT)
Dept: SCHEDULING | Facility: HOME HEALTH | Age: 67
End: 2022-02-22
Payer: MEDICARE

## 2022-02-22 VITALS
DIASTOLIC BLOOD PRESSURE: 80 MMHG | RESPIRATION RATE: 18 BRPM | OXYGEN SATURATION: 99 % | HEART RATE: 77 BPM | TEMPERATURE: 98.7 F | SYSTOLIC BLOOD PRESSURE: 118 MMHG

## 2022-02-22 VITALS
OXYGEN SATURATION: 98 % | TEMPERATURE: 98.1 F | RESPIRATION RATE: 17 BRPM | DIASTOLIC BLOOD PRESSURE: 67 MMHG | SYSTOLIC BLOOD PRESSURE: 106 MMHG | HEART RATE: 89 BPM

## 2022-02-22 PROCEDURE — G0151 HHCP-SERV OF PT,EA 15 MIN: HCPCS

## 2022-02-22 PROCEDURE — 3331090001 HH PPS REVENUE CREDIT

## 2022-02-22 PROCEDURE — 3331090002 HH PPS REVENUE DEBIT

## 2022-02-23 PROCEDURE — 3331090001 HH PPS REVENUE CREDIT

## 2022-02-23 PROCEDURE — 3331090002 HH PPS REVENUE DEBIT

## 2022-02-24 ENCOUNTER — OFFICE VISIT (OUTPATIENT)
Dept: ORTHOPEDIC SURGERY | Age: 67
End: 2022-02-24
Payer: MEDICARE

## 2022-02-24 VITALS — HEIGHT: 66 IN | WEIGHT: 180 LBS | BODY MASS INDEX: 28.93 KG/M2

## 2022-02-24 DIAGNOSIS — Z98.1 S/P CERVICAL SPINAL FUSION: Primary | ICD-10-CM

## 2022-02-24 PROCEDURE — 3331090001 HH PPS REVENUE CREDIT

## 2022-02-24 PROCEDURE — 3331090002 HH PPS REVENUE DEBIT

## 2022-02-24 PROCEDURE — 99024 POSTOP FOLLOW-UP VISIT: CPT | Performed by: PHYSICIAN ASSISTANT

## 2022-02-24 NOTE — PROGRESS NOTES
1. Have you been to the ER, urgent care clinic since your last visit? Hospitalized since your last visit? No    2. Have you seen or consulted any other health care providers outside of the [address] [place] [address] since your last visit? Include any pap smears or colon screening.  No    Chief Complaint   Patient presents with    Surgical Follow-up     Sx 2/11/2022 C5/7 ACDF (PO#1)

## 2022-02-25 ENCOUNTER — HOME CARE VISIT (OUTPATIENT)
Dept: SCHEDULING | Facility: HOME HEALTH | Age: 67
End: 2022-02-25
Payer: MEDICARE

## 2022-02-25 PROCEDURE — 3331090002 HH PPS REVENUE DEBIT

## 2022-02-25 PROCEDURE — G0151 HHCP-SERV OF PT,EA 15 MIN: HCPCS

## 2022-02-25 PROCEDURE — 3331090003 HH PPS REVENUE ADJ

## 2022-02-25 PROCEDURE — 3331090001 HH PPS REVENUE CREDIT

## 2022-02-26 PROCEDURE — 3331090002 HH PPS REVENUE DEBIT

## 2022-02-26 PROCEDURE — 3331090001 HH PPS REVENUE CREDIT

## 2022-02-27 VITALS
RESPIRATION RATE: 16 BRPM | TEMPERATURE: 98.6 F | DIASTOLIC BLOOD PRESSURE: 67 MMHG | OXYGEN SATURATION: 97 % | HEART RATE: 70 BPM | SYSTOLIC BLOOD PRESSURE: 120 MMHG

## 2022-02-27 VITALS
HEART RATE: 70 BPM | SYSTOLIC BLOOD PRESSURE: 112 MMHG | RESPIRATION RATE: 17 BRPM | OXYGEN SATURATION: 98 % | TEMPERATURE: 98.1 F | DIASTOLIC BLOOD PRESSURE: 70 MMHG

## 2022-02-27 PROCEDURE — 3331090001 HH PPS REVENUE CREDIT

## 2022-02-27 PROCEDURE — 3331090002 HH PPS REVENUE DEBIT

## 2022-02-28 PROCEDURE — 3331090001 HH PPS REVENUE CREDIT

## 2022-02-28 PROCEDURE — 3331090002 HH PPS REVENUE DEBIT

## 2022-02-28 NOTE — PROGRESS NOTES
Andrew Sherwood (: 1955) is a 77 y.o. female patient here for evaluation of the following chief complaint(s):  Surgical Follow-up (Sx 2022 C5/7 ACDF (PO#1))         ASSESSMENT/PLAN:  Below is the assessment and plan developed based on review of pertinent history, physical exam, labs, studies, and medications. 1. S/P cervical spinal fusion  -     XR SPINE CERV PA LAT ODONT 3 V MAX; Future      The patient's radiologic findings have been reviewed with her in detail today. She is doing quite well at this point her postoperative recovery. She will continue wearing her soft collar, and continue limit her cervical motion lifting. She has been counseled on the importance of continuing to wean narcotic pain medications as tolerated. We will see her back for next postoperative follow-up visit in 4 weeks. Return in about 4 weeks (around 3/24/2022) for Post op follow up. SUBJECTIVE/OBJECTIVE:  Andrew Sherwood (: 1955) is a 77 y.o. female who presents today for the following:  Chief Complaint   Patient presents with    Surgical Follow-up     Sx 2022 C5/7 ACDF (PO#1)        HPI   Ms. Keith Rosen returns today for a routine postoperative follow-up visit. She is approximately 2 weeks out from her recent cervical fusion. She has expected posterior neck soreness and discomfort along with intermittent spasms. She is tolerating her soft collar well and denies any incisional difficulties. She has no significant residual arm symptoms. She has been using oxycodone 5 mg twice per day as well as Tylenol and Flexeril. She does not need any refills at this time. IMAGING:  XR Results (most recent):  Results from Appointment encounter on 22    XR SPINE CERV PA LAT ODONT 3 V MAX    Narrative  AP and lateral films of the cervical spine reveal a stable cervical fusion from C5-C7 with stable instrumentation.        MRI Results (most recent):  Results from Abstract encounter on 22    MRI CERV SPINE WO CONT       Allergies   Allergen Reactions    Amlodipine Vertigo    Ace Inhibitors Cough    Codeine Itching       Current Outpatient Medications   Medication Sig    cyclobenzaprine (FLEXERIL) 10 mg tablet Take 1 Tablet by mouth three (3) times daily as needed for Muscle Spasm(s).  oxyCODONE IR (ROXICODONE) 5 mg immediate release tablet Take 1 Tablet by mouth every four (4) hours as needed for Pain for up to 14 days. Max Daily Amount: 30 mg.    gabapentin (NEURONTIN) 300 mg capsule Take 1 Capsule by mouth two (2) times a day. Max Daily Amount: 600 mg.    naloxone (Narcan) 4 mg/actuation nasal spray Use 1 spray intranasally, then discard. Repeat with new spray every 2 min as needed for opioid overdose symptoms, alternating nostrils.  omeprazole (PRILOSEC) 20 mg capsule Take 20 mg by mouth daily.  irbesartan (AVAPRO) 300 mg tablet Take 300 mg by mouth daily.  montelukast (SINGULAIR) 10 mg tablet Take 10 mg by mouth daily.  fexofenadine-pseudoephedrine (Allegra-D 12 Hour)  mg Tb12 Take 1 Tablet by mouth daily.  budesonide-formoteroL (Symbicort) 160-4.5 mcg/actuation HFAA Take 2 Puffs by inhalation as needed for Cough.  tiotropium (Spiriva with HandiHaler) 18 mcg inhalation capsule Take 1 Capsule by mouth daily.  rosuvastatin (CRESTOR) 10 mg tablet Take 1 Tablet by mouth nightly.  LORazepam (ATIVAN) 0.5 mg tablet Take 0.5 mg by mouth two (2) times a day.  dilTIAZem ER (CARDIZEM CD) 240 mg capsule Take 1 Capsule by mouth nightly.  gabapentin (Neurontin) 300 mg capsule Take 1 Capsule by mouth nightly. Max Daily Amount: 300 mg.    zolpidem (AMBIEN) 10 mg tablet Take 10 mg by mouth nightly as needed for Sleep.  multivitamins-minerals-lutein (CENTRUM SILVER) Tab Take  by mouth.  Calcium-Cholecalciferol, D3, (CALCIUM 600 WITH VITAMIN D3) 600 mg(1,500mg) -400 unit cap Take 1 Capsule by mouth two (2) times a day.      No current facility-administered medications for this visit. Past Medical History:   Diagnosis Date    Chronic obstructive pulmonary disease ([address]     Chronic pain     BOTH ARMS AND NECK    Depression with anxiety     GERD (gastroesophageal reflux disease)     Hypertension     no longer on meds        Past Surgical History:   Procedure Laterality Date    HX CHOLECYSTECTOMY      HX COLONOSCOPY      HX ENDOSCOPY      HX ORTHOPAEDIC  1999    LOWER BACK - HERNIATED DISC    HX ORTHOPAEDIC      RIGHT WRIST    HX ROTATOR CUFF REPAIR Left     HX TONSILLECTOMY  CHILDHOOD       Family History   Problem Relation Age of Onset    Stroke Mother     Other Mother     Heart Disease Father     Hypertension Father     Elevated Lipids Father     No Known Problems Brother     Heart Disease Maternal Grandmother     Heart Disease Maternal Grandfather     Cancer Paternal Grandmother         LEUKEMIA    No Known Problems Brother     Anesth Problems Neg Hx         Social History     Tobacco Use    Smoking status: Former Smoker     Packs/day: 1.50     Years: 30.00     Pack years: 45.00     Quit date: 2012     Years since quittin.2    Smokeless tobacco: Never Used   Substance Use Topics    Alcohol use: Yes     Comment: OCC        Review of Systems   Constitutional: Negative. Respiratory: Negative. Cardiovascular: Negative. Gastrointestinal: Negative. Endocrine: Negative. Genitourinary: Negative. Musculoskeletal: Positive for neck pain. Skin: Negative. Allergic/Immunologic: Negative. Hematological: Negative. Psychiatric/Behavioral: Negative. All other systems reviewed and are negative. No flowsheet data found. Vitals:  Ht 5' 6\" (1.676 m)   Wt 180 lb (81.6 kg)   BMI 29.05 kg/m²    Body mass index is 29.05 kg/m². Physical Exam    Integumentary  Assessment of Surgical Incision - healing and consistent with normal anticipated wound healing.     Neurologic  Overall Assessment of Muscle Strength and Tone reveals  Upper Extremities - Right Deltoid - 5/5. Left Deltoid - 5/5. Right Bicep - 5/5. Left Bicep - 5/5. Right Tricep - 5/5. Left Tricep - 5/5. Right Wrist Extensors - 5/5. Left Wrist Extensors - 5/5. Right Wrist Flexors - 5/5. Left Wrist Flexors - 5/5. Right Intrinsics - 5/5. Left Intrinsics - 5/5. General Assessment of Reflexes  Right Hand - Stewart's sign is negative in the right hand. Left Hand - Stewart's sign is negative in the left hand. Reflexes (Dermatomes)  2/2 Normal - Left Bicep (C5-6), Left Tricep (C7-8), Left Brachioradialis (C5-6), Right Bicep (C5-6), Right Tricep (C7-8) and Right Brachioradialis (C5-6). Musculoskeletal  Global Assessment  Examination of related systems reveals - well-developed, well-nourished, in no acute distress, alert and oriented x 3 and normal coordination. Gait and Station - normal gait and station and normal posture. Spine/Ribs/Pelvis  Cervical Spine - Evaluation of related systems reveals - no lymphadenopathy and neurovascularly intact bilaterally. Inspection and Palpation - Tenderness - moderate and localized. Assessment of pain reveals the following findings: - Location - cervical area. Dr. Dash Hamm was available for immediate consult during this encounter. An electronic signature was used to authenticate this note.   -- JUAN Pena

## 2022-03-18 PROBLEM — M48.02 CERVICAL STENOSIS OF SPINE: Status: ACTIVE | Noted: 2022-02-11

## 2022-03-18 PROBLEM — M50.90 CERVICAL DISC DISEASE: Status: ACTIVE | Noted: 2022-01-13

## 2022-03-19 PROBLEM — M54.2 NECK PAIN: Status: ACTIVE | Noted: 2022-01-13

## 2022-03-20 PROBLEM — M48.02 CERVICAL STENOSIS OF SPINAL CANAL: Status: ACTIVE | Noted: 2022-01-13

## 2022-03-24 ENCOUNTER — OFFICE VISIT (OUTPATIENT)
Dept: ORTHOPEDIC SURGERY | Age: 67
End: 2022-03-24
Payer: MEDICARE

## 2022-03-24 VITALS — HEIGHT: 66 IN | WEIGHT: 180 LBS | BODY MASS INDEX: 28.93 KG/M2

## 2022-03-24 DIAGNOSIS — Z98.1 S/P CERVICAL SPINAL FUSION: Primary | ICD-10-CM

## 2022-03-24 PROCEDURE — 99024 POSTOP FOLLOW-UP VISIT: CPT | Performed by: ORTHOPAEDIC SURGERY

## 2022-03-24 RX ORDER — CYCLOBENZAPRINE HCL 10 MG
10 TABLET ORAL
Qty: 40 TABLET | Refills: 0 | Status: SHIPPED
Start: 2022-03-24 | End: 2022-08-22

## 2022-03-24 NOTE — PROGRESS NOTES
1. Have you been to the ER, urgent care clinic since your last visit? Hospitalized since your last visit? No    2. Have you seen or consulted any other health care providers outside of the [address] [place] [address] since your last visit? Include any pap smears or colon screening.  No    Chief Complaint   Patient presents with    Surgical Follow-up     Sx 2/11/2022 C5/7 ACDF (PO#2)

## 2022-03-24 NOTE — LETTER
3/24/2022    Patient: Royal Mccann   YOB: 1955   Date of Visit: 3/24/2022     Danny Forrest MD  [address] [address] [place] [id number]  Via Fax: [phone]    Dear Danny Forrest MD,      Thank you for referring Ms. Aziza Tang to [facility] for evaluation. My notes for this consultation are attached. If you have questions, please do not hesitate to call me. I look forward to following your patient along with you.       Sincerely,    Octavia Simon MD

## 2022-03-24 NOTE — PATIENT INSTRUCTIONS
Neck: Exercises  Introduction  Here are some examples of exercises for you to try. The exercises may be suggested for a condition or for rehabilitation. Start each exercise slowly. Ease off the exercises if you start to have pain. You will be told when to start these exercises and which ones will work best for you. How to do the exercises  Neck stretch    1. This stretch works best if you keep your shoulder down as you lean away from it. To help you remember to do this, start by relaxing your shoulders and lightly holding on to your thighs or your chair. 2. Tilt your head toward your shoulder and hold for 15 to 30 seconds. Let the weight of your head stretch your muscles. 3. If you would like a little added stretch, use your hand to gently and steadily pull your head toward your shoulder. For example, keeping your right shoulder down, lean your head to the left. 4. Repeat 2 to 4 times toward each shoulder. Diagonal neck stretch    1. Turn your head slightly toward the direction you will be stretching, and tilt your head diagonally toward your chest and hold for 15 to 30 seconds. 2. If you would like a little added stretch, use your hand to gently and steadily pull your head forward on the diagonal.  3. Repeat 2 to 4 times toward each side. Dorsal glide stretch    The dorsal glide stretches the back of the neck. If you feel pain, do not glide so far back. Some people find this exercise easier to do while lying on their backs with an ice pack on the neck. 1. Sit or stand tall and look straight ahead. 2. Slowly tuck your chin as you glide your head backward over your body  3. Hold for a count of 6, and then relax for up to 10 seconds. 4. Repeat 8 to 12 times. Chest and shoulder stretch    1. Sit or stand tall and glide your head backward as in the dorsal glide stretch. 2. Raise both arms so that your hands are next to your ears.   3. Take a deep breath, and as you breathe out, lower your elbows down and behind your back. You will feel your shoulder blades slide down and together, and at the same time you will feel a stretch across your chest and the front of your shoulders. 4. Hold for about 6 seconds, and then relax for up to 10 seconds. 5. Repeat 8 to 12 times. Strengthening: Hands on head    1. Move your head backward, forward, and side to side against gentle pressure from your hands, holding each position for about 6 seconds. 2. Repeat 8 to 12 times. Follow-up care is a key part of your treatment and safety. Be sure to make and go to all appointments, and call your doctor if you are having problems. It's also a good idea to know your test results and keep a list of the medicines you take. Where can you learn more? Go to http://www.[facility].com/  Enter P975 in the search box to learn more about \"Neck: Exercises. \"  Current as of: July 1, 2021               Content Version: 13.2  © 2006-2022 [employer]. Care instructions adapted under license by [employer] (which disclaims liability or warranty for this information). If you have questions about a medical condition or this instruction, always ask your healthcare professional. [address] any warranty or liability for your use of this information.

## 2022-03-24 NOTE — PROGRESS NOTES
Claudio Dahl (: 1955) is a 77 y.o. female patient here for evaluation of the following chief complaint(s):  Surgical Follow-up (Sx 2022 C5/7 ACDF (PO#2))         ASSESSMENT/PLAN:  Below is the assessment and plan developed based on review of pertinent history, physical exam, labs, studies, and medications. 1. S/P cervical spinal fusion  -     XR SPINE CERV PA LAT ODONT 3 V MAX; Future      The patient's radiologic findings have been reviewed with her in detail today. She is doing quite well at this point her postoperative recovery. She will continue wearing her soft collar, and continue limit her cervical motion lifting. She has been counseled on the importance of continuing to wean narcotic pain medications as tolerated. We will see her back for next postoperative follow-up visit in 4 weeks. Return in about 6 weeks (around 2022) for Physican Assistant. SUBJECTIVE/OBJECTIVE:  Claudio Dahl (: 1955) is a 77 y.o. female who presents today for the following:  Chief Complaint   Patient presents with    Surgical Follow-up     Sx 2022 C5/7 ACDF (PO#2)        She comes in today for her 6-week follow-up. Overall doing well. She has some mild neck spasms at times but otherwise she is not taking anything other than Tylenol. Neck pain is improving. She is weaning out of her soft cervical collar. Surgical Follow-up       Ms. Patricia Gomes returns today for a routine postoperative follow-up visit. She is approximately 2 weeks out from her recent cervical fusion. She has expected posterior neck soreness and discomfort along with intermittent spasms. She is tolerating her soft collar well and denies any incisional difficulties. She has no significant residual arm symptoms. She has been using oxycodone 5 mg twice per day as well as Tylenol and Flexeril. She does not need any refills at this time.     IMAGING:  XR Results (most recent):  Results from Appointment encounter on 03/24/22    XR SPINE CERV PA LAT ODONT 3 V MAX    Narrative  AP and lateral of the cervical spine reviewed today. We have a stable cervical fusion from C5-C7. No acute changes noted. MRI Results (most recent):  Results from Abstract encounter on 02/05/22    MRI CERV SPINE WO CONT       Allergies   Allergen Reactions    Amlodipine Vertigo    Ace Inhibitors Cough    Codeine Itching       Current Outpatient Medications   Medication Sig    cyclobenzaprine (FLEXERIL) 10 mg tablet Take 1 Tablet by mouth three (3) times daily as needed for Muscle Spasm(s).  gabapentin (NEURONTIN) 300 mg capsule Take 1 Capsule by mouth two (2) times a day. Max Daily Amount: 600 mg.    naloxone (Narcan) 4 mg/actuation nasal spray Use 1 spray intranasally, then discard. Repeat with new spray every 2 min as needed for opioid overdose symptoms, alternating nostrils.  omeprazole (PRILOSEC) 20 mg capsule Take 20 mg by mouth daily.  irbesartan (AVAPRO) 300 mg tablet Take 300 mg by mouth daily.  montelukast (SINGULAIR) 10 mg tablet Take 10 mg by mouth daily.  fexofenadine-pseudoephedrine (Allegra-D 12 Hour)  mg Tb12 Take 1 Tablet by mouth daily.  budesonide-formoteroL (Symbicort) 160-4.5 mcg/actuation HFAA Take 2 Puffs by inhalation as needed for Cough.  tiotropium (Spiriva with HandiHaler) 18 mcg inhalation capsule Take 1 Capsule by mouth daily.  rosuvastatin (CRESTOR) 10 mg tablet Take 1 Tablet by mouth nightly.  LORazepam (ATIVAN) 0.5 mg tablet Take 0.5 mg by mouth two (2) times a day.  dilTIAZem ER (CARDIZEM CD) 240 mg capsule Take 1 Capsule by mouth nightly.  gabapentin (Neurontin) 300 mg capsule Take 1 Capsule by mouth nightly. Max Daily Amount: 300 mg.    zolpidem (AMBIEN) 10 mg tablet Take 10 mg by mouth nightly as needed for Sleep.  multivitamins-minerals-lutein (CENTRUM SILVER) Tab Take  by mouth.       Calcium-Cholecalciferol, D3, (CALCIUM 600 WITH VITAMIN D3) 600 mg(1,500mg) -400 unit cap Take 1 Capsule by mouth two (2) times a day. No current facility-administered medications for this visit. Past Medical History:   Diagnosis Date    Chronic obstructive pulmonary disease ([address]     Chronic pain     BOTH ARMS AND NECK    Depression with anxiety     GERD (gastroesophageal reflux disease)     Hypertension     no longer on meds        Past Surgical History:   Procedure Laterality Date    HX CHOLECYSTECTOMY      HX COLONOSCOPY      HX ENDOSCOPY      HX ORTHOPAEDIC  1999    LOWER BACK - HERNIATED DISC    HX ORTHOPAEDIC      RIGHT WRIST    HX ROTATOR CUFF REPAIR Left     HX TONSILLECTOMY  CHILDHOOD       Family History   Problem Relation Age of Onset    Stroke Mother     Other Mother     Heart Disease Father     Hypertension Father     Elevated Lipids Father     No Known Problems Brother     Heart Disease Maternal Grandmother     Heart Disease Maternal Grandfather     Cancer Paternal Grandmother         LEUKEMIA    No Known Problems Brother     Anesth Problems Neg Hx         Social History     Tobacco Use    Smoking status: Former Smoker     Packs/day: 1.50     Years: 30.00     Pack years: 45.00     Quit date: 2012     Years since quittin.2    Smokeless tobacco: Never Used   Substance Use Topics    Alcohol use: Yes     Comment: OCC        Review of Systems   Constitutional: Negative. Respiratory: Negative. Cardiovascular: Negative. Gastrointestinal: Negative. Endocrine: Negative. Genitourinary: Negative. Musculoskeletal: Positive for neck pain. Skin: Negative. Allergic/Immunologic: Negative. Hematological: Negative. Psychiatric/Behavioral: Negative. All other systems reviewed and are negative. No flowsheet data found. Vitals:  Ht 5' 6\" (1.676 m)   Wt 180 lb (81.6 kg)   BMI 29.05 kg/m²    Body mass index is 29.05 kg/m².     Physical Exam    Integumentary  Assessment of Surgical Incision - healing and consistent with normal anticipated wound healing. Neurologic  Overall Assessment of Muscle Strength and Tone reveals  Upper Extremities - Right Deltoid - 5/5. Left Deltoid - 5/5. Right Bicep - 5/5. Left Bicep - 5/5. Right Tricep - 5/5. Left Tricep - 5/5. Right Wrist Extensors - 5/5. Left Wrist Extensors - 5/5. Right Wrist Flexors - 5/5. Left Wrist Flexors - 5/5. Right Intrinsics - 5/5. Left Intrinsics - 5/5. General Assessment of Reflexes  Right Hand - Stewart's sign is negative in the right hand. Left Hand - Stewart's sign is negative in the left hand. Reflexes (Dermatomes)  2/2 Normal - Left Bicep (C5-6), Left Tricep (C7-8), Left Brachioradialis (C5-6), Right Bicep (C5-6), Right Tricep (C7-8) and Right Brachioradialis (C5-6). Musculoskeletal  Global Assessment  Examination of related systems reveals - well-developed, well-nourished, in no acute distress, alert and oriented x 3 and normal coordination. Gait and Station - normal gait and station and normal posture. Spine/Ribs/Pelvis  Cervical Spine - Evaluation of related systems reveals - no lymphadenopathy and neurovascularly intact bilaterally. Inspection and Palpation - Tenderness - moderate and localized. Assessment of pain reveals the following findings: - Location - cervical area. Dr. Maribell Lamar was available for immediate consult during this encounter. An electronic signature was used to authenticate this note.   -- JUAN Rollins

## 2022-03-30 ENCOUNTER — PATIENT MESSAGE (OUTPATIENT)
Dept: ORTHOPEDIC SURGERY | Age: 67
End: 2022-03-30

## 2022-03-30 DIAGNOSIS — M48.02 CERVICAL STENOSIS OF SPINE: ICD-10-CM

## 2022-03-30 DIAGNOSIS — Z98.1 S/P CERVICAL SPINAL FUSION: Primary | ICD-10-CM

## 2022-03-31 DIAGNOSIS — M48.02 CERVICAL STENOSIS OF SPINAL CANAL: ICD-10-CM

## 2022-03-31 DIAGNOSIS — M54.2 NECK PAIN: ICD-10-CM

## 2022-03-31 DIAGNOSIS — M50.90 CERVICAL DISC DISEASE: ICD-10-CM

## 2022-03-31 RX ORDER — GABAPENTIN 300 MG/1
300 CAPSULE ORAL 2 TIMES DAILY
Qty: 60 CAPSULE | Refills: 3 | Status: SHIPPED
Start: 2022-03-31 | End: 2022-08-22

## 2022-05-10 ENCOUNTER — OFFICE VISIT (OUTPATIENT)
Dept: ORTHOPEDIC SURGERY | Age: 67
End: 2022-05-10
Payer: MEDICARE

## 2022-05-10 VITALS — HEIGHT: 66 IN | WEIGHT: 180 LBS | BODY MASS INDEX: 28.93 KG/M2

## 2022-05-10 DIAGNOSIS — Z98.1 S/P CERVICAL SPINAL FUSION: Primary | ICD-10-CM

## 2022-05-10 PROCEDURE — 99024 POSTOP FOLLOW-UP VISIT: CPT | Performed by: PHYSICIAN ASSISTANT

## 2022-05-10 NOTE — PROGRESS NOTES
Mane Tran (: 1955) is a 79 y.o. female patient here for evaluation of the following chief complaint(s):  Surgical Follow-up (Sx 22 C5/7 ACDF (PO#3))         ASSESSMENT/PLAN:  Below is the assessment and plan developed based on review of pertinent history, physical exam, labs, studies, and medications. 1. S/P cervical spinal fusion  -     XR SPINE CERV PA LAT ODONT 3 V MAX; Future    The patient's radiologic findings have been reviewed with her in detail today. She is doing exceptionally well at this point her postoperative recovery. She will continue with outpatient physical therapy, and will continue to increase her activities as tolerated. She may continue with Tylenol on an as-needed basis. She may wean off of gabapentin as instructed. We will see her back for follow-up visit on an as-needed basis. Return if symptoms worsen or fail to improve. SUBJECTIVE/OBJECTIVE:  Mane Tran (: 1955) is a 79 y.o. female who presents today for the following:  Chief Complaint   Patient presents with    Surgical Follow-up     Sx 22 C5/7 ACDF (PO#3)        HPI          Ms. Luna Bush presents today for routine postoperative follow-up visit. She is approximately 3 months out from her recent cervical fusion. She has been working with outpatient physical therapy, and feels that her range of motion has improved since her last office visit. She is using Tylenol on an as-needed basis. She has been using gabapentin once at bedtime since prior to surgery. She denies any residual radicular symptoms. She would like to wean from this medication if possible. IMAGING:  XR Results (most recent):  Results from Appointment encounter on 05/10/22    XR SPINE CERV PA LAT ODONT 3 V MAX    Narrative  AP and lateral films of the cervical spine reveal stable cervical fusion from C5-C7 with stable instrumentation. MRI Results (most recent):  Results from Abstract encounter on 02/05/22    MRI CERV SPINE WO CONT       Allergies   Allergen Reactions    Amlodipine Vertigo    Ace Inhibitors Cough    Codeine Itching       Current Outpatient Medications   Medication Sig    gabapentin (NEURONTIN) 300 mg capsule Take 1 Capsule by mouth two (2) times a day. Max Daily Amount: 600 mg.    naloxone (Narcan) 4 mg/actuation nasal spray Use 1 spray intranasally, then discard. Repeat with new spray every 2 min as needed for opioid overdose symptoms, alternating nostrils.  omeprazole (PRILOSEC) 20 mg capsule Take 20 mg by mouth daily.  irbesartan (AVAPRO) 300 mg tablet Take 300 mg by mouth daily.  fexofenadine-pseudoephedrine (Allegra-D 12 Hour)  mg Tb12 Take 1 Tablet by mouth daily.  budesonide-formoteroL (Symbicort) 160-4.5 mcg/actuation HFAA Take 2 Puffs by inhalation as needed for Cough.  tiotropium (Spiriva with HandiHaler) 18 mcg inhalation capsule Take 1 Capsule by mouth daily.  rosuvastatin (CRESTOR) 10 mg tablet Take 1 Tablet by mouth nightly.  LORazepam (ATIVAN) 0.5 mg tablet Take 0.5 mg by mouth two (2) times a day.  dilTIAZem ER (CARDIZEM CD) 240 mg capsule Take 1 Capsule by mouth nightly.  zolpidem (AMBIEN) 10 mg tablet Take 10 mg by mouth nightly as needed for Sleep.  multivitamins-minerals-lutein (CENTRUM SILVER) Tab Take  by mouth.  Calcium-Cholecalciferol, D3, (CALCIUM 600 WITH VITAMIN D3) 600 mg(1,500mg) -400 unit cap Take 1 Capsule by mouth two (2) times a day.  cyclobenzaprine (FLEXERIL) 10 mg tablet Take 1 Tablet by mouth three (3) times daily as needed for Muscle Spasm(s).  Indications: muscle spasm    montelukast (SINGULAIR) 10 mg tablet Take 10 mg by mouth daily.  gabapentin (Neurontin) 300 mg capsule Take 1 Capsule by mouth nightly. Max Daily Amount: 300 mg. No current facility-administered medications for this visit. Past Medical History:   Diagnosis Date    Chronic obstructive pulmonary disease ([address]     Chronic pain     BOTH ARMS AND NECK    Depression with anxiety     GERD (gastroesophageal reflux disease)     Hypertension     no longer on meds        Past Surgical History:   Procedure Laterality Date    HX CHOLECYSTECTOMY      HX COLONOSCOPY      HX ENDOSCOPY      HX ORTHOPAEDIC  1999    LOWER BACK - HERNIATED DISC    HX ORTHOPAEDIC      RIGHT WRIST    HX ROTATOR CUFF REPAIR Left     HX TONSILLECTOMY  CHILDHOOD       Family History   Problem Relation Age of Onset    Stroke Mother     Other Mother     Heart Disease Father     Hypertension Father     Elevated Lipids Father     No Known Problems Brother     Heart Disease Maternal Grandmother     Heart Disease Maternal Grandfather     Cancer Paternal Grandmother         LEUKEMIA    No Known Problems Brother     Anesth Problems Neg Hx         Social History     Tobacco Use    Smoking status: Former Smoker     Packs/day: 1.50     Years: 30.00     Pack years: 45.00     Quit date: 2012     Years since quittin.3    Smokeless tobacco: Never Used   Substance Use Topics    Alcohol use: Yes     Comment: OCC        Review of Systems   Constitutional: Negative. Respiratory: Negative. Cardiovascular: Negative. Gastrointestinal: Negative. Endocrine: Negative. Genitourinary: Negative. Musculoskeletal: Positive for neck pain and neck stiffness. Skin: Negative. Allergic/Immunologic: Negative. Hematological: Negative. Psychiatric/Behavioral: Negative. All other systems reviewed and are negative. No flowsheet data found.         Vitals:  Ht 5' 6\" (1.676 m)   Wt 180 lb (81.6 kg)   BMI 29.05 kg/m²    Body mass index is 29.05 kg/m². Physical Exam    Integumentary  Assessment of Surgical Incision - healing and consistent with normal anticipated wound healing. Neurologic  Overall Assessment of Muscle Strength and Tone reveals  Upper Extremities - Right Deltoid - 5/5. Left Deltoid - 5/5. Right Bicep - 5/5. Left Bicep - 5/5. Right Tricep - 5/5. Left Tricep - 5/5. Right Wrist Extensors - 5/5. Left Wrist Extensors - 5/5. Right Wrist Flexors - 5/5. Left Wrist Flexors - 5/5. Right Intrinsics - 5/5. Left Intrinsics - 5/5. General Assessment of Reflexes  Right Hand - Stewart's sign is negative in the right hand. Left Hand - Stewart's sign is negative in the left hand. Reflexes (Dermatomes)  2/2 Normal - Left Bicep (C5-6), Left Tricep (C7-8), Left Brachioradialis (C5-6), Right Bicep (C5-6), Right Tricep (C7-8) and Right Brachioradialis (C5-6). Musculoskeletal  Global Assessment  Examination of related systems reveals - well-developed, well-nourished, in no acute distress, alert and oriented x 3 and normal coordination. Gait and Station - normal gait and station and normal posture. Spine/Ribs/Pelvis  Cervical Spine - Evaluation of related systems reveals - no lymphadenopathy and neurovascularly intact bilaterally. Inspection and Palpation - Tenderness -mild and localized. Assessment of pain reveals the following findings: - Location - cervical area. Dr. Kelin Choudhary was available for immediate consult during this encounter. An electronic signature was used to authenticate this note.   -- JUAN Simmons

## 2022-05-10 NOTE — PROGRESS NOTES
1. Have you been to the ER, urgent care clinic since your last visit? Hospitalized since your last visit? No    2. Have you seen or consulted any other health care providers outside of the [address] [place] [address] since your last visit? Include any pap smears or colon screening.  No    Chief Complaint   Patient presents with    Surgical Follow-up     Sx 2/11/22 C5/7 ACDF (PO#3)

## 2022-06-20 DIAGNOSIS — M48.02 CERVICAL STENOSIS OF SPINE: ICD-10-CM

## 2022-06-20 DIAGNOSIS — Z98.1 S/P CERVICAL SPINAL FUSION: ICD-10-CM

## 2022-06-22 ENCOUNTER — OFFICE VISIT (OUTPATIENT)
Dept: ORTHOPEDIC SURGERY | Age: 67
End: 2022-06-22
Payer: MEDICARE

## 2022-06-22 DIAGNOSIS — M25.512 ACUTE PAIN OF LEFT SHOULDER: Primary | ICD-10-CM

## 2022-06-22 DIAGNOSIS — M77.8 SHOULDER TENDINITIS, LEFT: ICD-10-CM

## 2022-06-22 PROCEDURE — 1123F ACP DISCUSS/DSCN MKR DOCD: CPT | Performed by: ORTHOPAEDIC SURGERY

## 2022-06-22 PROCEDURE — 1101F PT FALLS ASSESS-DOCD LE1/YR: CPT | Performed by: ORTHOPAEDIC SURGERY

## 2022-06-22 PROCEDURE — G8417 CALC BMI ABV UP PARAM F/U: HCPCS | Performed by: ORTHOPAEDIC SURGERY

## 2022-06-22 PROCEDURE — G8536 NO DOC ELDER MAL SCRN: HCPCS | Performed by: ORTHOPAEDIC SURGERY

## 2022-06-22 PROCEDURE — 20610 DRAIN/INJ JOINT/BURSA W/O US: CPT | Performed by: ORTHOPAEDIC SURGERY

## 2022-06-22 PROCEDURE — 3017F COLORECTAL CA SCREEN DOC REV: CPT | Performed by: ORTHOPAEDIC SURGERY

## 2022-06-22 PROCEDURE — G8432 DEP SCR NOT DOC, RNG: HCPCS | Performed by: ORTHOPAEDIC SURGERY

## 2022-06-22 PROCEDURE — 99214 OFFICE O/P EST MOD 30 MIN: CPT | Performed by: ORTHOPAEDIC SURGERY

## 2022-06-22 PROCEDURE — G8427 DOCREV CUR MEDS BY ELIG CLIN: HCPCS | Performed by: ORTHOPAEDIC SURGERY

## 2022-06-22 PROCEDURE — 1090F PRES/ABSN URINE INCON ASSESS: CPT | Performed by: ORTHOPAEDIC SURGERY

## 2022-06-22 PROCEDURE — G8400 PT W/DXA NO RESULTS DOC: HCPCS | Performed by: ORTHOPAEDIC SURGERY

## 2022-06-22 RX ORDER — TRIAMCINOLONE ACETONIDE 40 MG/ML
80 INJECTION, SUSPENSION INTRA-ARTICULAR; INTRAMUSCULAR ONCE
Status: COMPLETED | OUTPATIENT
Start: 2022-06-22 | End: 2022-06-22

## 2022-06-22 RX ADMIN — TRIAMCINOLONE ACETONIDE 80 MG: 40 INJECTION, SUSPENSION INTRA-ARTICULAR; INTRAMUSCULAR at 17:26

## 2022-06-22 NOTE — PROGRESS NOTES
Izabella Alfaro (: 1955) is a 79 y.o. female, patient, here for evaluation of the following chief complaint(s):  Shoulder Pain (left shoulder pain)       HPI:    Patient presents the office today with a chief plaint of left shoulder pain. This is a patient familiar to my office. She is status post rotator cuff repair of the left shoulder. She is done quite well. She is describing discomfort that is insidious. She describes lateral based shoulder pain. Her pain seems to be worse with overhead and reaching. She denies numbness or tingling. She has not noted any swelling. She denies any fever or chills. Allergies   Allergen Reactions    Amlodipine Vertigo    Ace Inhibitors Cough    Codeine Itching       Current Outpatient Medications   Medication Sig    gabapentin (NEURONTIN) 300 mg capsule Take 1 Capsule by mouth two (2) times a day. Max Daily Amount: 600 mg.  cyclobenzaprine (FLEXERIL) 10 mg tablet Take 1 Tablet by mouth three (3) times daily as needed for Muscle Spasm(s). Indications: muscle spasm    naloxone (Narcan) 4 mg/actuation nasal spray Use 1 spray intranasally, then discard. Repeat with new spray every 2 min as needed for opioid overdose symptoms, alternating nostrils.  omeprazole (PRILOSEC) 20 mg capsule Take 20 mg by mouth daily.  irbesartan (AVAPRO) 300 mg tablet Take 300 mg by mouth daily.  montelukast (SINGULAIR) 10 mg tablet Take 10 mg by mouth daily.  fexofenadine-pseudoephedrine (Allegra-D 12 Hour)  mg Tb12 Take 1 Tablet by mouth daily.  budesonide-formoteroL (Symbicort) 160-4.5 mcg/actuation HFAA Take 2 Puffs by inhalation as needed for Cough.  tiotropium (Spiriva with HandiHaler) 18 mcg inhalation capsule Take 1 Capsule by mouth daily.  rosuvastatin (CRESTOR) 10 mg tablet Take 1 Tablet by mouth nightly.  LORazepam (ATIVAN) 0.5 mg tablet Take 0.5 mg by mouth two (2) times a day.     dilTIAZem ER (CARDIZEM CD) 240 mg capsule Take 1 Capsule by mouth nightly.  gabapentin (Neurontin) 300 mg capsule Take 1 Capsule by mouth nightly. Max Daily Amount: 300 mg.    zolpidem (AMBIEN) 10 mg tablet Take 10 mg by mouth nightly as needed for Sleep.  multivitamins-minerals-lutein (CENTRUM SILVER) Tab Take  by mouth.  Calcium-Cholecalciferol, D3, (CALCIUM 600 WITH VITAMIN D3) 600 mg(1,500mg) -400 unit cap Take 1 Capsule by mouth two (2) times a day. No current facility-administered medications for this visit.        Past Medical History:   Diagnosis Date    Chronic obstructive pulmonary disease ([facility]     Chronic pain     BOTH ARMS AND NECK    Depression with anxiety     GERD (gastroesophageal reflux disease)     Hypertension     no longer on meds        Past Surgical History:   Procedure Laterality Date    HX CHOLECYSTECTOMY      HX COLONOSCOPY      HX ENDOSCOPY      HX ORTHOPAEDIC  1999    LOWER BACK - HERNIATED DISC    HX ORTHOPAEDIC      RIGHT WRIST    HX ROTATOR CUFF REPAIR Left     HX TONSILLECTOMY  CHILDHOOD       Family History   Problem Relation Age of Onset    Stroke Mother     Other Mother     Heart Disease Father     Hypertension Father     Elevated Lipids Father     No Known Problems Brother     Heart Disease Maternal Grandmother     Heart Disease Maternal Grandfather     Cancer Paternal Grandmother         LEUKEMIA    No Known Problems Brother     Anesth Problems Neg Hx         Social History     Socioeconomic History    Marital status:      Spouse name: Not on file    Number of children: Not on file    Years of education: Not on file    Highest education level: Not on file   Occupational History    Not on file   Tobacco Use    Smoking status: Former Smoker     Packs/day: 1.50     Years: 30.00     Pack years: 45.00     Quit date: 2012     Years since quittin.5    Smokeless tobacco: Never Used   Vaping Use    Vaping Use: Never used   Substance and Sexual Activity    Alcohol use: Yes     Comment: OCC    Drug use: Never    Sexual activity: Not on file   Other Topics Concern    Not on file   Social History Narrative    Not on file     Social Determinants of Health     Financial Resource Strain:     Difficulty of Paying Living Expenses: Not on file   Food Insecurity:     Worried About Running Out of Food in the Last Year: Not on file    Carli of Food in the Last Year: Not on file   Transportation Needs:     Lack of Transportation (Medical): Not on file    Lack of Transportation (Non-Medical): Not on file   Physical Activity:     Days of Exercise per Week: Not on file    Minutes of Exercise per Session: Not on file   Stress:     Feeling of Stress : Not on file   Social Connections:     Frequency of Communication with Friends and Family: Not on file    Frequency of Social Gatherings with Friends and Family: Not on file    Attends Judaism Services: Not on file    Active Member of [address] [place] [address] [employer] or Organizations: Not on file    Attends Club or Organization Meetings: Not on file    Marital Status: Not on file   Intimate Partner Violence:     Fear of Current or Ex-Partner: Not on file    Emotionally Abused: Not on file    Physically Abused: Not on file    Sexually Abused: Not on file   Housing Stability:     Unable to Pay for Housing in the Last Year: Not on file    Number of [place] in the Last Year: Not on file    Unstable Housing in the Last Year: Not on file       Review of Systems   Musculoskeletal:        Shoulder pain       Vitals: There were no vitals taken for this visit. There is no height or weight on file to calculate BMI. Ortho Exam     Patient is alert and oriented x3. Patient is in no acute distress. Patient ambulates with a nonantalgic gait. Left shoulder: Prior portal sites of healed well. There is no soft tissue swelling, ecchymosis, abrasions or lacerations.   Active range of motion of the shoulder is full with forward flexion, lateral abduction and external rotation. Internal rotation is to the SI joint and is painful. Passive range of motion is full with a positive impingement sign and a painful Duarte sign. Rotator cuff strength is painful to evaluate and is at 4+/5 with forward flexion and lateral abduction. External rotational strength is maintained. Neurovascular examination intact          Right shoulder:  No shoulder girdle atrophy . There is no soft tissue swelling, ecchymosis, abrasions or lacerations. Active range of motion is full with forward flexion, lateral abduction and external rotation. Internal rotation is to the upper lumbar level with a negative lift-off sign. Passive range of motion is full with a negative impingement sign and a negative Duarte sign. Rotator cuff strength with forward flexion, lateral abduction and external rotation is intact with 5/5 strength. There is no crepitation about the joint. Palpation of the [facility] joint does not reproduce discomfort, and there is no pain elicited with cross-body adduction. Strength of the extremity is 5/5 at biceps/triceps/wrist extension. DRT's are intact at +2/4 and  symmetrical.  Cervical range of motion is full with no pain to palpation along the paraspinal musculature medial border of the scapula. Spurling's sign is negative. XR Results (most recent):  Results from Appointment encounter on 06/22/22    XR SHOULDER LT AP/LAT MIN 2 V    Narrative  Three-view x-ray of the left shoulder reveals osteopenia. I do not appreciate any evidence of osteoarthritis of glenohumeral joint. Retained hardware is not noted. ASSESSMENT/PLAN:    Patient presents the office today with left shoulder pain. She has good range of motion and strength. Her pain seems to be rotator cuff in origin. From a clinical examination, she does not display rotator cuff tear. We have talked about treatment options and she is elected proceed with a cortisone injection.   I believe this is reasonable and appropriate. If she has no improvement with this, I am happy to see her back. Consent for the injection was obtained. Risk of postinjection infection, lack of improvement, hypopigmentation and unusual allergic reaction were explained to the patient. After consent, the skin was sterilely prepped and 80 mg of triamcinolone and 5 cc of 0.25% plain Marcaine was was injected in the left shoulder. Patient had no complications. Patient is to ice modify activities for 24 hours.   Patient is to return to the office if no improvement        Calli Pedraza MD

## 2022-06-22 NOTE — LETTER
6/22/2022    Patient: Mane Tran   YOB: 1955   Date of Visit: 6/22/2022     Nelida Leyva MD  [address] [address]  Via Fax: [phone]    Dear Nelida Leyva MD,      Thank you for referring Ms. Ramon Gomez to [facility] for evaluation. My notes for this consultation are attached. If you have questions, please do not hesitate to call me. I look forward to following your patient along with you.       Sincerely,    Jayda Balderrama MD

## 2022-07-01 ENCOUNTER — TRANSCRIBE ORDER (OUTPATIENT)
Dept: SCHEDULING | Age: 67
End: 2022-07-01

## 2022-07-01 DIAGNOSIS — R10.30 ABDOMINAL PAIN, LOWER: ICD-10-CM

## 2022-07-01 DIAGNOSIS — R19.4 CHANGE IN BOWEL HABITS: ICD-10-CM

## 2022-07-01 DIAGNOSIS — R19.5 PALE STOOL: ICD-10-CM

## 2022-07-01 DIAGNOSIS — R10.12 ABDOMINAL PAIN, LUQ: ICD-10-CM

## 2022-07-01 DIAGNOSIS — K21.9 GASTROESOPHAGEAL REFLUX DISEASE: Primary | ICD-10-CM

## 2022-07-01 DIAGNOSIS — R11.0 NAUSEA: ICD-10-CM

## 2022-07-01 DIAGNOSIS — R19.8 TENESMUS (RECTAL): ICD-10-CM

## 2022-07-14 ENCOUNTER — HOSPITAL ENCOUNTER (OUTPATIENT)
Dept: CT IMAGING | Age: 67
Discharge: HOME | End: 2022-07-14
Attending: NURSE PRACTITIONER
Payer: MEDICARE

## 2022-07-14 DIAGNOSIS — R19.4 CHANGE IN BOWEL HABITS: ICD-10-CM

## 2022-07-14 DIAGNOSIS — R10.30 ABDOMINAL PAIN, LOWER: ICD-10-CM

## 2022-07-14 DIAGNOSIS — R11.0 NAUSEA: ICD-10-CM

## 2022-07-14 DIAGNOSIS — R19.8 TENESMUS (RECTAL): ICD-10-CM

## 2022-07-14 DIAGNOSIS — R10.12 ABDOMINAL PAIN, LUQ: ICD-10-CM

## 2022-07-14 DIAGNOSIS — K21.9 GASTROESOPHAGEAL REFLUX DISEASE: ICD-10-CM

## 2022-07-14 DIAGNOSIS — R19.5 PALE STOOL: ICD-10-CM

## 2022-07-14 LAB — CREAT BLD-MCNC: 1.2 MG/DL (ref 0.6–1.3)

## 2022-07-14 PROCEDURE — 82565 ASSAY OF CREATININE: CPT

## 2022-07-14 PROCEDURE — 74177 CT ABD & PELVIS W/CONTRAST: CPT

## 2022-07-14 PROCEDURE — 74011000636 HC RX REV CODE- 636: Performed by: RADIOLOGY

## 2022-07-14 RX ADMIN — IOPAMIDOL 100 ML: 755 INJECTION, SOLUTION INTRAVENOUS at 12:20

## 2022-07-29 ENCOUNTER — HOSPITAL ENCOUNTER (OUTPATIENT)
Dept: NUCLEAR MEDICINE | Age: 67
Discharge: HOME | End: 2022-07-29
Attending: NURSE PRACTITIONER
Payer: MEDICARE

## 2022-07-29 DIAGNOSIS — R10.30 ABDOMINAL PAIN, LOWER: ICD-10-CM

## 2022-07-29 DIAGNOSIS — R11.0 NAUSEA: ICD-10-CM

## 2022-07-29 DIAGNOSIS — R10.12 ABDOMINAL PAIN, LUQ: ICD-10-CM

## 2022-07-29 DIAGNOSIS — R19.5 PALE STOOL: ICD-10-CM

## 2022-07-29 DIAGNOSIS — R19.8 TENESMUS (RECTAL): ICD-10-CM

## 2022-07-29 DIAGNOSIS — R19.4 CHANGE IN BOWEL HABITS: ICD-10-CM

## 2022-07-29 DIAGNOSIS — K21.9 GASTROESOPHAGEAL REFLUX DISEASE: ICD-10-CM

## 2022-07-29 PROCEDURE — 78264 GASTRIC EMPTYING IMG STUDY: CPT

## 2022-07-29 RX ORDER — TECHNETIUM TC 99M SULFUR COLLOID 2 MG
0.32 KIT MISCELLANEOUS
Status: COMPLETED | OUTPATIENT
Start: 2022-07-29 | End: 2022-07-29

## 2022-07-29 RX ADMIN — TECHNETIUM TC 99M SULFUR COLLOID 0.32 MILLICURIE: KIT at 07:10

## 2022-08-22 RX ORDER — FAMOTIDINE 20 MG/1
20 TABLET, FILM COATED ORAL EVERY EVENING
COMMUNITY

## 2022-08-22 RX ORDER — IRBESARTAN 150 MG/1
150 TABLET ORAL DAILY
COMMUNITY

## 2022-08-22 RX ORDER — MINERAL OIL
180 ENEMA (ML) RECTAL DAILY
COMMUNITY

## 2022-08-22 RX ORDER — MELOXICAM 15 MG/1
15 TABLET ORAL DAILY
COMMUNITY
End: 2022-08-30

## 2022-08-22 RX ORDER — ONDANSETRON 4 MG/1
4 TABLET, FILM COATED ORAL
COMMUNITY

## 2022-08-30 ENCOUNTER — ANESTHESIA (OUTPATIENT)
Dept: ENDOSCOPY | Age: 67
End: 2022-08-30
Payer: MEDICARE

## 2022-08-30 ENCOUNTER — HOSPITAL ENCOUNTER (OUTPATIENT)
Age: 67
Setting detail: OUTPATIENT SURGERY
Discharge: HOME | End: 2022-08-30
Attending: INTERNAL MEDICINE | Admitting: INTERNAL MEDICINE
Payer: MEDICARE

## 2022-08-30 ENCOUNTER — ANESTHESIA EVENT (OUTPATIENT)
Dept: ENDOSCOPY | Age: 67
End: 2022-08-30
Payer: MEDICARE

## 2022-08-30 VITALS
BODY MASS INDEX: 26.68 KG/M2 | WEIGHT: 166 LBS | OXYGEN SATURATION: 100 % | DIASTOLIC BLOOD PRESSURE: 68 MMHG | RESPIRATION RATE: 18 BRPM | TEMPERATURE: 97.8 F | SYSTOLIC BLOOD PRESSURE: 108 MMHG | HEIGHT: 66 IN | HEART RATE: 65 BPM

## 2022-08-30 PROCEDURE — 76040000019: Performed by: INTERNAL MEDICINE

## 2022-08-30 PROCEDURE — 74011250636 HC RX REV CODE- 250/636: Performed by: NURSE ANESTHETIST, CERTIFIED REGISTERED

## 2022-08-30 PROCEDURE — 74011250636 HC RX REV CODE- 250/636: Performed by: INTERNAL MEDICINE

## 2022-08-30 PROCEDURE — 2709999900 HC NON-CHARGEABLE SUPPLY: Performed by: INTERNAL MEDICINE

## 2022-08-30 PROCEDURE — 88305 TISSUE EXAM BY PATHOLOGIST: CPT

## 2022-08-30 PROCEDURE — 76060000031 HC ANESTHESIA FIRST 0.5 HR: Performed by: INTERNAL MEDICINE

## 2022-08-30 PROCEDURE — 77030021593 HC FCPS BIOP ENDOSC BSC -A: Performed by: INTERNAL MEDICINE

## 2022-08-30 PROCEDURE — 74011000250 HC RX REV CODE- 250: Performed by: NURSE ANESTHETIST, CERTIFIED REGISTERED

## 2022-08-30 RX ORDER — MIDAZOLAM HYDROCHLORIDE 1 MG/ML
.25-5 INJECTION, SOLUTION INTRAMUSCULAR; INTRAVENOUS
Status: DISCONTINUED | OUTPATIENT
Start: 2022-08-30 | End: 2022-08-30 | Stop reason: HOSPADM

## 2022-08-30 RX ORDER — SODIUM CHLORIDE 0.9 % (FLUSH) 0.9 %
5-40 SYRINGE (ML) INJECTION EVERY 8 HOURS
Status: DISCONTINUED | OUTPATIENT
Start: 2022-08-30 | End: 2022-08-31 | Stop reason: HOSPADM

## 2022-08-30 RX ORDER — PROPOFOL 10 MG/ML
INJECTION, EMULSION INTRAVENOUS AS NEEDED
Status: DISCONTINUED | OUTPATIENT
Start: 2022-08-30 | End: 2022-08-30 | Stop reason: HOSPADM

## 2022-08-30 RX ORDER — SODIUM CHLORIDE 0.9 % (FLUSH) 0.9 %
5-40 SYRINGE (ML) INJECTION AS NEEDED
Status: DISCONTINUED | OUTPATIENT
Start: 2022-08-30 | End: 2022-08-31 | Stop reason: HOSPADM

## 2022-08-30 RX ORDER — PHENYLEPHRINE HCL IN 0.9% NACL 0.4MG/10ML
SYRINGE (ML) INTRAVENOUS AS NEEDED
Status: DISCONTINUED | OUTPATIENT
Start: 2022-08-30 | End: 2022-08-30 | Stop reason: HOSPADM

## 2022-08-30 RX ORDER — FLUMAZENIL 0.1 MG/ML
0.2 INJECTION INTRAVENOUS
Status: DISCONTINUED | OUTPATIENT
Start: 2022-08-30 | End: 2022-08-30 | Stop reason: HOSPADM

## 2022-08-30 RX ORDER — SODIUM CHLORIDE 9 MG/ML
INJECTION, SOLUTION INTRAVENOUS
Status: DISCONTINUED | OUTPATIENT
Start: 2022-08-30 | End: 2022-08-30 | Stop reason: HOSPADM

## 2022-08-30 RX ORDER — SODIUM CHLORIDE 9 MG/ML
50 INJECTION, SOLUTION INTRAVENOUS CONTINUOUS
Status: DISCONTINUED | OUTPATIENT
Start: 2022-08-30 | End: 2022-08-30 | Stop reason: HOSPADM

## 2022-08-30 RX ORDER — NALOXONE HYDROCHLORIDE 0.4 MG/ML
0.4 INJECTION, SOLUTION INTRAMUSCULAR; INTRAVENOUS; SUBCUTANEOUS
Status: DISCONTINUED | OUTPATIENT
Start: 2022-08-30 | End: 2022-08-30 | Stop reason: HOSPADM

## 2022-08-30 RX ORDER — ATROPINE SULFATE 0.1 MG/ML
0.5 INJECTION INTRAVENOUS
Status: DISCONTINUED | OUTPATIENT
Start: 2022-08-30 | End: 2022-08-31 | Stop reason: HOSPADM

## 2022-08-30 RX ORDER — DEXTROMETHORPHAN/PSEUDOEPHED 2.5-7.5/.8
1.2 DROPS ORAL
Status: DISCONTINUED | OUTPATIENT
Start: 2022-08-30 | End: 2022-08-31 | Stop reason: HOSPADM

## 2022-08-30 RX ORDER — FENTANYL CITRATE 50 UG/ML
25-200 INJECTION, SOLUTION INTRAMUSCULAR; INTRAVENOUS
Status: DISCONTINUED | OUTPATIENT
Start: 2022-08-30 | End: 2022-08-30 | Stop reason: HOSPADM

## 2022-08-30 RX ORDER — LIDOCAINE HYDROCHLORIDE 20 MG/ML
INJECTION, SOLUTION EPIDURAL; INFILTRATION; INTRACAUDAL; PERINEURAL AS NEEDED
Status: DISCONTINUED | OUTPATIENT
Start: 2022-08-30 | End: 2022-08-30 | Stop reason: HOSPADM

## 2022-08-30 RX ORDER — EPINEPHRINE 0.1 MG/ML
1 INJECTION INTRACARDIAC; INTRAVENOUS
Status: DISCONTINUED | OUTPATIENT
Start: 2022-08-30 | End: 2022-08-31 | Stop reason: HOSPADM

## 2022-08-30 RX ADMIN — PROPOFOL 50 MG: 10 INJECTION, EMULSION INTRAVENOUS at 13:46

## 2022-08-30 RX ADMIN — PROPOFOL 50 MG: 10 INJECTION, EMULSION INTRAVENOUS at 13:44

## 2022-08-30 RX ADMIN — PROPOFOL 50 MG: 10 INJECTION, EMULSION INTRAVENOUS at 13:49

## 2022-08-30 RX ADMIN — PROPOFOL 50 MG: 10 INJECTION, EMULSION INTRAVENOUS at 13:52

## 2022-08-30 RX ADMIN — SODIUM CHLORIDE 50 ML/HR: 9 INJECTION, SOLUTION INTRAVENOUS at 14:07

## 2022-08-30 RX ADMIN — LIDOCAINE HYDROCHLORIDE 60 MG: 20 INJECTION, SOLUTION EPIDURAL; INFILTRATION; INTRACAUDAL; PERINEURAL at 13:38

## 2022-08-30 RX ADMIN — PROPOFOL 50 MG: 10 INJECTION, EMULSION INTRAVENOUS at 13:55

## 2022-08-30 RX ADMIN — SODIUM CHLORIDE: 900 INJECTION, SOLUTION INTRAVENOUS at 13:10

## 2022-08-30 RX ADMIN — PROPOFOL 50 MG: 10 INJECTION, EMULSION INTRAVENOUS at 13:41

## 2022-08-30 RX ADMIN — PROPOFOL 100 MG: 10 INJECTION, EMULSION INTRAVENOUS at 13:38

## 2022-08-30 RX ADMIN — Medication 120 MCG: at 13:46

## 2022-08-30 NOTE — H&P
[address], [address] [place] [address]      History and Physical       NAME:  Seb Chapin   :   1955   MRN:   [id number]             History of Present Illness:  Patient is a 79 y.o. who is seen for change in bowel habits and GERD. PMH:  Past Medical History:   Diagnosis Date    Chronic obstructive pulmonary disease (HCC)     Chronic pain     BOTH ARMS AND NECK    Depression with anxiety     GERD (gastroesophageal reflux disease)     Hypertension     no longer on meds       PSH:  Past Surgical History:   Procedure Laterality Date    HX CHOLECYSTECTOMY      HX COLONOSCOPY      HX ENDOSCOPY      HX ORTHOPAEDIC  1999    LOWER BACK - HERNIATED DISC    HX ORTHOPAEDIC      RIGHT WRIST    HX ROTATOR CUFF REPAIR Left     HX TONSILLECTOMY  CHILDHOOD       Allergies: Allergies   Allergen Reactions    Amlodipine Vertigo    Ace Inhibitors Cough    Hydrocodone Rash and Itching       Home Medications:  Prior to Admission Medications   Prescriptions Last Dose Informant Patient Reported? Taking? CALCIUM-VITAMIN D3 PO 2022  Yes Yes   Sig: Take  by mouth. Takes one po twice a day. LORazepam (ATIVAN) 0.5 mg tablet 2022  Yes Yes   Sig: Take 0.5 mg by mouth two (2) times daily as needed. MULTIVITAMIN PO 2022  Yes Yes   Sig: Take  by mouth. Takes one po once daily. dilTIAZem ER (CARDIZEM CD) 240 mg capsule 2022  Yes Yes   Sig: Take 1 Capsule by mouth nightly. famotidine (PEPCID) 20 mg tablet 2022  Yes Yes   Sig: Take 20 mg by mouth every evening. fexofenadine (ALLEGRA) 180 mg tablet 2022  Yes Yes   Sig: Take 180 mg by mouth daily. irbesartan (AVAPRO) 150 mg tablet 2022  Yes Yes   Sig: Take 150 mg by mouth daily. Unsure if it has HCTZ   linaCLOtide (LINZESS) 72 mcg cap capsule 2022  Yes Yes   Sig: Take 72 mcg by mouth daily. meloxicam (MOBIC) 15 mg tablet 2022  Yes Yes   Sig: Take 15 mg by mouth daily.    omeprazole (PRILOSEC) 20 mg capsule 2022  Yes Yes   Sig: Take 20 mg by mouth every morning. ondansetron hcl (ZOFRAN) 4 mg tablet 2022  Yes Yes   Sig: Take 4 mg by mouth daily as needed for Nausea or Vomiting. rosuvastatin (CRESTOR) 10 mg tablet 2022  Yes Yes   Sig: Take 1 Tablet by mouth every evening. tiotropium (SPIRIVA) 18 mcg inhalation capsule 2022  Yes Yes   Sig: Take 1 Capsule by mouth daily. zolpidem (AMBIEN) 10 mg tablet 2022  Yes Yes   Sig: Take 10 mg by mouth nightly.       Facility-Administered Medications: None       Hospital Medications:  Current Facility-Administered Medications   Medication Dose Route Frequency    0.9% sodium chloride infusion  50 mL/hr IntraVENous CONTINUOUS    sodium chloride (NS) flush 5-40 mL  5-40 mL IntraVENous Q8H    sodium chloride (NS) flush 5-40 mL  5-40 mL IntraVENous PRN    midazolam (VERSED) injection 0.25-5 mg  0.25-5 mg IntraVENous Multiple    fentaNYL citrate (PF) injection  mcg   mcg IntraVENous Multiple    naloxone (NARCAN) injection 0.4 mg  0.4 mg IntraVENous Multiple    flumazeniL (ROMAZICON) 0.1 mg/mL injection 0.2 mg  0.2 mg IntraVENous Multiple    simethicone (MYLICON) 44AU/1.8FF oral drops 80 mg  1.2 mL Oral Multiple    atropine injection 0.5 mg  0.5 mg IntraVENous ONCE PRN    EPINEPHrine (ADRENALIN) 0.1 mg/mL syringe 1 mg  1 mg Endoscopically ONCE PRN     Facility-Administered Medications Ordered in Other Encounters   Medication Dose Route Frequency    0.9% sodium chloride infusion   IntraVENous CONTINUOUS       Social History:  Social History     Tobacco Use    Smoking status: Former     Packs/day: 1.50     Years: 30.00     Pack years: 45.00     Types: Cigarettes     Quit date: 2012     Years since quittin.7    Smokeless tobacco: Never   Substance Use Topics    Alcohol use: Yes     Comment: OCC       Family History:  Family History   Problem Relation Age of Onset    Stroke Mother     Other Mother     Heart Disease Father Hypertension Father     Elevated Lipids Father     No Known Problems Brother     Heart Disease Maternal Grandmother     Heart Disease Maternal Grandfather     Cancer Paternal Grandmother         LEUKEMIA    No Known Problems Brother     Anesth Problems Neg Hx              Review of Systems:      Constitutional: negative fever, negative chills, negative weight loss  Eyes:   negative visual changes  ENT:   negative sore throat, tongue or lip swelling  Respiratory:  negative cough, negative dyspnea  Cards:  negative for chest pain, palpitations, lower extremity edema  GI:   See HPI  :  negative for frequency, dysuria  Integument:  negative for rash and pruritus  Heme:  negative for easy bruising and gum/nose bleeding  Musculoskel: negative for myalgias,  back pain and muscle weakness  Neuro: negative for headaches, dizziness, vertigo  Psych:  negative for feelings of anxiety, depression       Objective:   Patient Vitals for the past 8 hrs:   BP Temp Pulse Resp SpO2 Height Weight   08/30/22 1259 114/68 98.3 °F (36.8 °C) 78 16 99 % -- --   08/30/22 1243 -- -- -- -- -- 5' 6\" (1.676 m) 75.3 kg (166 lb)     No intake/output data recorded. No intake/output data recorded. EXAM:     NEURO-a&o   HEENT-wnl   LUNGS-clear    COR-regular rate and rhythym     ABD-soft , no tenderness, no rebound, good bs     EXT-no edema     Data Review     No results for input(s): WBC, HGB, HCT, PLT, HGBEXT, HCTEXT, PLTEXT in the last 72 hours. No results for input(s): NA, K, CL, CO2, BUN, CREA, GLU, PHOS, CA in the last 72 hours. No results for input(s): AP, TBIL, TP, ALB, GLOB, GGT, AML, LPSE in the last 72 hours. No lab exists for component: SGOT, GPT, AMYP, HLPSE  No results for input(s): INR, PTP, APTT, INREXT in the last 72 hours.        Assessment:     GERD  Change in bowel habits     Patient Active Problem List   Diagnosis Code    Biliary dyskinesia K82.8    Neck pain M54.2    Cervical disc disease M50.90    Cervical stenosis of spinal canal M48.02    Cervical stenosis of spine M48.02     Plan:   The patient was counseled at length about the risks of mirtha Covid-19 in the sarah-operative and post-operative states including the recovery window of their procedure. The patient was made aware that mirtha Covid-19 after a surgical procedure may worsen their prognosis for recovering from the virus and lend to a higher morbidity and or mortality risk. The patient was given the options of postponing their procedure. All of the risks, benefits, and alternatives were discussed. The patient does wish to proceed with the procedure. Endoscopic procedure with MAC     Signed By: Santos Hill.  Ava Olson MD     8/30/2022  1:33 PM

## 2022-08-30 NOTE — PROCEDURES
[address]  [address] [facility], [address] [place] [address]      Colonoscopy Operative Report    Milo Oscar  [id number]  1955      Procedure Type:   Colonoscopy with polypectomy (cold biopsy)     Indications:  change in bowel habits        Pre-operative Diagnosis: see indication above    Post-operative Diagnosis:  See findings below    :  Hafsa Royal. Patrice Juárez MD    Staff: Endoscopy Technician-1: Hermann Huerta  Endoscopy RN-1: Boyd Pennington RN     Referring Provider: Alexia Collier MD      Sedation:  MAC anesthesia Propofol      Procedure Details:  After informed consent was obtained with all risks and benefits of procedure explained and preoperative exam completed, the patient was taken to the endoscopy suite and placed in the left lateral decubitus position. Upon sequential sedation as per above, a digital rectal exam was performed demonstrating internal hemorrhoids. The Olympus pediatric videocolonoscope  was inserted in the rectum and carefully advanced to the terminal ileum. The cecum was identified by the ileocecal valve and appendiceal orifice. The quality of preparation was good. The colonoscope was slowly withdrawn with careful evaluation between folds. Retroflexion in the rectum was completed . Findings:   Rectum: normal  Sigmoid: mild diverticulosis  Descending Colon: normal  Transverse Colon: normal  Ascending Colon: single sessile 2 mm polyp - removed with cold biopsy forceps  Cecum: normal  Terminal Ileum: normal      Specimen Removed:  1. Ascending colon polyp    Complications: None. EBL:  None. Impression:      As above    Recommendations: Follow up surgical pathology  Repeat colonoscopy in 5 years  High fiber diet  Follow up in 2 months    Signed By: Hafsa Royal.  Patrice Juárez MD     8/30/2022  2:19 PM

## 2022-08-30 NOTE — PERIOP NOTES

## 2022-08-30 NOTE — ANESTHESIA POSTPROCEDURE EVALUATION
Post-Anesthesia Evaluation and Assessment    Patient: Claudine Hernandez MRN: [id number]  SSN: [id number]    YOB: 1955  Age: 79 y.o. Sex: female      I have evaluated the patient and they are stable and ready for discharge from the PACU. Cardiovascular Function/Vital Signs  Visit Vitals  BP (!) 83/55   Pulse 72   Temp 36.6 °C (97.8 °F)   Resp 18   Ht 5' 6\" (1.676 m)   Wt 75.3 kg (166 lb)   SpO2 99%   Breastfeeding No   BMI 26.79 kg/m²       Patient is status post MAC anesthesia for Procedure(s):  COLONOSCOPY, ESOPHAGOGASTRODUODENOSCOPY (EGD)  ESOPHAGOGASTRODUODENOSCOPY (EGD)  ESOPHAGOGASTRODUODENAL (EGD) BIOPSY  ENDOSCOPIC POLYPECTOMY. Nausea/Vomiting: None    Postoperative hydration reviewed and adequate. Pain:  Pain Scale 1: Numeric (0 - 10) (08/30/22 1259)  Pain Intensity 1: 0 (08/30/22 1259)   Managed    Neurological Status: At baseline    Mental Status, Level of Consciousness: Alert and  oriented to person, place, and time    Pulmonary Status:   O2 Device: CO2 nasal cannula (08/30/22 1355)   Adequate oxygenation and airway patent    Complications related to anesthesia: None    Post-anesthesia assessment completed. No concerns    Signed By: Phil Nesbitt MD     August 30, 2022              Procedure(s):  COLONOSCOPY, ESOPHAGOGASTRODUODENOSCOPY (EGD)  ESOPHAGOGASTRODUODENOSCOPY (EGD)  ESOPHAGOGASTRODUODENAL (EGD) BIOPSY  ENDOSCOPIC POLYPECTOMY. MAC    <BSHSIANPOST>    INITIAL Post-op Vital signs:   Vitals Value Taken Time   BP 83/59 08/30/22 1410   Temp 36.6 °C (97.8 °F) 08/30/22 1408   Pulse 72 08/30/22 1412   Resp 26 08/30/22 1412   SpO2 97 % 08/30/22 1412   Vitals shown include unvalidated device data.

## 2022-08-30 NOTE — PROCEDURES
[address], [address] [facility] [address]        [address] [facility] (EGD) Procedure Note    John Jackson  1955  [id number]      Procedure: Endoscopic Gastroduodenoscopy --diagnostic, with biopsy    Indication:  GERD     Pre-operative Diagnosis: see indication above    Post-operative Diagnosis: see findings below    : Christos Hong. Philly Mueller MD    Staff: Endoscopy Technician-1: Annalee Ramirez  Endoscopy RN-1: Jv Alston RN     Referring Provider:  Juan M Stafford MD      Anesthesia/Sedation:  MAC anesthesia Propofol        Procedure Details     After informed consent was obtained for the procedure, with all risks and benefits of procedure explained the patient was taken to the endoscopy suite and placed in the left lateral decubitus position. Following sequential administration of sedation as per above, the endoscope was inserted into the mouth and advanced under direct vision to second portion of the duodenum. A careful inspection was made as the gastroscope was withdrawn, including a retroflexed view of the proximal stomach; findings and interventions are described below. Findings:   Esophagus:normal  Stomach: mild patchy erythema - cold biopsies taken  Duodenum: normal to second portion - cold biopsies taken      Therapies:  as above    Specimens: 1. Duodenum, 2. Gastric         EBL: None      Complications:   None; patient tolerated the procedure well. Impression:    As above    Recommendations: Follow up surgical pathology  Continue PPI/H2B  Discontinue NSAID's  Proceed to colonoscopy    Signed By: Christos Hong.  Philly Mueller MD     8/30/2022  2:16 PM

## 2022-08-30 NOTE — ANESTHESIA PREPROCEDURE EVALUATION
Relevant Problems   No relevant active problems       Anesthetic History   No history of anesthetic complications            Review of Systems / Medical History  Patient summary reviewed, nursing notes reviewed and pertinent labs reviewed    Pulmonary  Within defined limits  COPD               Neuro/Psych   Within defined limits           Cardiovascular  Within defined limits  Hypertension                   GI/Hepatic/Renal  Within defined limits   GERD           Endo/Other  Within defined limits           Other Findings              Physical Exam    Airway  Mallampati: II  TM Distance: > 6 cm  Neck ROM: normal range of motion   Mouth opening: Normal     Cardiovascular  Regular rate and rhythm,  S1 and S2 normal,  no murmur, click, rub, or gallop             Dental  No notable dental hx       Pulmonary  Breath sounds clear to auscultation               Abdominal  GI exam deferred       Other Findings            Anesthetic Plan    ASA: 3  Anesthesia type: MAC          Induction: Intravenous  Anesthetic plan and risks discussed with: Patient

## 2022-08-30 NOTE — DISCHARGE INSTRUCTIONS
[address]  174 [facility], [address]    EGD/COLON DISCHARGE INSTRUCTIONS    Bridgette Greene  [id number]  1955    Discomfort:  Sore throat- throat lozenges or warm salt water gargle  redness at IV site- apply warm compress to area; if redness or soreness persist- contact your physician  Gaseous discomfort- walking, belching will help relieve any discomfort  You may not operate a vehicle for 12 hours  You may not engage in an occupation involving machinery or appliances for rest of today  You may not drink alcoholic beverages for at least 12 hours  Avoid making any critical decisions for at least 24 hour  DIET  You may resume your regular diet - however -  remember your colon is empty and a heavy meal will produce gas. Avoid these foods:  vegetables, fried / greasy foods, carbonated drinks    ACTIVITY  You may resume your normal daily activities   Spend the remainder of the day resting -  avoid any strenuous activity. CALL M.D. ANY SIGN OF   Increasing pain, nausea, vomiting  Abdominal distension (swelling)  New increased bleeding (oral or rectal)  Fever (chills)  Pain in chest area  Bloody discharge from nose or mouth  Shortness of breath    Follow-up Instructions:   Call Dr. Rudy Nayak for any questions or problems. Telephone # [phone]    ENDOSCOPY FINDINGS:   Your endoscopy showed mild gastritis for which biopsies were taken. Please continue Prilosec and famotidine. Please discontinue NSAID's. Your colonoscopy showed one small polyp, which was removed. We will contact you about the pathology results and when your next colonoscopy will be due. Signed By: Srikanth Cole. Syed Marrero MD     8/30/2022  2:15 PM         Learning About Coronavirus (COVID-19)  Coronavirus (COVID-19): Overview  What is coronavirus ([id number])? The coronavirus disease (COVID-19) is caused by a virus. It is an illness that was first found in [place], [place], in December 2019.  It has since spread worldwide. The virus can cause fever, cough, and trouble breathing. In severe cases, it can cause pneumonia and make it hard to breathe without help. It can cause death. Coronaviruses are a large group of viruses. They cause the common cold. They also cause more serious illnesses like Middle East respiratory syndrome (MERS) and severe acute respiratory syndrome (SARS). COVID-19 is caused by a novel coronavirus. That means it's a new type that has not been seen in people before. This virus spreads person-to-person through droplets from coughing and sneezing. It can also spread when you are close to someone who is infected. And it can spread when you touch something that has the virus on it, such as a doorknob or a tabletop. What can you do to protect yourself from coronavirus (COVID-19)? The best way to protect yourself from getting sick is to: Avoid areas where there is an outbreak. Avoid contact with people who may be infected. Wash your hands often with soap or alcohol-based hand sanitizers. Avoid crowds and try to stay at least 6 feet away from other people. Wash your hands often, especially after you cough or sneeze. Use soap and water, and scrub for at least 20 seconds. If soap and water aren't available, use an alcohol-based hand . Avoid touching your mouth, nose, and eyes. What can you do to avoid spreading the virus to others? To help avoid spreading the virus to others:  Cover your mouth with a tissue when you cough or sneeze. Then throw the tissue in the trash. Use a disinfectant to clean things that you touch often. Stay home if you are sick or have been exposed to the virus. Don't go to school, work, or public areas. And don't use public transportation. If you are sick:  Leave your home only if you need to get medical care. But call the doctor's office first so they know you're coming. And wear a face mask, if you have one.   If you have a face mask, wear it whenever you're around other people. It can help stop the spread of the virus when you cough or sneeze. Clean and disinfect your home every day. Use household  and disinfectant wipes or sprays. Take special care to clean things that you grab with your hands. These include doorknobs, remote controls, phones, and handles on your refrigerator and microwave. And don't forget countertops, tabletops, bathrooms, and computer keyboards. When to call for help  Call 911 anytime you think you may need emergency care. For example, call if:  You have severe trouble breathing. (You can't talk at all.)  You have constant chest pain or pressure. You are severely dizzy or lightheaded. You are confused or can't think clearly. Your face and lips have a blue color. You pass out (lose consciousness) or are very hard to wake up. Call your doctor now if you develop symptoms such as:  Shortness of breath. Fever. Cough. If you need to get care, call ahead to the doctor's office for instructions before you go. Make sure you wear a face mask, if you have one, to prevent exposing other people to the virus. Where can you get the latest information? The following health organizations are tracking and studying this virus. Their websites contain the most up-to-date information. Danielrobert Franco also learn what to do if you think you may have been exposed to the virus. U.S. Centers for Disease Control and Prevention (CDC): The CDC provides updated news about the disease and travel advice. The website also tells you how to prevent the spread of infection. www.cdc.gov  World Health Organization [facility]): WHO offers information about the virus outbreaks. WHO also has travel advice. www.who.int  Current as of: April 1, 2020               Content Version: 12.4  © 2006-2020 Health[employer].    Care instructions adapted under license by your healthcare professional. If you have questions about a medical condition or this instruction, always ask your healthcare professional. [address] any warranty or liability for your use of this information.

## 2022-11-22 ENCOUNTER — OFFICE VISIT (OUTPATIENT)
Dept: ORTHOPEDIC SURGERY | Age: 67
End: 2022-11-22
Payer: MEDICARE

## 2022-11-22 VITALS — HEIGHT: 66 IN | BODY MASS INDEX: 26.36 KG/M2 | WEIGHT: 164 LBS

## 2022-11-22 DIAGNOSIS — M75.102 NON-TRAUMATIC ROTATOR CUFF TEAR, LEFT: ICD-10-CM

## 2022-11-22 DIAGNOSIS — G89.29 CHRONIC LEFT SHOULDER PAIN: ICD-10-CM

## 2022-11-22 DIAGNOSIS — M25.512 CHRONIC LEFT SHOULDER PAIN: ICD-10-CM

## 2022-11-22 DIAGNOSIS — M25.512 ACUTE PAIN OF LEFT SHOULDER: Primary | ICD-10-CM

## 2022-11-22 PROCEDURE — 99213 OFFICE O/P EST LOW 20 MIN: CPT | Performed by: ORTHOPAEDIC SURGERY

## 2022-11-22 PROCEDURE — G8536 NO DOC ELDER MAL SCRN: HCPCS | Performed by: ORTHOPAEDIC SURGERY

## 2022-11-22 PROCEDURE — 1090F PRES/ABSN URINE INCON ASSESS: CPT | Performed by: ORTHOPAEDIC SURGERY

## 2022-11-22 PROCEDURE — 1101F PT FALLS ASSESS-DOCD LE1/YR: CPT | Performed by: ORTHOPAEDIC SURGERY

## 2022-11-22 PROCEDURE — 3017F COLORECTAL CA SCREEN DOC REV: CPT | Performed by: ORTHOPAEDIC SURGERY

## 2022-11-22 PROCEDURE — G8432 DEP SCR NOT DOC, RNG: HCPCS | Performed by: ORTHOPAEDIC SURGERY

## 2022-11-22 PROCEDURE — G8427 DOCREV CUR MEDS BY ELIG CLIN: HCPCS | Performed by: ORTHOPAEDIC SURGERY

## 2022-11-22 PROCEDURE — 1123F ACP DISCUSS/DSCN MKR DOCD: CPT | Performed by: ORTHOPAEDIC SURGERY

## 2022-11-22 PROCEDURE — G8417 CALC BMI ABV UP PARAM F/U: HCPCS | Performed by: ORTHOPAEDIC SURGERY

## 2022-11-22 PROCEDURE — G8400 PT W/DXA NO RESULTS DOC: HCPCS | Performed by: ORTHOPAEDIC SURGERY

## 2022-11-22 RX ORDER — GABAPENTIN 300 MG/1
300 CAPSULE ORAL
Qty: 30 CAPSULE | Refills: 0 | Status: SHIPPED | OUTPATIENT
Start: 2022-11-22

## 2022-11-22 NOTE — PROGRESS NOTES
Anjel Hill (: 1955) is a 79 y.o. female, patient, here for evaluation of the following chief complaint(s):  Shoulder Pain (Left)       HPI:    She began having increased neck, left shoulder and upper extremity discomfort approximately 2 months ago. The patient reports no specific injury. She states that her pain came on gradually. She describes her pain now as severe, sharp, throbbing, aching, burning, and intermittent. Her discomfort does make it very difficult for her to go to sleep and does frequently wake her up from sleep. She has been experiencing some numbness, tingling, and weakness in her left shoulder and upper extremity. The patient states that her pain continues to get worse. She reports that lying in bed makes pain worse and rest makes pain better. She has been taking Tylenol for her discomfort as needed. She has also been using Biofreeze. The patient has been to formal therapy in the past.  She was not seen in the emergency room for her discomfort. The patient does report previous x-rays performed on her cervical spine. She does report previous rotator cuff surgery by Dr. Benedicto Grace. Previous left shoulder x-rays performed at her last visit on 2022. XR Results (most recent):  Results from Appointment encounter on 22    XR SHOULDER LT AP/LAT MIN 2 V    Narrative  Three-view x-ray of the left shoulder reveals osteopenia. I do not appreciate any evidence of osteoarthritis of glenohumeral joint. Retained hardware is not noted. Allergies   Allergen Reactions    Amlodipine Vertigo    Ace Inhibitors Cough    Hydrocodone Rash and Itching       Current Outpatient Medications   Medication Sig    famotidine (PEPCID) 20 mg tablet Take 20 mg by mouth every evening. irbesartan (AVAPRO) 150 mg tablet Take 150 mg by mouth daily.  Unsure if it has HCTZ    ondansetron hcl (ZOFRAN) 4 mg tablet Take 4 mg by mouth daily as needed for Nausea or Vomiting.    linaCLOtide (LINZESS) 72 mcg cap capsule Take 72 mcg by mouth daily. fexofenadine (ALLEGRA) 180 mg tablet Take 180 mg by mouth daily. MULTIVITAMIN PO Take  by mouth. Takes one po once daily. CALCIUM-VITAMIN D3 PO Take  by mouth. Takes one po twice a day. omeprazole (PRILOSEC) 20 mg capsule Take 20 mg by mouth every morning. tiotropium (SPIRIVA) 18 mcg inhalation capsule Take 1 Capsule by mouth daily. rosuvastatin (CRESTOR) 10 mg tablet Take 1 Tablet by mouth every evening. LORazepam (ATIVAN) 0.5 mg tablet Take 0.5 mg by mouth two (2) times daily as needed. dilTIAZem ER (CARDIZEM CD) 240 mg capsule Take 1 Capsule by mouth nightly. zolpidem (AMBIEN) 10 mg tablet Take 10 mg by mouth nightly. No current facility-administered medications for this visit.        Past Medical History:   Diagnosis Date    Chronic obstructive pulmonary disease ([address]     Chronic pain     BOTH ARMS AND NECK    Depression with anxiety     GERD (gastroesophageal reflux disease)     Hypertension     no longer on meds        Past Surgical History:   Procedure Laterality Date    COLONOSCOPY N/A 8/30/2022    COLONOSCOPY, ESOPHAGOGASTRODUODENOSCOPY (EGD) performed by Sneha Galeas MD at [facility] ENDOSCOPY    HX CHOLECYSTECTOMY      HX COLONOSCOPY      HX ENDOSCOPY      HX ORTHOPAEDIC  1999    LOWER BACK - HERNIATED [address]    HX ORTHOPAEDIC      RIGHT WRIST    HX ROTATOR CUFF REPAIR Left     HX TONSILLECTOMY  CHILDHOOD       Family History   Problem Relation Age of Onset    Stroke Mother     Other Mother     Heart Disease Father     Hypertension Father     Elevated Lipids Father     No Known Problems Brother     Heart Disease Maternal Grandmother     Heart Disease Maternal Grandfather     Cancer Paternal Grandmother         LEUKEMIA    No Known Problems Brother     Anesth Problems Neg Hx         Social History     Socioeconomic History    Marital status:      Spouse name: Not on file    Number of children: Not on file    Years of education: Not on file    Highest education level: Not on file   Occupational History    Not on file   Tobacco Use    Smoking status: Former     Packs/day: 1.50     Years: 30.00     Pack years: 45.00     Types: Cigarettes     Quit date: 2012     Years since quittin.9    Smokeless tobacco: Never   Vaping Use    Vaping Use: Never used   Substance and Sexual Activity    Alcohol use: Yes     Comment: OCC    Drug use: Never    Sexual activity: Not on file   Other Topics Concern    Not on file   Social History Narrative    Not on file     Social Determinants of Health     Financial Resource Strain: Not on file   Food Insecurity: Not on file   Transportation Needs: Not on file   Physical Activity: Not on file   Stress: Not on file   Social Connections: Not on file   Intimate Partner Violence: Not on file   Housing Stability: Not on file       Review of Systems   All other systems reviewed and are negative. Vitals:  Ht 5' 6\" (1.676 m)   Wt 164 lb (74.4 kg)   BMI 26.47 kg/m²    Body mass index is 26.47 kg/m². Ortho Exam     The patient is well-developed and well-nourished. The patient presents today in alert and oriented x3 with a normal mood and affect. The patient stands with a normal weightbearing line and walks with a normal gait. Left shoulder: No shoulder girdle atrophy. There is no soft tissue swelling, ecchymosis, abrasions, or lacerations. Active range of motion of the shoulder is limited to 130 degrees of forward flexion, 120 degrees of lateral abduction, and 70 degrees of external rotation. Internal rotation is to the SI joint and is painful. Passive range of motion is full with a painful impingement sign and painful Duarte sign. Rotator cuff strength is painful to evaluate and is a 4+/5 with forward flexion and lateral abduction. External rotation strength is maintained. There is no crepitation about the joint.   Palpation of the [facility] joint does reproduce discomfort and there is pain elicited with cross body abduction. Strength of the extremity is 5/5 strength at biceps/triceps/wrist extension and is comparable to the contralateral side. DTRs are intact at +2/4 and are symmetrical.  Cervical range of motion is full with no pain to palpation along the paraspinal musculature medial border of the scapula. Spurling's sign is negative. ASSESSMENT/PLAN:      1. Acute pain of left shoulder  2. Chronic left shoulder pain  3. Non-traumatic rotator cuff tear, left  -     MRI SHOULDER LT WO CONT; Future     Below is the assessment and plan developed based on review of pertinent history, physical exam, labs, studies, and medications. We discussed the patient's ongoing left shoulder and upper extremity discomfort. Her signs, symptoms, physical exam, description of her pain, past x-rays, and surgical history are consistent with a recurrent rotator cuff tear. The patient's range of motion is limited secondary to her discomfort. Her strength is decreased compared to normal.  The possible treatment options were discussed with the patient and because of the over 2-month long duration of her increased pain, no improvement with multiple modalities of conservative management including an at-home exercise program, her physical exam, description of her pain, past x-rays, past surgical history, and her inability to complete daily living activities without significant discomfort we elected to obtain an MRI of her left shoulder to further evaluate the severity of her recurrent rotator cuff tear. The MRI images and results will be used in preoperative planning if and likely when additional surgical intervention is necessary. The risks and benefits of the MRI were discussed in detail with the patient and she would like to proceed. We will schedule this at her convenience. She will follow-up with Dr. Kavin Mckeon or myself after her MRI is complete to discuss the images, results, and further treatment options. In the interim, I did encourage her to continue to ice when possible, modify her activity level based on her left shoulder pain, and use anti-inflammatory medication when necessary. She was given a prescription for gabapentin which she will use as needed and as directed. The patient will continue to work on range of motion, strengthening, and stretching exercises with an at-home exercise program as pain tolerates. She will continue attending formal physical therapy for her neck pain. She was referred to formal PT by her primary care physician and states that the PT has been helping reduce her neck discomfort. I will see her back or she will follow-up with Dr. Ashwini Wall as noted above after left shoulder MRI is complete. **We will obtain an MRI for more information to determine the best treatment plan moving forward and help us prepare for surgical intervention if necessary. **    Return for After her left shoulder MRI is complete she will follow-up with either Dr. Lowe or Dr. Ashwini Wall. An electronic signature was used to authenticate this note.   -- Maddy Ulloa MD

## 2022-12-07 ENCOUNTER — OFFICE VISIT (OUTPATIENT)
Dept: ORTHOPEDIC SURGERY | Age: 67
End: 2022-12-07
Payer: MEDICARE

## 2022-12-07 DIAGNOSIS — M75.102 NON-TRAUMATIC ROTATOR CUFF TEAR, LEFT: Primary | ICD-10-CM

## 2022-12-07 DIAGNOSIS — M75.42 IMPINGEMENT SYNDROME OF LEFT SHOULDER: ICD-10-CM

## 2022-12-07 RX ORDER — METHYLPREDNISOLONE ACETATE 80 MG/ML
80 INJECTION, SUSPENSION INTRA-ARTICULAR; INTRALESIONAL; INTRAMUSCULAR; SOFT TISSUE ONCE
Status: COMPLETED | OUTPATIENT
Start: 2022-12-07 | End: 2022-12-07

## 2022-12-07 RX ORDER — BUPIVACAINE HYDROCHLORIDE 2.5 MG/ML
5 INJECTION, SOLUTION INFILTRATION; PERINEURAL ONCE
Status: COMPLETED | OUTPATIENT
Start: 2022-12-07 | End: 2022-12-07

## 2022-12-07 RX ADMIN — METHYLPREDNISOLONE ACETATE 80 MG: 80 INJECTION, SUSPENSION INTRA-ARTICULAR; INTRALESIONAL; INTRAMUSCULAR; SOFT TISSUE at 18:15

## 2022-12-07 RX ADMIN — BUPIVACAINE HYDROCHLORIDE 12.5 MG: 2.5 INJECTION, SOLUTION INFILTRATION; PERINEURAL at 18:15

## 2022-12-07 NOTE — PROGRESS NOTES
Audra Youngblood (: 1955) is a 79 y.o. female, patient, here for evaluation of the following chief complaint(s):  Shoulder Pain (Left shoulder )       HPI:    Patient returns to the office with recurrent discomfort of left shoulder. She was seen in the office at my absence by one of my partners and an MRI evaluation was ordered. She is here today with recurrent pain across the anterolateral aspect of the shoulder. Allergies   Allergen Reactions    Amlodipine Vertigo    Ace Inhibitors Cough    Hydrocodone Rash and Itching       Current Outpatient Medications   Medication Sig    gabapentin (Neurontin) 300 mg capsule Take 1 Capsule by mouth nightly as needed for Pain. Max Daily Amount: 300 mg. Daily at Bedtime    famotidine (PEPCID) 20 mg tablet Take 20 mg by mouth every evening. irbesartan (AVAPRO) 150 mg tablet Take 150 mg by mouth daily. Unsure if it has HCTZ    ondansetron hcl (ZOFRAN) 4 mg tablet Take 4 mg by mouth daily as needed for Nausea or Vomiting.    linaCLOtide (LINZESS) 72 mcg cap capsule Take 72 mcg by mouth daily. fexofenadine (ALLEGRA) 180 mg tablet Take 180 mg by mouth daily. MULTIVITAMIN PO Take  by mouth. Takes one po once daily. CALCIUM-VITAMIN D3 PO Take  by mouth. Takes one po twice a day. omeprazole (PRILOSEC) 20 mg capsule Take 20 mg by mouth every morning. tiotropium (SPIRIVA) 18 mcg inhalation capsule Take 1 Capsule by mouth daily. rosuvastatin (CRESTOR) 10 mg tablet Take 1 Tablet by mouth every evening. LORazepam (ATIVAN) 0.5 mg tablet Take 0.5 mg by mouth two (2) times daily as needed. dilTIAZem ER (CARDIZEM CD) 240 mg capsule Take 1 Capsule by mouth nightly. zolpidem (AMBIEN) 10 mg tablet Take 10 mg by mouth nightly. No current facility-administered medications for this visit.        Past Medical History:   Diagnosis Date    Chronic obstructive pulmonary disease (HCC)     Chronic pain     BOTH ARMS AND NECK    Depression with anxiety     GERD (gastroesophageal reflux disease)     Hypertension     no longer on meds        Past Surgical History:   Procedure Laterality Date    COLONOSCOPY N/A 2022    COLONOSCOPY, ESOPHAGOGASTRODUODENOSCOPY (EGD) performed by Marielena Puente MD at [facility] ENDOSCOPY    HX CHOLECYSTECTOMY      HX COLONOSCOPY      HX ENDOSCOPY      HX ORTHOPAEDIC  1999    LOWER BACK - HERNIATED [address]    HX ORTHOPAEDIC      RIGHT WRIST    HX ROTATOR CUFF REPAIR Left     HX TONSILLECTOMY  CHILDHOOD       Family History   Problem Relation Age of Onset    Stroke Mother     Other Mother     Heart Disease Father     Hypertension Father     Elevated Lipids Father     No Known Problems Brother     Heart Disease Maternal Grandmother     Heart Disease Maternal Grandfather     Cancer Paternal Grandmother         LEUKEMIA    No Known Problems Brother     Anesth Problems Neg Hx         Social History     Socioeconomic History    Marital status:      Spouse name: Not on file    Number of children: Not on file    Years of education: Not on file    Highest education level: Not on file   Occupational History    Not on file   Tobacco Use    Smoking status: Former     Packs/day: 1.50     Years: 30.00     Pack years: 45.00     Types: Cigarettes     Quit date: 2012     Years since quittin.9    Smokeless tobacco: Never   Vaping Use    Vaping Use: Never used   Substance and Sexual Activity    Alcohol use: Yes     Comment: OCC    Drug use: Never    Sexual activity: Not on file   Other Topics Concern    Not on file   Social History Narrative    Not on file     Social Determinants of Health     Financial Resource Strain: Not on file   Food Insecurity: Not on file   Transportation Needs: Not on file   Physical Activity: Not on file   Stress: Not on file   Social Connections: Not on file   Intimate Partner Violence: Not on file   Housing Stability: Not on file       Review of Systems   Musculoskeletal:         Left shoulder     Vitals:   There were no vitals taken for this visit. There is no height or weight on file to calculate BMI. Ortho Exam     Left shoulder: Prior portal sites of healed well. No atrophy is noted. There is no soft tissue swelling, ecchymosis, abrasions or lacerations. Active range of motion of the shoulder is limited to 130° of forward flexion, 120° of lateral abduction and 70° of external rotation. Internal rotation is to the SI joint and is painful. Passive range of motion is full with a painful impingement sign and painful Duarte sign. Rotator cuff strength is painful to evaluate and is at 4+/5 with forward flexion and lateral abduction. External rotational strength is maintained. There is no crepitation about the joint. Palpation of the [facility] joint does not reproduce discomfort and there is no pain elicited with cross-body adduction. Strength of the extremity is 5/5 strength at biceps/triceps/wrist extension and is comparable to the contralateral side. DTR's are intact at +2/4 and are symmetrical.  Cervical range of motion is full with no pain to palpation along the paraspinal musculature or medial border of the scapula. Spurling's sign is negative. MRI evaluation shows thinning of the supraspinatus with a area of full-thickness perforation. Evidence of prior rotator cuff repair is identified. ASSESSMENT/PLAN:    I have gone over the MRI with the patient. MRI is positive for recurrent rotator cuff tendon tear. We talked about operative versus nonoperative management. She is a 15-year-old with a recurrent tear in her shoulder. I explained to the patient, revision rotator cuff repair in this scenario can be quite unpredictable. At this stage she would like to maintain nonoperative treatment. I think this is appropriate. I would recommend physical therapy on the shoulder and also talked her about a cortisone injection.   I explained to the patient, if the cortisone is not successful we will certainly have to wait 12 weeks before considering surgical intervention. In addition of this surgery, it would perhaps be a little more reasonable to consider reverse total shoulder replacement in this scenario. A reverse shoulder replacement would provide a higher predictable rate of recovery success. At this stage she would like to proceed with a cortisone. She was provided a prescription for physical therapy and is to return to the office in 4 weeks. Consent for the injection was obtained. Risk of postinjection infection, lack of improvement, hypopigmentation and unusual allergic reaction were explained to the patient. After consent, the skin was sterilely prepped and 80 mg of Depo-Medrol and 5 cc of 0.25% plain Marcaine was was injected in the left shoulder. Patient had no complications. Patient is to ice modify activities for 24 hours.   Patient is to return to the office if no improvement        Romulo Leiva MD

## 2022-12-07 NOTE — LETTER
12/7/2022    Patient: Ravi Brown   YOB: 1955   Date of Visit: 12/7/2022     Yonathan Moran MD  [address] [address]  Via Fax: [phone]    Dear Yonathan Moran MD,      Thank you for referring Ms. Alys Burkitt to [facility] for evaluation. My notes for this consultation are attached. If you have questions, please do not hesitate to call me. I look forward to following your patient along with you.       Sincerely,    Brenda Echeverria MD

## 2022-12-15 ENCOUNTER — OFFICE VISIT (OUTPATIENT)
Dept: ORTHOPEDIC SURGERY | Age: 67
End: 2022-12-15
Payer: MEDICARE

## 2022-12-15 VITALS — HEIGHT: 66 IN | BODY MASS INDEX: 26.52 KG/M2 | WEIGHT: 165 LBS

## 2022-12-15 DIAGNOSIS — Z98.1 S/P CERVICAL SPINAL FUSION: ICD-10-CM

## 2022-12-15 DIAGNOSIS — M48.02 CERVICAL STENOSIS OF SPINAL CANAL: ICD-10-CM

## 2022-12-15 DIAGNOSIS — M75.102 NON-TRAUMATIC ROTATOR CUFF TEAR, LEFT: ICD-10-CM

## 2022-12-15 DIAGNOSIS — M54.12 CERVICAL RADICULOPATHY: ICD-10-CM

## 2022-12-15 DIAGNOSIS — M54.2 NECK PAIN: Primary | ICD-10-CM

## 2022-12-15 RX ORDER — OXYCODONE HYDROCHLORIDE 5 MG/1
2.5-5 TABLET ORAL
Qty: 30 TABLET | Refills: 0 | Status: SHIPPED | OUTPATIENT
Start: 2022-12-15 | End: 2022-12-22

## 2022-12-15 NOTE — PROGRESS NOTES
Laila Rader (: 1955) is a 79 y.o. female, established  patient, here for evaluation of the following chief complaint(s):  Neck Pain and Arm Pain         SUBJECTIVE/OBJECTIVE:    Anterior cervical discectomy and fusion C5-C7-2022    Laila Rader (: 1955) is a 79 y.o. female who presents for evaluation of neck and left arm pain. Patient presents for evaluation of posterior lateral neck pain with radiation to the left upper arm and forearm. Patient has been followed by Dr. Anita Valel for left shoulder rotator cuff tear and has been offered shoulder replacement however, recently had a cortisone injection and will need to wait 3 months prior to that surgery. The patient states increasing neck and arm pain over the last several months. Patient describes the pain as severe mainly in the left shoulder and left upper arm. He describes the sensation of numbness and tingling in the entire left arm to the hand which she describes as \"spiders crawling\" more. The patient has been taking gabapentin and Tylenol and using Biofreeze topically for pain relief. The patient has been participating in outpatient physical therapy for shoulder and neck for the last 3 months without lasting benefit. The patient denies any new onset of gait or balance disturbance. Pain Assessment  12/15/2022   Location of Pain Neck   Location Modifiers Left;Posterior   Severity of Pain 4   Quality of Pain -   Frequency of Pain -   Limiting Behavior -   Relieving Factors -   Result of Injury -          ROS    The patient denies fevers, chills, chest pain, shortness of breath, nausea, vomiting. Positive for musculoskeletal issues as described in the HPI. Vitals:  Ht 5' 6\" (1.676 m)   Wt 165 lb (74.8 kg)   BMI 26.63 kg/m²    Body mass index is 26.63 kg/m². PHYSICAL EXAM:    Patient is alert and oriented x3 and in no acute distress. Patient ambulates with normal gait without gait aids.   Sensation to light touch in all major nerve distributions in the upper extremities is intact and symmetric. Manual motor testing of the major muscle groups of the upper extremities including  strength, hand intrinsics, wrist flexion/extension, biceps/triceps, deltoid intact and symmetric with the exception of left deltoid function which is weak 4/5. Sherryle Moder Deep tendon reflexes in the biceps, triceps and brachioradialis are +1/4 and symmetric. Range of motion of the cervical spine is considered mildly diminished and painful at terminal range of motion with cervical extension and lateral cervical rotation with full forward flexion chin to chest, cervical extension to 30 degrees, lateral rotation to 70 degrees bilaterally, lateral bending to 30 degrees bilaterally. Negative Car's, negative Spurling's maneuver. There is tenderness to palpation of the cervical paraspinals and upper trapezius bilaterally. IMAGING:    XR Results (most recent):  Results from Appointment encounter on 06/22/22    XR SHOULDER LT AP/LAT MIN 2 V    Narrative  Three-view x-ray of the left shoulder reveals osteopenia. I do not appreciate any evidence of osteoarthritis of glenohumeral joint. Retained hardware is not noted. 2 view radiographs of the cervical spine obtained on 5/10/2022 were reviewed today and demonstrate good alignment of anterior and interbody instrumentation C5-C7 with no evidence of hardware loosening or subsidence. There adjacent segment above the fusion at C4-C5 remains well maintained. No evidence of fracture. MRI Results (most recent):  Results from Appointment encounter on 12/02/22    MRI SHOULDER LT WO CONT    Narrative  EXAM: MRI SHOULDER LT WO CONT    INDICATION: Pain. COMPARISON: Radiographs 6/22/2022    TECHNIQUE: Axial proton density fat-saturated; oblique coronal T1, T2  fat-saturated, and proton density fat-saturated; and oblique sagittal T2  fat-saturated MRI of the left shoulder . CONTRAST: None.     FINDINGS: A.C. joint: Moderate osteoarthrosis. Anterior acromion process type:  Status post acromioplasty. Subacromial spurring is shown. Bone marrow: Surgical artifacts in the anterior posterior superolateral humeral  head. No acute fracture, dislocation, or marrow replacing process. Joint fluid: Small effusions of glenohumeral joint, subacromial subdeltoid bursa  and AC joint. Rotator cuff tendons: Diffuse tendinopathy. Full-thickness tearing within  anterior through posterior supraspinatus tendon, essentially complete tear, with  tendon retraction extending as far as 1.5 cm. Partial-thickness tearing extends  into the anterior infraspinatus tendon. Mild superior subluxation of the humeral  head. Biceps tendon: Intact and located within the bicipital groove. Muscles: Mild volume loss of supraspinatus. No substantial fatty infiltration. Glenoid labrum: Intact. Glenohumeral joint capsule: Intact. Glenohumeral articular cartilage: Mild osteoarthrosis. Soft tissue mass: None. Impression  1. Status post acromioplasty and rotator cuff tendon repair. Subacromial  spurring of the acromial remnant is shown as is moderate AC joint  osteoarthrosis. 2. Complete tearing of supraspinatus tendon with partial-thickness tearing  extending into the anterior infraspinatus tendons. Tendon retraction measuring  up to 1.5 cm. There is mild volume loss of the supraspinatus muscle without  substantial fatty infiltration. 3. Mild glenohumeral osteoarthrosis. Allergies   Allergen Reactions    Amlodipine Vertigo    Ace Inhibitors Cough    Hydrocodone Rash and Itching         Current Outpatient Medications   Medication Sig    oxyCODONE IR (ROXICODONE) 5 mg immediate release tablet Take 0.5-1 Tablets by mouth every four (4) hours as needed for Pain for up to 7 days. Max Daily Amount: 30 mg. Indications: pain, post op pain    gabapentin (Neurontin) 300 mg capsule Take 1 Capsule by mouth nightly as needed for Pain. Max Daily Amount: 300 mg. Daily at Bedtime    famotidine (PEPCID) 20 mg tablet Take 20 mg by mouth every evening. irbesartan (AVAPRO) 150 mg tablet Take 150 mg by mouth daily. Unsure if it has HCTZ    ondansetron hcl (ZOFRAN) 4 mg tablet Take 4 mg by mouth daily as needed for Nausea or Vomiting.    linaCLOtide (LINZESS) 72 mcg cap capsule Take 72 mcg by mouth daily. fexofenadine (ALLEGRA) 180 mg tablet Take 180 mg by mouth daily. MULTIVITAMIN PO Take  by mouth. Takes one po once daily. CALCIUM-VITAMIN D3 PO Take  by mouth. Takes one po twice a day. omeprazole (PRILOSEC) 20 mg capsule Take 20 mg by mouth every morning. tiotropium (SPIRIVA) 18 mcg inhalation capsule Take 1 Capsule by mouth daily. rosuvastatin (CRESTOR) 10 mg tablet Take 1 Tablet by mouth every evening. LORazepam (ATIVAN) 0.5 mg tablet Take 0.5 mg by mouth two (2) times daily as needed. dilTIAZem ER (CARDIZEM CD) 240 mg capsule Take 1 Capsule by mouth nightly. zolpidem (AMBIEN) 10 mg tablet Take 10 mg by mouth nightly. No current facility-administered medications for this visit.          Past Medical History:   Diagnosis Date    Chronic obstructive pulmonary disease (HCC)     Chronic pain     BOTH ARMS AND NECK    Depression with anxiety     GERD (gastroesophageal reflux disease)     Hypertension     no longer on meds          Past Surgical History:   Procedure Laterality Date    COLONOSCOPY N/A 8/30/2022    COLONOSCOPY, ESOPHAGOGASTRODUODENOSCOPY (EGD) performed by Malgorzata Cifuentes MD at [facility] ENDOSCOPY    HX CHOLECYSTECTOMY      HX COLONOSCOPY      HX ENDOSCOPY      HX ORTHOPAEDIC  1999    LOWER BACK - HERNIATED [address]    HX ORTHOPAEDIC      RIGHT WRIST    HX ROTATOR CUFF REPAIR Left     HX TONSILLECTOMY  CHILDHOOD         Family History   Problem Relation Age of Onset    Stroke Mother     Other Mother     Heart Disease Father     Hypertension Father     Elevated Lipids Father     No Known Problems Brother     Heart Disease Maternal Grandmother     Heart Disease Maternal Grandfather     Cancer Paternal Grandmother         LEUKEMIA    No Known Problems Brother     Anesth Problems Neg Hx           Social History     Tobacco Use    Smoking status: Former     Packs/day: 1.50     Years: 30.00     Pack years: 45.00     Types: Cigarettes     Quit date: 2012     Years since quittin.9    Smokeless tobacco: Never   Vaping Use    Vaping Use: Never used   Substance Use Topics    Alcohol use: Yes     Comment: OCC    Drug use: Never                 ASSESSMENT/PLAN:      1. Neck pain  -     MRI CERV SPINE WO CONT; Future  2. S/P cervical spinal fusion  -     MRI CERV SPINE WO CONT; Future  3. Cervical stenosis of spinal canal  -     oxyCODONE IR (ROXICODONE) 5 mg immediate release tablet; Take 0.5-1 Tablets by mouth every four (4) hours as needed for Pain for up to 7 days. Max Daily Amount: 30 mg. Indications: pain, post op pain, Normal, Disp-30 Tablet, R-0Supervising physician: Kristine Menard MD [id number]  -     MRI CERV SPINE WO CONT; Future  4. Cervical radiculopathy  -     MRI CERV SPINE WO CONT; Future      Below is the assessment and plan developed based on review of pertinent history, physical exam, labs, studies, and medications. Have discussed the patients diagnosis and radiographic findings at length and have answered all patient questions to her questions to her satisfaction. Have sent a prescription for oxycodone to be taking sparingly as needed for severe pain. Have informed the patient that this prescription cannot be refilled. Patient signed a pain contract and her  was reviewed. The patient's ongoing pain despite conservative management have referred patient for MRI cervical spine to assess for disc herniation versus stenosis. Plan on seeing patient back for reevaluation once imaging is available for review. The patient understands and agrees to the treatment plan as outlined above.       Return for MRI review. Dr. Rafael Lopez was available for immediate consult during this encounter. An electronic signature was used to authenticate this note.     -- GABRIEL SongC

## 2022-12-15 NOTE — LETTER
CONTROLLED SUBSTANCE MEDICATION AGREEMENT  Patient Name: Narda Sykes  Patient YOB: 1955   [id number]    I understand, that controlled substance medications may be used to help better manage my symptoms and to improve my ability to function at home, work and in social settings. However, I also understand that these medications do have risks, which have been discussed with me, including possible development of physical or psychological dependence. I understand that successful treatment requires mutual trust and honesty between me and my provider. I understand and agree that following this Medication Agreement is necessary in continuing my provider-patient relationship and the success of my treatment plan. Explanation from my Provider: Benefits and Goals of Controlled Substance Medications: There are two potential goals for your treatment: (1) decreased pain and suffering (2) improved daily life functions. There are many possible treatments for your chronic condition(s). Alternatives such as physical therapy, yoga, massage, home daily exercise, meditation, relaxation techniques, injections, chiropractic manipulations, surgery, cognitive therapy, hypnosis and many medications that are not habit-forming may be used. Use of controlled substance medications may be helpful, but they are unlikely to resolve all symptoms or restore all function. Explanation from my Provider: Risks of Controlled Substance Medications:   Opioid pain medications: These medications can lead to problems such as addiction/dependence, sedation, lightheadedness/dizziness, memory issues, falls, constipation, nausea, or vomiting. They may also impair the ability to drive or operate machinery. Additionally, these medications may lower testosterone levels, leading to loss of bone strength, stamina and sex drive.   They may cause problems with breathing, sleep apnea and reduced coughing, which is especially dangerous for patients with lung disease. Overdose or dangerous interactions with alcohol and other medications may occur, leading to death. Hyperalgesia may develop, which means patients receiving opioids for the treatment of pain may become more sensitive to certain painful stimuli, and in some cases, experience pain from ordinarily non-painful stimuli. Women between the ages of 14-53 who could become pregnant should carefully weigh the risks and benefits of opioids with their physicians, as these medications increase the risk of pregnancy complications, including miscarriage,  delivery and stillbirth. It is also possible for babies to be born addicted to opioids. Opioid dependence withdrawal symptoms may include; feelings of uneasiness, increased pain, irritability, belly pain, diarrhea, sweats and goose-flesh. Testosterone replacement therapy:  Potential side effects include increased risk of stroke and heart attack, blood clots, increased blood pressure, increased cholesterol, enlarged prostate, sleep apnea, irritability/aggression and other mood disorders, and decreased fertility. Anjel Hill (1955)             Page 1 of 4    Initials:_______    Benzodiazepines and non-benzodiazepine sleep medications: These medications can lead to problems such as addiction/dependence, sedation, fatigue, lightheadedness, dizziness, incoordination, falls, depression, hallucinations, and impaired judgment, memory and concentration. The ability to drive and operate machinery may also be affected. Abnormal sleep-related behaviors have been reported, including sleepwalking, driving, making telephone calls, eating, or having sex while not fully awake. These medications can suppress breathing and worsen sleep apnea, particularly when combined with alcohol or other sedating medications, potentially leading to death.  Dependence withdrawal symptoms may include tremors, anxiety, hallucinations and seizures. Stimulants:  Common adverse effects include addiction/dependence, increased blood pressure and heart rate, decreased appetite, nausea, involuntary weight loss, insomnia,  irritability, and headaches. These risks may increase when these medications are combined with other stimulants, such as caffeine pills or energy drinks, certain weight loss supplements and oral decongestants. Dependence withdrawal symptoms may include depressed mood, loss of interest, suicidal thoughts, anxiety, fatigue, appetite changes and agitation. I agree and understand that I and my prescriber have the following rights and responsibilities regarding my treatment plan:   1. MY RIGHTS:  To be informed of my treatment and medication plan. To be an active participant in my health and wellbeing. 2. MY RESPONSIBILITY AND UNDERSTANDING FOR USE OF MEDICATIONS   I will take medications at the dose and frequency as directed. For my safety, I will not increase or change how I take my medications without the recommendation of my healthcare provider.  I will actively participate in any program recommended by my provider which may improve function, including social, physical, psychological programs.  I will not take my medications with alcohol or other drugs not prescribed to me. I understand that drinking alcohol with my medications increases the chances of side effects, including reduced breathing rate and could lead to personal injury when operating machinery.  I understand that if I have a history of substance use disorders, including alcohol or other illicit drugs, that I may be at increased risk of addiction to my medications.  I agree to notify my provider immediately if I should become pregnant so that my treatment plan can be adjusted.    I agree and understand that I shall only receive controlled substance medications from the prescriber that signed this agreement unless there is written agreement among other prescribers of controlled substances outlining the responsibility of the medications being prescribed.  I understand that the if the controlled medication is not helping to achieve goals, the dosage may be tapered and no longer prescribed. 3. MY RESPONSIBILITY FOR COMMUNICATION / PRESCRIPTION RENEWALS   I agree that all controlled substance medications that I take will be prescribed only by my provider. If another healthcare provider prescribes me medication in an emergency, I will notify my provider within seventy-two (72) hours. Royal Mccann (1955)             Page 2 of 4    Initials:_______   I will arrange for refills at the prescribed interval ONLY during regular office hours. I will not ask for refills earlier than agreed, after-hours, on holidays or weekends. Refills may take up to 72 hours for processing and prescriptions to reach the pharmacy.  I will inform my other health care providers that I am taking these medications and of the existence of this [id number]. In the event of an emergency, I will provide the same information to the emergency department prescribers.  I will keep my provider updated on the pharmacy I am using for controlled medication prescription filling. 4. MY RESPONSIBILITY FOR PROTECTING MEDICATIONS   I will protect my prescriptions and medications. I understand that lost or misplaced prescriptions will not be replaced.  I will keep medications only for my own use and will not share them with others. I will keep all medications away from children.  I agree that if my medications are adjusted or discontinued, I will properly dispose of any remaining medications. I understand that I will be required to dispose of any remaining controlled medications as, directed by my prescriber, prior to being provided with any prescriptions for other controlled medications.   Medication drop box locations can be found at: [email or web address]  5. MY RESPONSIBILITY WITH ILLEGAL DRUGS    I will not use illegal or street drugs or another person's prescription medications not prescribed to me.  If there are identified addiction type symptoms, then referral to a program may be provided by my provider and I agree to follow through with this recommendation. 6. MY RESPONSIBILITY FOR COOPERATION WITH INVESTIGATIONS   I understand that my provider will comply with any applicable law and may discuss my use and/or possible misuse/abuse of controlled substances and alcohol, as appropriate, with any health care provider involved in my care, pharmacist, or legal authority.  I authorize my provider and pharmacy to cooperate fully with law enforcement agencies (as permitted by law) in the investigation of any possible misuse, sale, or other diversion of my controlled substances.  I agree to waive any applicable privilege or right of privacy or confidentiality with respect to these authorizations. 7. PROVIDERS RIGHT TO MONITOR FOR SAFETY: PRESCRIPTION MONITORING / DRUG TESTING   I consent to drug/toxicology screening and will submit to a drug screen upon my providers request to assure I am only taking the prescribed drugs for my safety monitoring. I understand that a drug screen is a laboratory test in which a sample of my urine, blood or saliva is checked to see what drugs I have been taking. This may entail an observed urine specimen, which means that a nurse or other health care provider may watch me provide urine, and I will cooperate if I am asked to provide an observed specimen. Joanna Schlatter (1955)             Page 3 of 4    Initials:_______  Jeffrey Zarate I understand that my provider will check a copy of my State Prescription Monitoring Program () Report in order to safely prescribe medications.      Pill Counts: I consent to pill counts when requested. I may be asked to bring all my prescribed controlled substance medications, in their original bottles, to all of my scheduled appointments. In addition, my provider may ask me to come to the practice at any time for a random pill count. 8. TERMINATION OF THIS AGREEMENT   For my safety, my prescriber has the right to stop prescribing controlled substance medications and may end this agreement.  Conditions that may result in termination of this agreement:  a. I do not show any improvement in pain, or my activity has not improved. b. I develop rapid tolerance or loss of improvement, as described in my treatment plan.  c. I develop significant side effects from the medication. d. My behavior is not consistent with the responsibilities outlined above, thereby causing safety concerns to continue prescribing controlled substance medications. e. I fail to follow the terms of this agreement. f. Other:____________________________     UNDERSTANDING THIS MEDICATION AGREEMENT:    I have read the above and have had all my questions answered. For chronic disease management, I know that my symptoms can be managed with many types of treatments. A chronic medication trial may be part of my treatment, but I must be an active participant in my care. Medication therapy is only one part of my symptom management plan. In some cases, there may be limited scientific evidence to support the chronic use of certain medications to improve symptoms and daily function. Furthermore, in certain circumstances, there may be scientific information that suggests that the use of chronic controlled substances may worsen my symptoms and increase my risk of unintentional death directly related to this medication therapy. I know that if my provider feels my risk from controlled medications is greater than my benefit, I will have my controlled substance medication(s) compassionately lowered or removed altogether. I further agree to allow this office to contact my HIPAA contact if there are concerns about my safety and use of the controlled medications. I have agreed to use the prescribed controlled substance medications to me as instructed by my provider and as stated in this Medication Agreement. My initial on each page and my signature below shows that I have read each page and I have had the opportunity to ask questions with answers provided by my provider.       Patient Name (Printed): _____________________________________    Patient Signature:  ______________________   Date: _____________      Prescriber Name (Printed): ___________________________________    Prescriber Signature: _____________________  Date: _____________     [facility] (1955)             Page 4 of 4

## 2022-12-19 DIAGNOSIS — G89.29 CHRONIC LEFT SHOULDER PAIN: ICD-10-CM

## 2022-12-19 DIAGNOSIS — M25.512 CHRONIC LEFT SHOULDER PAIN: ICD-10-CM

## 2022-12-19 DIAGNOSIS — M75.102 NON-TRAUMATIC ROTATOR CUFF TEAR, LEFT: ICD-10-CM

## 2022-12-20 RX ORDER — GABAPENTIN 300 MG/1
CAPSULE ORAL
Qty: 30 CAPSULE | Refills: 1 | Status: SHIPPED | OUTPATIENT
Start: 2022-12-20 | End: 2023-01-19

## 2023-01-19 ENCOUNTER — OFFICE VISIT (OUTPATIENT)
Dept: ORTHOPEDIC SURGERY | Age: 68
End: 2023-01-19
Payer: MEDICARE

## 2023-01-19 VITALS — HEIGHT: 66 IN | WEIGHT: 170 LBS | BODY MASS INDEX: 27.32 KG/M2

## 2023-01-19 DIAGNOSIS — M25.512 CHRONIC LEFT SHOULDER PAIN: ICD-10-CM

## 2023-01-19 DIAGNOSIS — M50.30 BULGE OF CERVICAL DISC WITHOUT MYELOPATHY: ICD-10-CM

## 2023-01-19 DIAGNOSIS — M47.812 CERVICAL SPONDYLOSIS: ICD-10-CM

## 2023-01-19 DIAGNOSIS — Z98.1 S/P CERVICAL SPINAL FUSION: Primary | ICD-10-CM

## 2023-01-19 DIAGNOSIS — M48.02 CERVICAL STENOSIS OF SPINAL CANAL: ICD-10-CM

## 2023-01-19 DIAGNOSIS — G89.29 CHRONIC LEFT SHOULDER PAIN: ICD-10-CM

## 2023-01-19 DIAGNOSIS — M75.102 NON-TRAUMATIC ROTATOR CUFF TEAR, LEFT: ICD-10-CM

## 2023-01-19 RX ORDER — GABAPENTIN 300 MG/1
300 CAPSULE ORAL 3 TIMES DAILY
Qty: 90 CAPSULE | Refills: 0 | Status: SHIPPED | OUTPATIENT
Start: 2023-01-19

## 2023-01-19 NOTE — LETTER
CONTROLLED SUBSTANCE MEDICATION AGREEMENT  Patient Name: Brittani Melendez  Patient YOB: 1955   [id number]  I understand, that controlled substance medications may be used to help better manage my symptoms and to improve my ability to function at home, work and in social settings. However, I also understand that these medications do have risks, which have been discussed with me, including possible development of physical or psychological dependence. I understand that successful treatment requires mutual trust and honesty between me and my provider. I understand and agree that following this Medication Agreement is necessary in continuing my provider-patient relationship and the success of my treatment plan. Explanation from my Provider: Benefits and Goals of Controlled Substance Medications: There are two potential goals for your treatment: (1) decreased pain and suffering (2) improved daily life functions. There are many possible treatments for your chronic condition(s). Alternatives such as physical therapy, yoga, massage, home daily exercise, meditation, relaxation techniques, injections, chiropractic manipulations, surgery, cognitive therapy, hypnosis and many medications that are not habit-forming may be used. Use of controlled substance medications may be helpful, but they are unlikely to resolve all symptoms or restore all function. Explanation from my Provider: Risks of Controlled Substance Medications:   Opioid pain medications: These medications can lead to problems such as addiction/dependence, sedation, lightheadedness/dizziness, memory issues, falls, constipation, nausea, or vomiting. They may also impair the ability to drive or operate machinery. Additionally, these medications may lower testosterone levels, leading to loss of bone strength, stamina and sex drive.   They may cause problems with breathing, sleep apnea and reduced coughing, which is especially dangerous for patients with lung disease. Overdose or dangerous interactions with alcohol and other medications may occur, leading to death. Hyperalgesia may develop, which means patients receiving opioids for the treatment of pain may become more sensitive to certain painful stimuli, and in some cases, experience pain from ordinarily non-painful stimuli. Women between the ages of 14-53 who could become pregnant should carefully weigh the risks and benefits of opioids with their physicians, as these medications increase the risk of pregnancy complications, including miscarriage,  delivery and stillbirth. It is also possible for babies to be born addicted to opioids. Opioid dependence withdrawal symptoms may include; feelings of uneasiness, increased pain, irritability, belly pain, diarrhea, sweats and goose-flesh. Testosterone replacement therapy:  Potential side effects include increased risk of stroke and heart attack, blood clots, increased blood pressure, increased cholesterol, enlarged prostate, sleep apnea, irritability/aggression and other mood disorders, and decreased fertility. Era Caballero (1955)             Page 1 of 4    Initials:_______    Benzodiazepines and non-benzodiazepine sleep medications: These medications can lead to problems such as addiction/dependence, sedation, fatigue, lightheadedness, dizziness, incoordination, falls, depression, hallucinations, and impaired judgment, memory and concentration. The ability to drive and operate machinery may also be affected. Abnormal sleep-related behaviors have been reported, including sleepwalking, driving, making telephone calls, eating, or having sex while not fully awake. These medications can suppress breathing and worsen sleep apnea, particularly when combined with alcohol or other sedating medications, potentially leading to death.  Dependence withdrawal symptoms may include tremors, anxiety, hallucinations and seizures. Stimulants:  Common adverse effects include addiction/dependence, increased blood pressure and heart rate, decreased appetite, nausea, involuntary weight loss, insomnia,  irritability, and headaches. These risks may increase when these medications are combined with other stimulants, such as caffeine pills or energy drinks, certain weight loss supplements and oral decongestants. Dependence withdrawal symptoms may include depressed mood, loss of interest, suicidal thoughts, anxiety, fatigue, appetite changes and agitation. I agree and understand that I and my prescriber have the following rights and responsibilities regarding my treatment plan:   1. MY RIGHTS:  To be informed of my treatment and medication plan. To be an active participant in my health and wellbeing. 2. MY RESPONSIBILITY AND UNDERSTANDING FOR USE OF MEDICATIONS   I will take medications at the dose and frequency as directed. For my safety, I will not increase or change how I take my medications without the recommendation of my healthcare provider.  I will actively participate in any program recommended by my provider which may improve function, including social, physical, psychological programs.  I will not take my medications with alcohol or other drugs not prescribed to me. I understand that drinking alcohol with my medications increases the chances of side effects, including reduced breathing rate and could lead to personal injury when operating machinery.  I understand that if I have a history of substance use disorders, including alcohol or other illicit drugs, that I may be at increased risk of addiction to my medications.  I agree to notify my provider immediately if I should become pregnant so that my treatment plan can be adjusted.    I agree and understand that I shall only receive controlled substance medications from the prescriber that signed this agreement unless there is written agreement among other prescribers of controlled substances outlining the responsibility of the medications being prescribed.  I understand that the if the controlled medication is not helping to achieve goals, the dosage may be tapered and no longer prescribed. 3. MY RESPONSIBILITY FOR COMMUNICATION / PRESCRIPTION RENEWALS   I agree that all controlled substance medications that I take will be prescribed only by my provider. If another healthcare provider prescribes me medication in an emergency, I will notify my provider within seventy-two (72) hours. Traci Costello (1955)             Page 2 of 4    Initials:_______   I will arrange for refills at the prescribed interval ONLY during regular office hours. I will not ask for refills earlier than agreed, after-hours, on holidays or weekends. Refills may take up to 72 hours for processing and prescriptions to reach the pharmacy.  I will inform my other health care providers that I am taking these medications and of the existence of this [id number]. In the event of an emergency, I will provide the same information to the emergency department prescribers.  I will keep my provider updated on the pharmacy I am using for controlled medication prescription filling. 4. MY RESPONSIBILITY FOR PROTECTING MEDICATIONS   I will protect my prescriptions and medications. I understand that lost or misplaced prescriptions will not be replaced.  I will keep medications only for my own use and will not share them with others. I will keep all medications away from children.  I agree that if my medications are adjusted or discontinued, I will properly dispose of any remaining medications. I understand that I will be required to dispose of any remaining controlled medications as, directed by my prescriber, prior to being provided with any prescriptions for other controlled medications.   Medication drop box locations can be found at: [employer]  5. MY RESPONSIBILITY WITH ILLEGAL DRUGS    I will not use illegal or street drugs or another person's prescription medications not prescribed to me.  If there are identified addiction type symptoms, then referral to a program may be provided by my provider and I agree to follow through with this recommendation. 6. MY RESPONSIBILITY FOR COOPERATION WITH INVESTIGATIONS   I understand that my provider will comply with any applicable law and may discuss my use and/or possible misuse/abuse of controlled substances and alcohol, as appropriate, with any health care provider involved in my care, pharmacist, or legal authority.  I authorize my provider and pharmacy to cooperate fully with law enforcement agencies (as permitted by law) in the investigation of any possible misuse, sale, or other diversion of my controlled substances.  I agree to waive any applicable privilege or right of privacy or confidentiality with respect to these authorizations. 7. PROVIDERS RIGHT TO MONITOR FOR SAFETY: PRESCRIPTION MONITORING / DRUG TESTING   I consent to drug/toxicology screening and will submit to a drug screen upon my providers request to assure I am only taking the prescribed drugs for my safety monitoring. I understand that a drug screen is a laboratory test in which a sample of my urine, blood or saliva is checked to see what drugs I have been taking. This may entail an observed urine specimen, which means that a nurse or other health care provider may watch me provide urine, and I will cooperate if I am asked to provide an observed specimen. Virgilio Moncada (1955)             Page 3 of 4    Initials:_______  Giovanny Obrien I understand that my provider will check a copy of my State Prescription Monitoring Program () Report in order to safely prescribe medications.      Pill Counts: I consent to pill counts when requested. I may be asked to bring all my prescribed controlled substance medications, in their original bottles, to all of my scheduled appointments. In addition, my provider may ask me to come to the practice at any time for a random pill count. 8. TERMINATION OF THIS AGREEMENT   For my safety, my prescriber has the right to stop prescribing controlled substance medications and may end this agreement.  Conditions that may result in termination of this agreement:  a. I do not show any improvement in pain, or my activity has not improved. b. I develop rapid tolerance or loss of improvement, as described in my treatment plan.  c. I develop significant side effects from the medication. d. My behavior is not consistent with the responsibilities outlined above, thereby causing safety concerns to continue prescribing controlled substance medications. e. I fail to follow the terms of this agreement. f. Other:____________________________     UNDERSTANDING THIS MEDICATION AGREEMENT:    I have read the above and have had all my questions answered. For chronic disease management, I know that my symptoms can be managed with many types of treatments. A chronic medication trial may be part of my treatment, but I must be an active participant in my care. Medication therapy is only one part of my symptom management plan. In some cases, there may be limited scientific evidence to support the chronic use of certain medications to improve symptoms and daily function. Furthermore, in certain circumstances, there may be scientific information that suggests that the use of chronic controlled substances may worsen my symptoms and increase my risk of unintentional death directly related to this medication therapy. I know that if my provider feels my risk from controlled medications is greater than my benefit, I will have my controlled substance medication(s) compassionately lowered or removed altogether. I further agree to allow this office to contact my HIPAA contact if there are concerns about my safety and use of the controlled medications. I have agreed to use the prescribed controlled substance medications to me as instructed by my provider and as stated in this Medication Agreement. My initial on each page and my signature below shows that I have read each page and I have had the opportunity to ask questions with answers provided by my provider.       Patient Name (Printed): _____________________________________    Patient Signature:  ______________________   Date: _____________      Prescriber Name (Printed): ___________________________________    Prescriber Signature: _____________________  Date: _____________     Adrian Gaviria (1955)             Page 4 of 4

## 2023-01-20 NOTE — PROGRESS NOTES
Lesley Flowers (: 1955) is a 79 y.o. female patient here for evaluation of the following chief complaint(s):  Arm Pain and Neck Pain         ASSESSMENT/PLAN:  Below is the assessment and plan developed based on review of pertinent history, physical exam, labs, studies, and medications. 1. S/P cervical spinal fusion  -     REFERRAL TO PAIN MANAGEMENT  -     gabapentin (NEURONTIN) 300 mg capsule; Take 1 Capsule by mouth three (3) times daily. Max Daily Amount: 900 mg., Normal, Disp-90 Capsule, R-0  2. Cervical stenosis of spinal canal  -     REFERRAL TO PAIN MANAGEMENT  -     gabapentin (NEURONTIN) 300 mg capsule; Take 1 Capsule by mouth three (3) times daily. Max Daily Amount: 900 mg., Normal, Disp-90 Capsule, R-0  3. Cervical spondylosis  4. Bulge of cervical disc without myelopathy  5. Chronic left shoulder pain  -     gabapentin (NEURONTIN) 300 mg capsule; Take 1 Capsule by mouth three (3) times daily. Max Daily Amount: 900 mg., Normal, Disp-90 Capsule, R-0  6. Non-traumatic rotator cuff tear, left  -     gabapentin (NEURONTIN) 300 mg capsule; Take 1 Capsule by mouth three (3) times daily. Max Daily Amount: 900 mg., Normal, Disp-90 Capsule, R-0      Have discussed the patient's diagnosis and radiographic findings at length and have answered all patient questions to her satisfaction. Have sent a prescription for gabapentin. Given the patient's ongoing pain despite conservative management have referred the patient for transforaminal epidural steroid injection. With failure to find lasting pain relief following 2 or 3 injections have asked the patient to return to our office for reevaluation. The patient will follow up with Dr. Sydnee Aguiar regarding the left shoulder. The patient understands and agrees to the treatment plan as outlined above. electronically to the patient's preferred pharmacy. No follow-ups on file.       SUBJECTIVE/OBJECTIVE:  Lesley Flowers (: 1955) is a 79 y.o. female who presents today for the following:  Chief Complaint   Patient presents with    Arm Pain    Neck Pain        HPI   Patient returns for follow-up evaluation of neck and upper extremity pain patient states symptoms have been present for the past 4 months. Patient has been followed by Dr. Dionicio Lechuga for left shoulder rotator cuff tear and has been offered shoulder replacement surgery however, recently underwent cortisone injections and needed to wait 3 months prior to surgery patient states over the last month she has had increasing neck and upper arm and forearm pain on the left. Patient describes numbness and tingling in the entire left arm to the hand which she describes as \"spiders crawling on her skin\". The patient has been taking gabapentin at night and Tylenol as needed for pain relief. Patient states her symptoms are worse at night. Patient recently discontinued several weeks of outpatient physical therapy with temporizing improvement of symptoms. Patient states at night her pain symptoms are severe in nature. Patient denies gait/balance disturbance    IMAGING:  XR Results (most recent):  Results from Appointment encounter on 06/22/22    XR SHOULDER LT AP/LAT MIN 2 V    Narrative  Three-view x-ray of the left shoulder reveals osteopenia. I do not appreciate any evidence of osteoarthritis of glenohumeral joint. Retained hardware is not noted. MRI Results (most recent):  Results from Appointment encounter on 12/23/22    MRI CERV SPINE WO CONT    Narrative  EXAM: MRI CERV SPINE WO CONT    INDICATION: Dx: S/P cervical spinal fusion [Z98.1 (ICD-10-CM)]; Neck pain [M54.2  (ICD-10-CM)]; Cervical stenosis of spinal canal [M48.02 (ICD-10-CM)];  Cervical  radiculopathy [L08.57 (ICD-10-CM)]    COMPARISON: Cervical spine radiographs 5/10/2022, MRI cervical spine 12/22/2021    TECHNIQUE: MR imaging of the cervical spine was performed using the following  sequences: sagittal T1, T2, STIR;  axial T2, T1.    CONTRAST: None.    FINDINGS:    Study limited by motion. C5-C7 ACDF. There is normal alignment of the cervical spine. Vertebral body heights are  maintained. Marrow signal is normal.    The craniocervical junction is intact. The course, caliber, and signal intensity  of the spinal cord are normal.    The paraspinal soft tissues are within normal limits. C2-C3: Central disc protrusion. Facet arthropathy. Left uncovertebral  hypertrophy. No spinal stenosis. Mild left foraminal stenosis. C3-C4: Disc bulge. Facet arthropathy. Uncovertebral hypertrophy. No spinal  stenosis. Mild bilateral foraminal stenosis. C4-C5: Disc bulge. Facet arthropathy. Left uncovertebral hypertrophy. Mild  spinal stenosis. Moderate left and mild right foraminal stenosis. C5-C6: Facet arthropathy. Uncovertebral hypertrophy. No spinal stenosis. Moderate right and mild left foraminal stenosis. C6-C7: Facet arthropathy. Endplate osteophytes. Uncovertebral hypertrophy. No  spinal stenosis. Mild bilateral foraminal stenosis. C7-T1: Facet arthropathy. No stenosis. Impression  1. Study limited by motion. 2.  C5-C7 ACDF. Multilevel degenerative changes and disc disease. C4-C5 mild  spinal canal stenosis (the superior junctional level). Multilevel  mild-to-moderate neural foraminal stenosis as outlined above. Allergies   Allergen Reactions    Amlodipine Vertigo    Ace Inhibitors Cough    Hydrocodone Rash and Itching       Current Outpatient Medications   Medication Sig    gabapentin (NEURONTIN) 300 mg capsule Take 1 Capsule by mouth three (3) times daily. Max Daily Amount: 900 mg.    famotidine (PEPCID) 20 mg tablet Take 20 mg by mouth every evening. irbesartan (AVAPRO) 150 mg tablet Take 150 mg by mouth daily. Unsure if it has HCTZ    ondansetron hcl (ZOFRAN) 4 mg tablet Take 4 mg by mouth daily as needed for Nausea or Vomiting.    linaCLOtide (LINZESS) 72 mcg cap capsule Take 72 mcg by mouth daily.     fexofenadine (ALLEGRA) 180 mg tablet Take 180 mg by mouth daily. MULTIVITAMIN PO Take  by mouth. Takes one po once daily. CALCIUM-VITAMIN D3 PO Take  by mouth. Takes one po twice a day. omeprazole (PRILOSEC) 20 mg capsule Take 20 mg by mouth every morning. tiotropium (SPIRIVA) 18 mcg inhalation capsule Take 1 Capsule by mouth daily. rosuvastatin (CRESTOR) 10 mg tablet Take 1 Tablet by mouth every evening. LORazepam (ATIVAN) 0.5 mg tablet Take 0.5 mg by mouth two (2) times daily as needed. dilTIAZem ER (CARDIZEM CD) 240 mg capsule Take 1 Capsule by mouth nightly. zolpidem (AMBIEN) 10 mg tablet Take 10 mg by mouth nightly. No current facility-administered medications for this visit.        Past Medical History:   Diagnosis Date    Chronic obstructive pulmonary disease ([address]     Chronic pain     BOTH ARMS AND NECK    Depression with anxiety     GERD (gastroesophageal reflux disease)     Hypertension     no longer on meds        Past Surgical History:   Procedure Laterality Date    COLONOSCOPY N/A 8/30/2022    COLONOSCOPY, ESOPHAGOGASTRODUODENOSCOPY (EGD) performed by Kvng Barraza MD at [facility] ENDOSCOPY    HX CHOLECYSTECTOMY      HX COLONOSCOPY      HX ENDOSCOPY      HX ORTHOPAEDIC  1999    LOWER BACK - HERNIATED [address]    HX ORTHOPAEDIC      RIGHT WRIST    HX ROTATOR CUFF REPAIR Left     HX TONSILLECTOMY  CHILDHOOD       Family History   Problem Relation Age of Onset    Stroke Mother     Other Mother     Heart Disease Father     Hypertension Father     Elevated Lipids Father     No Known Problems Brother     Heart Disease Maternal Grandmother     Heart Disease Maternal Grandfather     Cancer Paternal Grandmother         LEUKEMIA    No Known Problems Brother     Anesth Problems Neg Hx         Social History     Tobacco Use    Smoking status: Former     Packs/day: 1.50     Years: 30.00     Pack years: 45.00     Types: Cigarettes     Quit date: 12/18/2012     Years since quitting: 10.0    Smokeless tobacco: Never Substance Use Topics    Alcohol use: Yes     Comment: OCC        Review of Systems     Pain Assessment  1/19/2023   Location of Pain Neck;Arm   Location Modifiers Left   Severity of Pain 1   Quality of Pain Burning   Frequency of Pain -   Limiting Behavior -   Relieving Factors -   Result of Injury No           Vitals:  Ht 5' 6\" (1.676 m)   Wt 170 lb (77.1 kg)   BMI 27.44 kg/m²    Body mass index is 27.44 kg/m². Physical Exam    Patient is alert and oriented x3 and in no acute distress. Patient ambulates with normal gait without gait aids. Sensation to light touch in all major nerve distributions in the upper extremities is intact and symmetric. Manual motor testing of the major muscle groups of the upper extremities including  strength, hand intrinsics, wrist flexion/extension, biceps/triceps, deltoid intact and symmetric with the exception of left shoulder rotator cuff strength which is weak and graded 4/5 deep tendon reflexes in the biceps, triceps and brachioradialis are +2/4 and symmetric. Range of motion of the cervical spine is full and painful at terminal range of motion with cervical extension and lateral cervical rotation to the left with full forward flexion chin to chest, cervical extension to 40 degrees, lateral rotation to 70 degrees bilaterally, lateral bending to 30 degrees bilaterally. Negative Car's, negative Spurling's maneuver. There is tenderness to palpation of the cervical paraspinals and upper trapezius bilaterally. Dr. Terrell Alston was available for immediate consult during this encounter. An electronic signature was used to authenticate this note.   -- Kevin Lorenzo PA-C

## 2023-02-21 ENCOUNTER — OFFICE VISIT (OUTPATIENT)
Dept: ORTHOPEDIC SURGERY | Age: 68
End: 2023-02-21
Payer: MEDICARE

## 2023-02-21 VITALS — WEIGHT: 170 LBS | BODY MASS INDEX: 27.32 KG/M2 | HEIGHT: 66 IN

## 2023-02-21 DIAGNOSIS — M25.512 CHRONIC LEFT SHOULDER PAIN: ICD-10-CM

## 2023-02-21 DIAGNOSIS — G89.29 CHRONIC LEFT SHOULDER PAIN: ICD-10-CM

## 2023-02-21 DIAGNOSIS — M47.812 CERVICAL SPONDYLOSIS: ICD-10-CM

## 2023-02-21 DIAGNOSIS — M75.102 NON-TRAUMATIC ROTATOR CUFF TEAR, LEFT: ICD-10-CM

## 2023-02-21 DIAGNOSIS — M48.02 CERVICAL STENOSIS OF SPINAL CANAL: ICD-10-CM

## 2023-02-21 DIAGNOSIS — M54.12 CERVICAL RADICULOPATHY: ICD-10-CM

## 2023-02-21 DIAGNOSIS — Z98.1 S/P CERVICAL SPINAL FUSION: Primary | ICD-10-CM

## 2023-02-21 DIAGNOSIS — M50.30 BULGE OF CERVICAL DISC WITHOUT MYELOPATHY: ICD-10-CM

## 2023-02-21 DIAGNOSIS — Z98.1 S/P CERVICAL SPINAL FUSION: ICD-10-CM

## 2023-02-21 RX ORDER — GABAPENTIN 300 MG/1
CAPSULE ORAL
Qty: 90 CAPSULE | Refills: 1 | Status: SHIPPED | OUTPATIENT
Start: 2023-02-21

## 2023-02-21 RX ORDER — GABAPENTIN 300 MG/1
300 CAPSULE ORAL
Qty: 90 CAPSULE | Refills: 2 | Status: SHIPPED | OUTPATIENT
Start: 2023-02-21

## 2023-02-21 NOTE — PROGRESS NOTES
Patrick Cleary (: 1955) is a 79 y.o. female, patient, here for evaluation of the following chief complaint(s): Surgical Follow-up (Sx 22 C5/6 ACDF (1 Year PO))       ASSESSMENT/PLAN:    Below is the assessment and plan developed based on review of pertinent history, physical exam, labs, studies, and medications. Have discussed the patient's diagnosis and radiographic findings at length and have answered all patient questions to her satisfaction. Have sent a prescription for gabapentin electronically to the patient's preferred pharmacy. Have asked the patient to follow-up with Dr. Felton Giles regarding her shoulder for consideration of possible reverse total shoulder replacement. Will plan on seeing the patient back for reevaluation on as-needed basis. The patient understands and agrees to the treatment plan as outlined above. 1. S/P cervical spinal fusion  -     XR SPINE CERV PA LAT ODONT 3 V MAX; Future  -     gabapentin (NEURONTIN) 300 mg capsule; Take 1 Capsule by mouth three (3) times daily as needed for Pain. Max Daily Amount: 900 mg. Take 2 tablet by mouth 1 hour before bedtime. May add daytime dose or third bedtime dose if needed for nerve pain. Indications: neuropathic pain, Normal, Disp-90 Capsule, R-2Supervising physician: David Dbobs MD [id number]  2. Cervical spondylosis  -     gabapentin (NEURONTIN) 300 mg capsule; Take 1 Capsule by mouth three (3) times daily as needed for Pain. Max Daily Amount: 900 mg. Take 2 tablet by mouth 1 hour before bedtime. May add daytime dose or third bedtime dose if needed for nerve pain. Indications: neuropathic pain, Normal, Disp-90 Capsule, R-2Supervising physician: David Dobbs MD [id number]  3. Bulge of cervical disc without myelopathy  -     gabapentin (NEURONTIN) 300 mg capsule; Take 1 Capsule by mouth three (3) times daily as needed for Pain. Max Daily Amount: 900 mg. Take 2 tablet by mouth 1 hour before bedtime.  May add daytime dose or third bedtime dose if needed for nerve pain. Indications: neuropathic pain, Normal, Disp-90 Capsule, R-2Supervising physician: Tete Montalvo MD [id number]  4. Cervical radiculopathy  -     gabapentin (NEURONTIN) 300 mg capsule; Take 1 Capsule by mouth three (3) times daily as needed for Pain. Max Daily Amount: 900 mg. Take 2 tablet by mouth 1 hour before bedtime. May add daytime dose or third bedtime dose if needed for nerve pain. Indications: neuropathic pain, Normal, Disp-90 Capsule, R-2Supervising physician: Tete Montalvo MD [id number]  5. Chronic left shoulder pain  -     gabapentin (NEURONTIN) 300 mg capsule; Take 1 Capsule by mouth three (3) times daily as needed for Pain. Max Daily Amount: 900 mg. Take 2 tablet by mouth 1 hour before bedtime. May add daytime dose or third bedtime dose if needed for nerve pain. Indications: neuropathic pain, Normal, Disp-90 Capsule, R-2Supervising physician: Tete Montalvo MD [id number]  6. Non-traumatic rotator cuff tear, left    No follow-ups on file. SUBJECTIVE/OBJECTIVE:        Judd Sosa (: 1955) is a 79 y.o. femaleb who returns for follow-up evaluation of neck and upper extremity pain patient states symptoms have been present for the past 4 months. Patient has been followed by Dr. Kasandra Bolden for left shoulder rotator cuff tear and has been offered shoulder replacement surgery however, recently underwent cortisone injections and needed to wait 3 months prior to surgery. Patient states over the last month she has had increasing neck and upper arm and forearm pain on the left. Patient describes numbness and tingling in the entire left arm to the hand which she describes as \"spiders crawling on her skin\". The patient has been taking gabapentin at night and Tylenol as needed for pain relief. Patient states at this time she is doing \"much better\". Tingling in the left arm has diminished significantly.   She had had 1 epidural steroid injection with Dr. Aj Crawford and states she is 90% improved. Patient feels like the gabapentin and the injection have helped her symptoms significantly. Patient denies gait/balance disturbance       Pain Assessment  1/19/2023   Location of Pain Neck;Arm   Location Modifiers Left   Severity of Pain 1   Quality of Pain Burning   Frequency of Pain -   Limiting Behavior -   Relieving Factors -   Result of Injury No          Imaging:    XR Results (most recent):  Results from Appointment encounter on 02/21/23    XR SPINE CERV PA LAT ODONT 3 V MAX    Narrative  AP and lateral view digital radiographs of the cervical spine obtained in the office today demonstrate good alignment of anterior instrumentation at C5-C7 with no evidence of hardware loosening or subsidence. The the proximal adjacent segment at C4-C5 demonstrates mild disc height loss. The distal adjacent segment at C7-T1 demonstrates moderate disc height loss and 2 mm anterolisthesis. No evidence of acute bony abnormality. MRI Results (most recent):  Results from Appointment encounter on 12/23/22    MRI CERV SPINE WO CONT    Narrative  EXAM: MRI CERV SPINE WO CONT    INDICATION: Dx: S/P cervical spinal fusion [Z98.1 (ICD-10-CM)]; Neck pain [M54.2  (ICD-10-CM)]; Cervical stenosis of spinal canal [M48.02 (ICD-10-CM)]; Cervical  radiculopathy [R23.04 (ICD-10-CM)]    COMPARISON: Cervical spine radiographs 5/10/2022, MRI cervical spine 12/22/2021    TECHNIQUE: MR imaging of the cervical spine was performed using the following  sequences: sagittal T1, T2, STIR;  axial T2, T1.    CONTRAST:  None. FINDINGS:    Study limited by motion. C5-C7 ACDF. There is normal alignment of the cervical spine. Vertebral body heights are  maintained. Marrow signal is normal.    The craniocervical junction is intact. The course, caliber, and signal intensity  of the spinal cord are normal.    The paraspinal soft tissues are within normal limits.     C2-C3: Central disc protrusion. Facet arthropathy. Left uncovertebral  hypertrophy. No spinal stenosis. Mild left foraminal stenosis. C3-C4: Disc bulge. Facet arthropathy. Uncovertebral hypertrophy. No spinal  stenosis. Mild bilateral foraminal stenosis. C4-C5: Disc bulge. Facet arthropathy. Left uncovertebral hypertrophy. Mild  spinal stenosis. Moderate left and mild right foraminal stenosis. C5-C6: Facet arthropathy. Uncovertebral hypertrophy. No spinal stenosis. Moderate right and mild left foraminal stenosis. C6-C7: Facet arthropathy. Endplate osteophytes. Uncovertebral hypertrophy. No  spinal stenosis. Mild bilateral foraminal stenosis. C7-T1: Facet arthropathy. No stenosis. Impression  1. Study limited by motion. 2.  C5-C7 ACDF. Multilevel degenerative changes and disc disease. C4-C5 mild  spinal canal stenosis (the superior junctional level). Multilevel  mild-to-moderate neural foraminal stenosis as outlined above. Allergies   Allergen Reactions    Amlodipine Vertigo    Ace Inhibitors Cough    Hydrocodone Rash and Itching       Current Outpatient Medications   Medication Sig    gabapentin (NEURONTIN) 300 mg capsule Take 1 Capsule by mouth three (3) times daily as needed for Pain. Max Daily Amount: 900 mg. Take 2 tablet by mouth 1 hour before bedtime. May add daytime dose or third bedtime dose if needed for nerve pain. Indications: neuropathic pain    famotidine (PEPCID) 20 mg tablet Take 20 mg by mouth every evening. irbesartan (AVAPRO) 150 mg tablet Take 150 mg by mouth daily. Unsure if it has HCTZ    ondansetron hcl (ZOFRAN) 4 mg tablet Take 4 mg by mouth daily as needed for Nausea or Vomiting.    linaCLOtide (LINZESS) 72 mcg cap capsule Take 72 mcg by mouth daily. fexofenadine (ALLEGRA) 180 mg tablet Take 180 mg by mouth daily. MULTIVITAMIN PO Take  by mouth. Takes one po once daily. CALCIUM-VITAMIN D3 PO Take  by mouth. Takes one po twice a day.     omeprazole (PRILOSEC) 20 mg capsule Take 20 mg by mouth every morning. tiotropium (SPIRIVA) 18 mcg inhalation capsule Take 1 Capsule by mouth daily. rosuvastatin (CRESTOR) 10 mg tablet Take 1 Tablet by mouth every evening. LORazepam (ATIVAN) 0.5 mg tablet Take 0.5 mg by mouth two (2) times daily as needed. dilTIAZem ER (CARDIZEM CD) 240 mg capsule Take 1 Capsule by mouth nightly. zolpidem (AMBIEN) 10 mg tablet Take 10 mg by mouth nightly.    gabapentin (NEURONTIN) 300 mg capsule TAKE 1 CAPSULE BY MOUTH THREE TIMES DAILY     No current facility-administered medications for this visit.        Past Medical History:   Diagnosis Date    Chronic obstructive pulmonary disease ([address]     Chronic pain     BOTH ARMS AND NECK    Depression with anxiety     GERD (gastroesophageal reflux disease)     Hypertension     no longer on meds        Past Surgical History:   Procedure Laterality Date    COLONOSCOPY N/A 8/30/2022    COLONOSCOPY, ESOPHAGOGASTRODUODENOSCOPY (EGD) performed by Rowena Okeefe MD at [facility] ENDOSCOPY    HX CHOLECYSTECTOMY      HX COLONOSCOPY      HX ENDOSCOPY      HX ORTHOPAEDIC  1999    LOWER BACK - HERNIATED [address]    HX ORTHOPAEDIC      RIGHT WRIST    HX ROTATOR CUFF REPAIR Left     HX TONSILLECTOMY  CHILDHOOD       Family History   Problem Relation Age of Onset    Stroke Mother     Other Mother     Heart Disease Father     Hypertension Father     Elevated Lipids Father     No Known Problems Brother     Heart Disease Maternal Grandmother     Heart Disease Maternal Grandfather     Cancer Paternal Grandmother         LEUKEMIA    No Known Problems Brother     Anesth Problems Neg Hx         Social History     Tobacco Use    Smoking status: Former     Packs/day: 1.50     Years: 30.00     Pack years: 45.00     Types: Cigarettes     Quit date: 12/18/2012     Years since quitting: 10.1    Smokeless tobacco: Never   Vaping Use    Vaping Use: Never used   Substance Use Topics    Alcohol use: Yes     Comment: OCC    Drug use: Never Review of Systems   Musculoskeletal:  Positive for neck pain and neck stiffness. All other systems reviewed and are negative. Vitals:  Ht 5' 6\" (1.676 m)   Wt 170 lb (77.1 kg)   BMI 27.44 kg/m²      Body mass index is 27.44 kg/m². Physical Exam    Neurologic  Overall Assessment of Muscle Strength and Tone reveals  Upper Extremities - Right Deltoid - 5/5. Left Deltoid - 4/5. Right Bicep - 5/5. Left Bicep - 5/5. Right Tricep - 5/5. Left Tricep - 5/5. Right Wrist Extensors - 5/5. Left Wrist Extensors - 5/5. Right Wrist Flexors - 5/5. Left Wrist Flexors - 5/5. Right Intrinsics - 5/5. Left Intrinsics - 5/5. General Assessment of Reflexes  Right Hand - Stewart's sign is negative in the right hand. Left Hand - Stewart's sign is negative in the left hand. Reflexes (Dermatomes)  2/2 Normal - Left Bicep (C5-6), Left Tricep (C7-8), Left Brachioradialis (C5-6), Right Bicep (C5-6), Right Tricep (C7-8) and Right Brachioradialis (C5-6). Musculoskeletal  Global Assessment  Examination of related systems reveals - well-developed, well-nourished, in no acute distress, alert and oriented x 3 and normal coordination. Gait and Station - normal gait and station and normal posture. Cervical Spine - Evaluation of related systems reveals - no lymphadenopathy and neurovascularly intact bilaterally. No obvious deformities. Sensation to light touch is intact in all upper extremity dermatomes. Inspection and Palpation - Tenderness - mild and localized posterior cervical and upper trapezius bilaterally. ROM - Flexion - 70 *. Right Lateral Flexion - 30 °. Left Lateral Flexion - 30 °. Extension - 60 °. Right Rotation - 70 °. Left Rotation - 70 °. Cervical Spine - Functional Testing - Foraminal Compression/Spurling's Test negative. An electronic signature was used to authenticate this note.   -- Emily Nails PA-C

## 2023-02-21 NOTE — PROGRESS NOTES
1. Have you been to the ER, urgent care clinic since your last visit? Hospitalized since your last visit? No    2. Have you seen or consulted any other health care providers outside of the [address] [place] [address] since your last visit? Include any pap smears or colon screening.  No    Chief Complaint   Patient presents with    Surgical Follow-up     Sx 2/11/22 C5/6 ACDF (1 Year PO)

## 2023-03-06 ENCOUNTER — OFFICE VISIT (OUTPATIENT)
Dept: ORTHOPEDIC SURGERY | Age: 68
End: 2023-03-06
Payer: MEDICARE

## 2023-03-06 DIAGNOSIS — M77.8 SHOULDER TENDINITIS, LEFT: Primary | ICD-10-CM

## 2023-03-06 DIAGNOSIS — M75.42 IMPINGEMENT SYNDROME OF LEFT SHOULDER: ICD-10-CM

## 2023-03-06 PROCEDURE — 1090F PRES/ABSN URINE INCON ASSESS: CPT | Performed by: ORTHOPAEDIC SURGERY

## 2023-03-06 PROCEDURE — G8417 CALC BMI ABV UP PARAM F/U: HCPCS | Performed by: ORTHOPAEDIC SURGERY

## 2023-03-06 PROCEDURE — G8400 PT W/DXA NO RESULTS DOC: HCPCS | Performed by: ORTHOPAEDIC SURGERY

## 2023-03-06 PROCEDURE — G8536 NO DOC ELDER MAL SCRN: HCPCS | Performed by: ORTHOPAEDIC SURGERY

## 2023-03-06 PROCEDURE — 99213 OFFICE O/P EST LOW 20 MIN: CPT | Performed by: ORTHOPAEDIC SURGERY

## 2023-03-06 PROCEDURE — 1123F ACP DISCUSS/DSCN MKR DOCD: CPT | Performed by: ORTHOPAEDIC SURGERY

## 2023-03-06 PROCEDURE — 3017F COLORECTAL CA SCREEN DOC REV: CPT | Performed by: ORTHOPAEDIC SURGERY

## 2023-03-06 PROCEDURE — G8432 DEP SCR NOT DOC, RNG: HCPCS | Performed by: ORTHOPAEDIC SURGERY

## 2023-03-06 PROCEDURE — G8427 DOCREV CUR MEDS BY ELIG CLIN: HCPCS | Performed by: ORTHOPAEDIC SURGERY

## 2023-03-06 PROCEDURE — 1101F PT FALLS ASSESS-DOCD LE1/YR: CPT | Performed by: ORTHOPAEDIC SURGERY

## 2023-03-06 NOTE — LETTER
3/6/2023    Patient: Riri Oneal   YOB: 1955   Date of Visit: 3/6/2023     José Miguel Fried MD  [address] [address]  Via Fax: [phone]    Dear José Miguel Fried MD,      Thank you for referring Ms. Safia Diaz to [facility] for evaluation. My notes for this consultation are attached. If you have questions, please do not hesitate to call me. I look forward to following your patient along with you.       Sincerely,    Sury Wayne MD

## 2023-03-06 NOTE — PROGRESS NOTES
Tomasz Middleton (: 1955) is a 79 y.o. female, patient, here for evaluation of the following chief complaint(s):  Shoulder Pain (Left shoulder pain )       HPI:    Patient returns to the office with recurrent discomfort of the left shoulder. She is here today to discuss some of the options that we had talked about before. She still has some level of pain with raising her arm up overhead and reaching around her back. Allergies   Allergen Reactions    Amlodipine Vertigo    Ace Inhibitors Cough    Hydrocodone Rash and Itching       Current Outpatient Medications   Medication Sig    gabapentin (NEURONTIN) 300 mg capsule Take 1 Capsule by mouth three (3) times daily as needed for Pain. Max Daily Amount: 900 mg. Take 2 tablet by mouth 1 hour before bedtime. May add daytime dose or third bedtime dose if needed for nerve pain. Indications: neuropathic pain    gabapentin (NEURONTIN) 300 mg capsule TAKE 1 CAPSULE BY MOUTH THREE TIMES DAILY    famotidine (PEPCID) 20 mg tablet Take 20 mg by mouth every evening. irbesartan (AVAPRO) 150 mg tablet Take 150 mg by mouth daily. Unsure if it has HCTZ    ondansetron hcl (ZOFRAN) 4 mg tablet Take 4 mg by mouth daily as needed for Nausea or Vomiting.    linaCLOtide (LINZESS) 72 mcg cap capsule Take 72 mcg by mouth daily. fexofenadine (ALLEGRA) 180 mg tablet Take 180 mg by mouth daily. MULTIVITAMIN PO Take  by mouth. Takes one po once daily. CALCIUM-VITAMIN D3 PO Take  by mouth. Takes one po twice a day. omeprazole (PRILOSEC) 20 mg capsule Take 20 mg by mouth every morning. tiotropium (SPIRIVA) 18 mcg inhalation capsule Take 1 Capsule by mouth daily. rosuvastatin (CRESTOR) 10 mg tablet Take 1 Tablet by mouth every evening. LORazepam (ATIVAN) 0.5 mg tablet Take 0.5 mg by mouth two (2) times daily as needed. dilTIAZem ER (CARDIZEM CD) 240 mg capsule Take 1 Capsule by mouth nightly. zolpidem (AMBIEN) 10 mg tablet Take 10 mg by mouth nightly.      No current facility-administered medications for this visit.        Past Medical History:   Diagnosis Date    Chronic obstructive pulmonary disease ([address]     Chronic pain     BOTH ARMS AND NECK    Depression with anxiety     GERD (gastroesophageal reflux disease)     Hypertension     no longer on meds        Past Surgical History:   Procedure Laterality Date    COLONOSCOPY N/A 8/30/2022    COLONOSCOPY, ESOPHAGOGASTRODUODENOSCOPY (EGD) performed by Christi Kelsey MD at [facility] ENDOSCOPY    HX CHOLECYSTECTOMY      HX COLONOSCOPY      HX ENDOSCOPY      HX ORTHOPAEDIC  1999    LOWER BACK - HERNIATED [address]    HX ORTHOPAEDIC      RIGHT WRIST    HX ROTATOR CUFF REPAIR Left     HX TONSILLECTOMY  CHILDHOOD       Family History   Problem Relation Age of Onset    Stroke Mother     Other Mother     Heart Disease Father     Hypertension Father     Elevated Lipids Father     No Known Problems Brother     Heart Disease Maternal Grandmother     Heart Disease Maternal Grandfather     Cancer Paternal Grandmother         LEUKEMIA    No Known Problems Brother     Anesth Problems Neg Hx         Social History     Socioeconomic History    Marital status:      Spouse name: Not on file    Number of children: Not on file    Years of education: Not on file    Highest education level: Not on file   Occupational History    Not on file   Tobacco Use    Smoking status: Former     Packs/day: 1.50     Years: 30.00     Pack years: 45.00     Types: Cigarettes     Quit date: 12/18/2012     Years since quitting: 10.2    Smokeless tobacco: Never   Vaping Use    Vaping Use: Never used   Substance and Sexual Activity    Alcohol use: Yes     Comment: OCC    Drug use: Never    Sexual activity: Not Currently   Other Topics Concern    Not on file   Social History Narrative    Not on file     Social Determinants of Health     Financial Resource Strain: Not on file   Food Insecurity: Not on file   Transportation Needs: Not on file   Physical Activity: Not on file   Stress: Not on file   Social Connections: Not on file   Intimate Partner Violence: Not on file   Housing Stability: Not on file       Review of Systems   Musculoskeletal:         Left shoulder pain     Vitals: There were no vitals taken for this visit. There is no height or weight on file to calculate BMI. Ortho Exam     Patient is alert and oriented x3. Patient is in no acute distress. Patient ambulates with a nonantalgic gait. Left shoulder:  No shoulder girdle atrophy. There is no soft tissue swelling, ecchymosis, abrasions or lacerations. Active range of motion of the shoulder is limited to 130° of forward flexion, 120° of lateral abduction and 70° of external rotation. Internal rotation is to the SI joint and is painful. Passive range of motion is full with a painful impingement sign and painful Duarte sign. Rotator cuff strength is painful to evaluate and is at 4+/5 with forward flexion and lateral abduction. External rotational strength is maintained. There is no crepitation about the joint. Palpation of the [facility] joint does not reproduce discomfort and there is no pain elicited with cross-body adduction. Strength of the extremity is 5/5 strength at biceps/triceps/wrist extension and is comparable to the contralateral side. DTR's are intact at +2/4 and are symmetrical.  Cervical range of motion is full with no pain to palpation along the paraspinal musculature or medial border of the scapula. Spurling's sign is negative. Prior MRI was reviewed once again today. She has extreme thinning of a prior rotator cuff repair with areas of full-thickness rotator cuff involvement      ASSESSMENT/PLAN:    I have gone over the MRI findings. We discussed treatment options moving forward. She would like to continue with nonoperative treatment but does have some questions about the surgical management.   Again, I believe a revision rotator cuff repair with the atrophy of the tendon at this stage will lend a very unpredictable outcome. A more predictable outcome would be to consider reverse total shoulder replacement. I have gone over the surgery in great detail. We discussed the risks and benefits of this. She would like to maintain nonoperative treatment at this stage but will return if her symptoms do return.         Leslye Gowers, MD

## 2023-04-19 ENCOUNTER — OFFICE VISIT (OUTPATIENT)
Dept: ORTHOPEDIC SURGERY | Age: 68
End: 2023-04-19

## 2023-04-19 DIAGNOSIS — M77.8 SHOULDER TENDINITIS, LEFT: ICD-10-CM

## 2023-04-19 DIAGNOSIS — M75.42 IMPINGEMENT SYNDROME OF LEFT SHOULDER: Primary | ICD-10-CM

## 2023-04-19 RX ORDER — BUPIVACAINE HYDROCHLORIDE 2.5 MG/ML
5 INJECTION, SOLUTION INFILTRATION; PERINEURAL ONCE
Status: COMPLETED | OUTPATIENT
Start: 2023-04-19 | End: 2023-04-19

## 2023-04-19 RX ORDER — METHYLPREDNISOLONE ACETATE 80 MG/ML
80 INJECTION, SUSPENSION INTRA-ARTICULAR; INTRALESIONAL; INTRAMUSCULAR; SOFT TISSUE ONCE
Status: COMPLETED | OUTPATIENT
Start: 2023-04-19 | End: 2023-04-19

## 2023-04-19 RX ADMIN — BUPIVACAINE HYDROCHLORIDE 12.5 MG: 2.5 INJECTION, SOLUTION INFILTRATION; PERINEURAL at 17:24

## 2023-04-19 RX ADMIN — METHYLPREDNISOLONE ACETATE 80 MG: 80 INJECTION, SUSPENSION INTRA-ARTICULAR; INTRALESIONAL; INTRAMUSCULAR; SOFT TISSUE at 17:24

## 2023-04-19 NOTE — PROGRESS NOTES
Luke Vazquez (: 1955) is a 76 y.o. female, patient, here for evaluation of the following chief complaint(s):  Shoulder Pain (Left shoulder pain)       HPI:    Patient presents to the office today with recurrent discomfort of the left shoulder. Is a patient who I  evaluated before in the past.  She does have evidence of prior rotator cuff repair of the left shoulder. MRI showed thinning but no danii tears of the rotator cuff. She is here today with recurrent pain but she also has pain is more located in the scapular and the neck region. She does have significant cervical degenerative disc disease. She is here today for further advice. Allergies   Allergen Reactions    Amlodipine Vertigo    Ace Inhibitors Cough    Hydrocodone Rash and Itching       Current Outpatient Medications   Medication Sig    gabapentin (NEURONTIN) 300 mg capsule Take 1 Capsule by mouth three (3) times daily as needed for Pain. Max Daily Amount: 900 mg. Take 2 tablet by mouth 1 hour before bedtime. May add daytime dose or third bedtime dose if needed for nerve pain. Indications: neuropathic pain    gabapentin (NEURONTIN) 300 mg capsule TAKE 1 CAPSULE BY MOUTH THREE TIMES DAILY    famotidine (PEPCID) 20 mg tablet Take 20 mg by mouth every evening. irbesartan (AVAPRO) 150 mg tablet Take 150 mg by mouth daily. Unsure if it has HCTZ    ondansetron hcl (ZOFRAN) 4 mg tablet Take 4 mg by mouth daily as needed for Nausea or Vomiting.    linaCLOtide (LINZESS) 72 mcg cap capsule Take 72 mcg by mouth daily. fexofenadine (ALLEGRA) 180 mg tablet Take 180 mg by mouth daily. MULTIVITAMIN PO Take  by mouth. Takes one po once daily. CALCIUM-VITAMIN D3 PO Take  by mouth. Takes one po twice a day. omeprazole (PRILOSEC) 20 mg capsule Take 20 mg by mouth every morning. tiotropium (SPIRIVA) 18 mcg inhalation capsule Take 1 Capsule by mouth daily. rosuvastatin (CRESTOR) 10 mg tablet Take 1 Tablet by mouth every evening. LORazepam (ATIVAN) 0.5 mg tablet Take 0.5 mg by mouth two (2) times daily as needed. dilTIAZem ER (CARDIZEM CD) 240 mg capsule Take 1 Capsule by mouth nightly. zolpidem (AMBIEN) 10 mg tablet Take 10 mg by mouth nightly. No current facility-administered medications for this visit.        Past Medical History:   Diagnosis Date    Chronic obstructive pulmonary disease ([facility] [address]     Chronic pain     BOTH ARMS AND NECK    Depression with anxiety     GERD (gastroesophageal reflux disease)     Hypertension     no longer on meds        Past Surgical History:   Procedure Laterality Date    COLONOSCOPY N/A 8/30/2022    COLONOSCOPY, ESOPHAGOGASTRODUODENOSCOPY (EGD) performed by Cayetano Olivier MD at [address] [place] [address] ENDOSCOPY    HX CHOLECYSTECTOMY      HX COLONOSCOPY      HX ENDOSCOPY      HX ORTHOPAEDIC  1999    LOWER BACK - HERNIATED [address]    HX ORTHOPAEDIC      RIGHT WRIST    HX ROTATOR CUFF REPAIR Left     HX TONSILLECTOMY  CHILDHOOD       Family History   Problem Relation Age of Onset    Stroke Mother     Other Mother     Heart Disease Father     Hypertension Father     Elevated Lipids Father     No Known Problems Brother     Heart Disease Maternal Grandmother     Heart Disease Maternal Grandfather     Cancer Paternal Grandmother         LEUKEMIA    No Known Problems Brother     Anesth Problems Neg Hx         Social History     Socioeconomic History    Marital status:      Spouse name: Not on file    Number of children: Not on file    Years of education: Not on file    Highest education level: Not on file   Occupational History    Not on file   Tobacco Use    Smoking status: Former     Packs/day: 1.50     Years: 30.00     Pack years: 45.00     Types: Cigarettes     Quit date: 12/18/2012     Years since quitting: 10.3    Smokeless tobacco: Never   Vaping Use    Vaping Use: Never used   Substance and Sexual Activity    Alcohol use: Yes     Comment: OCC    Drug use: Never    Sexual activity: Not Currently   Other Topics Concern    Not on file   Social History Narrative    Not on file     Social Determinants of Health     Financial Resource Strain: Not on file   Food Insecurity: Not on file   Transportation Needs: Not on file   Physical Activity: Not on file   Stress: Not on file   Social Connections: Not on file   Intimate Partner Violence: Not on file   Housing Stability: Not on file       Review of Systems   Musculoskeletal:         Left shoulder      Vitals: There were no vitals taken for this visit. There is no height or weight on file to calculate BMI. Ortho Exam       Patient is alert and oriented x3. Patient is in no acute distress. Patient ambulates with a nonantalgic gait. Left shoulder:  No shoulder girdle atrophy. There is no soft tissue swelling, ecchymosis, abrasions or lacerations. Active range of motion of the shoulder is limited to 130° of forward flexion, 120° of lateral abduction and 70° of external rotation. Internal rotation is to the SI joint and is painful. Passive range of motion is full with a painful impingement sign and painful Duarte sign. Rotator cuff strength is painful to evaluate and is at 4+/5 with forward flexion and lateral abduction. External rotational strength is maintained. There is no crepitation about the joint. Palpation of the [facility] joint does not reproduce discomfort and there is no pain elicited with cross-body adduction. Neurovascular exam intact    Cervical spine: Patient has severe loss of range of motion in all planes of cervical spine. She has negative Spurling sign. She has normal strength with bicep, tricep and wrist extension strength. .           Prior MRI was reviewed once again today. She has extreme thinning of a prior rotator cuff repair with areas of full-thickness rotator cuff involvement    ASSESSMENT/PLAN:    We have discussed treatment options. While I do believe she has rotator cuff type pain I do not believe all of her pain is from this.   We have talked about this. I think it be reasonable to consider cortisone injection in the subacromial space. She agrees with this. If she has no improvement, I am happy to see her back. Consent for the injection was obtained. Risk of postinjection infection, lack of improvement, hypopigmentation and unusual allergic reaction were explained to the patient. After consent, the skin was sterilely prepped and 80 mg of Depo-Medrol and 5 cc of 0.25% plain Marcaine was was injected in the left shoulder. Patient had no complications. Patient is to ice modify activities for 24 hours.   Patient is to return to the office if no improvement        Haven Swift MD

## 2025-05-19 ENCOUNTER — HOSPITAL ENCOUNTER (OUTPATIENT)
Facility: HOSPITAL | Age: 70
Discharge: HOME | End: 2025-05-22
Payer: MEDICARE

## 2025-05-19 DIAGNOSIS — M54.9 BACK PAIN, UNSPECIFIED BACK LOCATION, UNSPECIFIED BACK PAIN LATERALITY, UNSPECIFIED CHRONICITY: ICD-10-CM

## 2025-05-19 DIAGNOSIS — R26.89 BALANCE PROBLEM: ICD-10-CM

## 2025-05-19 DIAGNOSIS — M54.2 NECK PAIN: ICD-10-CM

## 2025-05-19 PROCEDURE — 72148 MRI LUMBAR SPINE W/O DYE: CPT

## 2025-05-19 PROCEDURE — 72141 MRI NECK SPINE W/O DYE: CPT

## 2025-05-19 PROCEDURE — 72146 MRI CHEST SPINE W/O DYE: CPT

## (undated) DEVICE — TAPE,CLOTH/SILK,CURAD,3"X10YD,LF,40/CS: Brand: CURAD

## (undated) DEVICE — GLOVE SURG SZ 8 L12IN FNGR THK79MIL GRN LTX FREE

## (undated) DEVICE — SOLUTION IV 250ML 0.9% SOD CHL CLR INJ FLX BG CONT PRT CLSR

## (undated) DEVICE — SYR LR LCK 1ML GRAD NSAF 30ML --

## (undated) DEVICE — PREP SKN DURAPREP 26ML APPL --

## (undated) DEVICE — DEVICE SKIN CLOSURE 4 MM INCISION

## (undated) DEVICE — TOOL 14MH30 LEGEND 14CM 3MM: Brand: MIDAS REX ™

## (undated) DEVICE — TUBING HYDR IRR --

## (undated) DEVICE — HALTER TRACTION HD W/ TRI COTTON LINING FOAM LTX

## (undated) DEVICE — GLOVE SURG SZ 75 L12IN FNGR THK94MIL STD WHT LTX FREE

## (undated) DEVICE — SUTURE ABSRB L30CM 2-0 VLT SPRL PDS + STRATAFIX SXPP1B410

## (undated) DEVICE — COVER,TABLE,HEAVY DUTY,60"X90",STRL: Brand: MEDLINE

## (undated) DEVICE — BONE WAX WHITE: Brand: BONE WAX WHITE

## (undated) DEVICE — COVER,MAYO STAND,STERILE: Brand: MEDLINE

## (undated) DEVICE — ANTERIOR CERVICAL-SMH: Brand: MEDLINE INDUSTRIES, INC.

## (undated) DEVICE — SUTURE VCRL 2-0 L27IN ABSRB UD CP-2 L26MM 1/2 CIR REV CUT J869H

## (undated) DEVICE — MASTISOL ADHESIVE LIQ 2/3ML

## (undated) DEVICE — SOLUTION IRRIG 1000ML 0.9% SOD CHL USP POUR PLAS BTL

## (undated) DEVICE — FORCEPS BX L240CM JAW DIA2.8MM L CAP W/ NDL MIC MESH TOOTH

## (undated) DEVICE — TELFA NON-ADHERENT ABSORBENT DRESSING: Brand: TELFA

## (undated) DEVICE — FLOSEAL HEMOSTATIC MATRIX, 5ML: Brand: FLOSEAL HEMOSTATIC MATRIX

## (undated) DEVICE — C-ARM: Brand: UNBRANDED

## (undated) DEVICE — DRILL BIT 7080510 11 MM DRILL BIT S

## (undated) DEVICE — BIPOLAR IRRIGATOR INTEGRATED TUBING AND BIPOLAR CORD SET, DISPOSABLE